# Patient Record
Sex: MALE | Race: WHITE | NOT HISPANIC OR LATINO | Employment: OTHER | URBAN - METROPOLITAN AREA
[De-identification: names, ages, dates, MRNs, and addresses within clinical notes are randomized per-mention and may not be internally consistent; named-entity substitution may affect disease eponyms.]

---

## 2017-01-12 ENCOUNTER — HOSPITAL ENCOUNTER (OUTPATIENT)
Dept: PAIN MEDICINE | Facility: HOSPITAL | Age: 67
Discharge: HOME | End: 2017-01-12
Attending: ANESTHESIOLOGY | Admitting: ANESTHESIOLOGY

## 2017-02-10 ENCOUNTER — HOSPITAL ENCOUNTER (OUTPATIENT)
Dept: OTHER | Facility: HOSPITAL | Age: 67
Setting detail: SPECIMEN
Discharge: HOME | End: 2017-02-10
Attending: INTERNAL MEDICINE | Admitting: INTERNAL MEDICINE

## 2017-02-10 ENCOUNTER — OFFICE (AMBULATORY)
Dept: URBAN - METROPOLITAN AREA CLINIC 64 | Facility: CLINIC | Age: 67
End: 2017-02-10
Payer: COMMERCIAL

## 2017-02-10 ENCOUNTER — ON CAMPUS - OUTPATIENT (AMBULATORY)
Dept: URBAN - METROPOLITAN AREA HOSPITAL 2 | Facility: HOSPITAL | Age: 67
End: 2017-02-10
Payer: COMMERCIAL

## 2017-02-10 VITALS
RESPIRATION RATE: 20 BRPM | HEART RATE: 73 BPM | HEART RATE: 94 BPM | TEMPERATURE: 97.3 F | SYSTOLIC BLOOD PRESSURE: 133 MMHG | RESPIRATION RATE: 18 BRPM | HEART RATE: 48 BPM | OXYGEN SATURATION: 100 % | HEART RATE: 68 BPM | WEIGHT: 173 LBS | DIASTOLIC BLOOD PRESSURE: 93 MMHG | RESPIRATION RATE: 16 BRPM | DIASTOLIC BLOOD PRESSURE: 86 MMHG | SYSTOLIC BLOOD PRESSURE: 169 MMHG | DIASTOLIC BLOOD PRESSURE: 73 MMHG | DIASTOLIC BLOOD PRESSURE: 67 MMHG | HEART RATE: 66 BPM | SYSTOLIC BLOOD PRESSURE: 157 MMHG | SYSTOLIC BLOOD PRESSURE: 161 MMHG | OXYGEN SATURATION: 99 % | SYSTOLIC BLOOD PRESSURE: 166 MMHG | SYSTOLIC BLOOD PRESSURE: 142 MMHG | HEIGHT: 68 IN | DIASTOLIC BLOOD PRESSURE: 97 MMHG | OXYGEN SATURATION: 91 %

## 2017-02-10 DIAGNOSIS — D12.2 BENIGN NEOPLASM OF ASCENDING COLON: ICD-10-CM

## 2017-02-10 DIAGNOSIS — K64.8 OTHER HEMORRHOIDS: ICD-10-CM

## 2017-02-10 DIAGNOSIS — K57.30 DIVERTICULOSIS OF LARGE INTESTINE WITHOUT PERFORATION OR ABS: ICD-10-CM

## 2017-02-10 DIAGNOSIS — Z86.010 PERSONAL HISTORY OF COLONIC POLYPS: ICD-10-CM

## 2017-02-10 DIAGNOSIS — K62.89 OTHER SPECIFIED DISEASES OF ANUS AND RECTUM: ICD-10-CM

## 2017-02-10 PROCEDURE — 45385 COLONOSCOPY W/LESION REMOVAL: CPT | Mod: PT | Performed by: INTERNAL MEDICINE

## 2017-02-10 PROCEDURE — 88305 TISSUE EXAM BY PATHOLOGIST: CPT | Mod: 26 | Performed by: INTERNAL MEDICINE

## 2017-03-09 ENCOUNTER — HOSPITAL ENCOUNTER (OUTPATIENT)
Dept: PAIN MEDICINE | Facility: HOSPITAL | Age: 67
Discharge: HOME | End: 2017-03-09
Attending: ANESTHESIOLOGY | Admitting: ANESTHESIOLOGY

## 2017-03-27 ENCOUNTER — HOSPITAL ENCOUNTER (OUTPATIENT)
Dept: OTHER | Facility: HOSPITAL | Age: 67
Discharge: HOME | End: 2017-03-27
Attending: FAMILY MEDICINE | Admitting: FAMILY MEDICINE

## 2017-04-10 ENCOUNTER — HOSPITAL ENCOUNTER (OUTPATIENT)
Dept: CARDIOLOGY | Facility: HOSPITAL | Age: 67
Discharge: HOME | End: 2017-04-10
Attending: INTERNAL MEDICINE | Admitting: INTERNAL MEDICINE

## 2017-05-11 ENCOUNTER — HOSPITAL ENCOUNTER (OUTPATIENT)
Dept: PAIN MEDICINE | Facility: HOSPITAL | Age: 67
Discharge: HOME | End: 2017-05-11
Attending: ANESTHESIOLOGY | Admitting: ANESTHESIOLOGY

## 2017-06-29 ENCOUNTER — HOSPITAL ENCOUNTER (OUTPATIENT)
Dept: PAIN MEDICINE | Facility: HOSPITAL | Age: 67
Discharge: HOME | End: 2017-06-29
Attending: ANESTHESIOLOGY | Admitting: ANESTHESIOLOGY

## 2017-08-02 ENCOUNTER — HOSPITAL ENCOUNTER (OUTPATIENT)
Dept: CARDIOLOGY | Facility: HOSPITAL | Age: 67
Discharge: HOME | End: 2017-08-02
Attending: SURGERY | Admitting: SURGERY

## 2017-08-21 ENCOUNTER — HOSPITAL ENCOUNTER (OUTPATIENT)
Dept: GENERAL RADIOLOGY | Facility: HOSPITAL | Age: 67
Discharge: HOME | End: 2017-08-21
Attending: PHYSICIAN ASSISTANT | Admitting: PHYSICIAN ASSISTANT

## 2017-08-24 ENCOUNTER — HOSPITAL ENCOUNTER (OUTPATIENT)
Dept: PAIN MEDICINE | Facility: HOSPITAL | Age: 67
Discharge: HOME | End: 2017-08-24
Attending: ANESTHESIOLOGY | Admitting: ANESTHESIOLOGY

## 2017-09-22 ENCOUNTER — HOSPITAL ENCOUNTER (OUTPATIENT)
Dept: SPEECH THERAPY | Facility: HOSPITAL | Age: 67
Discharge: HOME | End: 2017-09-22
Attending: PHYSICIAN ASSISTANT | Admitting: PHYSICIAN ASSISTANT

## 2017-10-13 ENCOUNTER — HOSPITAL ENCOUNTER (OUTPATIENT)
Dept: OTHER | Facility: HOSPITAL | Age: 67
Discharge: HOME | End: 2017-10-13
Attending: PHYSICIAN ASSISTANT | Admitting: PHYSICIAN ASSISTANT

## 2017-10-19 ENCOUNTER — HOSPITAL ENCOUNTER (OUTPATIENT)
Dept: PAIN MEDICINE | Facility: HOSPITAL | Age: 67
Discharge: HOME | End: 2017-10-19
Attending: ANESTHESIOLOGY | Admitting: ANESTHESIOLOGY

## 2017-11-30 ENCOUNTER — HOSPITAL ENCOUNTER (OUTPATIENT)
Dept: PAIN MEDICINE | Facility: HOSPITAL | Age: 67
Discharge: HOME | End: 2017-11-30
Attending: ANESTHESIOLOGY | Admitting: ANESTHESIOLOGY

## 2018-02-01 ENCOUNTER — HOSPITAL ENCOUNTER (OUTPATIENT)
Dept: PAIN MEDICINE | Facility: HOSPITAL | Age: 68
Discharge: HOME | End: 2018-02-01
Attending: ANESTHESIOLOGY | Admitting: ANESTHESIOLOGY

## 2018-03-15 ENCOUNTER — HOSPITAL ENCOUNTER (OUTPATIENT)
Dept: PAIN MEDICINE | Facility: HOSPITAL | Age: 68
Discharge: HOME | End: 2018-03-15
Attending: ANESTHESIOLOGY | Admitting: ANESTHESIOLOGY

## 2018-04-10 ENCOUNTER — HOSPITAL ENCOUNTER (OUTPATIENT)
Dept: OTHER | Facility: HOSPITAL | Age: 68
Setting detail: SPECIMEN
Discharge: HOME | End: 2018-04-10
Attending: UROLOGY | Admitting: UROLOGY

## 2018-04-10 LAB — SPECIMEN SOURCE: NORMAL

## 2018-04-26 ENCOUNTER — HOSPITAL ENCOUNTER (OUTPATIENT)
Dept: PAIN MEDICINE | Facility: HOSPITAL | Age: 68
Discharge: HOME | End: 2018-04-26
Attending: ANESTHESIOLOGY | Admitting: ANESTHESIOLOGY

## 2018-06-14 ENCOUNTER — HOSPITAL ENCOUNTER (OUTPATIENT)
Dept: PAIN MEDICINE | Facility: HOSPITAL | Age: 68
Discharge: HOME | End: 2018-06-14
Attending: ANESTHESIOLOGY | Admitting: ANESTHESIOLOGY

## 2018-07-19 ENCOUNTER — HOSPITAL ENCOUNTER (OUTPATIENT)
Dept: PAIN MEDICINE | Facility: HOSPITAL | Age: 68
Discharge: HOME | End: 2018-07-19
Attending: ANESTHESIOLOGY | Admitting: ANESTHESIOLOGY

## 2018-08-28 ENCOUNTER — HOSPITAL ENCOUNTER (OUTPATIENT)
Dept: CARDIOLOGY | Facility: HOSPITAL | Age: 68
Discharge: HOME | End: 2018-08-28
Attending: SURGERY | Admitting: SURGERY

## 2018-09-06 ENCOUNTER — HOSPITAL ENCOUNTER (OUTPATIENT)
Dept: PAIN MEDICINE | Facility: HOSPITAL | Age: 68
Discharge: HOME | End: 2018-09-06
Attending: ANESTHESIOLOGY | Admitting: ANESTHESIOLOGY

## 2018-10-25 ENCOUNTER — HOSPITAL ENCOUNTER (OUTPATIENT)
Dept: PAIN MEDICINE | Facility: HOSPITAL | Age: 68
Discharge: HOME | End: 2018-10-25
Attending: ANESTHESIOLOGY | Admitting: ANESTHESIOLOGY

## 2018-12-13 ENCOUNTER — HOSPITAL ENCOUNTER (OUTPATIENT)
Dept: PAIN MEDICINE | Facility: HOSPITAL | Age: 68
Discharge: HOME | End: 2018-12-13
Attending: ANESTHESIOLOGY | Admitting: ANESTHESIOLOGY

## 2019-01-31 ENCOUNTER — HOSPITAL ENCOUNTER (OUTPATIENT)
Dept: PAIN MEDICINE | Facility: HOSPITAL | Age: 69
Discharge: HOME | End: 2019-01-31
Attending: ANESTHESIOLOGY | Admitting: ANESTHESIOLOGY

## 2019-03-21 ENCOUNTER — HOSPITAL ENCOUNTER (OUTPATIENT)
Dept: PAIN MEDICINE | Facility: HOSPITAL | Age: 69
Discharge: HOME | End: 2019-03-21
Attending: ANESTHESIOLOGY | Admitting: ANESTHESIOLOGY

## 2019-04-23 ENCOUNTER — CONVERSION ENCOUNTER (OUTPATIENT)
Dept: PAIN MEDICINE | Facility: CLINIC | Age: 69
End: 2019-04-23

## 2019-04-23 ENCOUNTER — HOSPITAL ENCOUNTER (OUTPATIENT)
Dept: PAIN MEDICINE | Facility: HOSPITAL | Age: 69
Discharge: HOME | End: 2019-04-23
Attending: ANESTHESIOLOGY | Admitting: ANESTHESIOLOGY

## 2019-05-24 ENCOUNTER — HOSPITAL ENCOUNTER (OUTPATIENT)
Dept: PAIN MEDICINE | Facility: HOSPITAL | Age: 69
Discharge: HOME | End: 2019-05-24
Attending: ANESTHESIOLOGY | Admitting: ANESTHESIOLOGY

## 2019-05-24 ENCOUNTER — CONVERSION ENCOUNTER (OUTPATIENT)
Dept: PAIN MEDICINE | Facility: CLINIC | Age: 69
End: 2019-05-24

## 2019-05-24 LAB
AMPHETAMINES UR QL SCN: NEGATIVE
BARBITURATES UR QL SCN: NEGATIVE
BENZODIAZ UR QL SCN: NEGATIVE
BZE UR QL SCN: NEGATIVE
CREAT 24H UR-MCNC: 80.6 MG/DL
METHADONE UR QL SCN: NEGATIVE
OPIATE CONFIRMATION URINE: ABNORMAL
OPIATES TESTED UR SCN: ABNORMAL
PCP UR QL: NEGATIVE
THC SERPLBLD CFM-MCNC: NEGATIVE NG/ML

## 2019-06-03 VITALS
DIASTOLIC BLOOD PRESSURE: 77 MMHG | SYSTOLIC BLOOD PRESSURE: 132 MMHG | HEIGHT: 68 IN | RESPIRATION RATE: 16 BRPM | OXYGEN SATURATION: 98 % | BODY MASS INDEX: 26.52 KG/M2 | HEART RATE: 66 BPM | WEIGHT: 175 LBS

## 2019-06-04 VITALS
WEIGHT: 178 LBS | RESPIRATION RATE: 16 BRPM | OXYGEN SATURATION: 98 % | BODY MASS INDEX: 26.98 KG/M2 | SYSTOLIC BLOOD PRESSURE: 171 MMHG | DIASTOLIC BLOOD PRESSURE: 99 MMHG | HEART RATE: 74 BPM | HEIGHT: 68 IN
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
## 2019-06-12 ENCOUNTER — HOSPITAL ENCOUNTER (OUTPATIENT)
Dept: LAB | Facility: HOSPITAL | Age: 69
Discharge: HOME | End: 2019-06-12
Attending: ORTHOPAEDIC SURGERY | Admitting: ORTHOPAEDIC SURGERY

## 2019-06-12 LAB
CRP SERPL-MCNC: 0.09 MG/DL (ref 0–0.7)
ERYTHROCYTE [SEDIMENTATION RATE] IN BLOOD BY WESTERGREN METHOD: 14 MM/HR (ref 0–20)

## 2019-06-21 RX ORDER — BACLOFEN 10 MG/1
TABLET ORAL
Qty: 90 TABLET | Refills: 1 | Status: SHIPPED | OUTPATIENT
Start: 2019-06-21 | End: 2019-07-17 | Stop reason: SDUPTHER

## 2019-06-24 RX ORDER — LUBIPROSTONE 24 UG/1
CAPSULE, GELATIN COATED ORAL
Qty: 60 CAPSULE | Refills: 1 | OUTPATIENT
Start: 2019-06-24

## 2019-06-24 RX ORDER — GABAPENTIN ENACARBIL 600 MG/1
TABLET, EXTENDED RELEASE ORAL
Qty: 90 TABLET | Refills: 1 | OUTPATIENT
Start: 2019-06-24

## 2019-06-25 RX ORDER — LUBIPROSTONE 24 UG/1
24 CAPSULE, GELATIN COATED ORAL 2 TIMES DAILY
Refills: 1 | COMMUNITY
Start: 2019-05-21 | End: 2019-06-25 | Stop reason: SDUPTHER

## 2019-06-25 RX ORDER — LUBIPROSTONE 24 UG/1
24 CAPSULE, GELATIN COATED ORAL 2 TIMES DAILY
Qty: 60 CAPSULE | Refills: 1 | Status: SHIPPED | OUTPATIENT
Start: 2019-06-25 | End: 2019-08-26 | Stop reason: SDUPTHER

## 2019-06-26 ENCOUNTER — TELEPHONE (OUTPATIENT)
Dept: PAIN MEDICINE | Facility: HOSPITAL | Age: 69
End: 2019-06-26

## 2019-06-29 RX ORDER — GLIPIZIDE 5 MG/1
TABLET ORAL
Qty: 30 TABLET | Refills: 1 | Status: SHIPPED | OUTPATIENT
Start: 2019-06-29 | End: 2019-08-26 | Stop reason: SDUPTHER

## 2019-07-17 ENCOUNTER — APPOINTMENT (OUTPATIENT)
Dept: PAIN MEDICINE | Facility: CLINIC | Age: 69
End: 2019-07-17

## 2019-07-17 ENCOUNTER — OFFICE VISIT (OUTPATIENT)
Dept: PAIN MEDICINE | Facility: CLINIC | Age: 69
End: 2019-07-17

## 2019-07-17 VITALS
RESPIRATION RATE: 16 BRPM | DIASTOLIC BLOOD PRESSURE: 79 MMHG | SYSTOLIC BLOOD PRESSURE: 160 MMHG | HEIGHT: 68 IN | HEART RATE: 55 BPM | TEMPERATURE: 98 F | BODY MASS INDEX: 27.28 KG/M2 | WEIGHT: 180 LBS

## 2019-07-17 DIAGNOSIS — M25.50 POLYARTHRALGIA: ICD-10-CM

## 2019-07-17 DIAGNOSIS — G89.4 CHRONIC PAIN SYNDROME: Primary | ICD-10-CM

## 2019-07-17 DIAGNOSIS — M79.18 MYOFASCIAL PAIN SYNDROME: ICD-10-CM

## 2019-07-17 DIAGNOSIS — M47.812 CERVICAL SPONDYLOSIS WITHOUT MYELOPATHY: ICD-10-CM

## 2019-07-17 DIAGNOSIS — Z79.899 OTHER LONG TERM (CURRENT) DRUG THERAPY: ICD-10-CM

## 2019-07-17 DIAGNOSIS — M79.2 NEUROPATHIC PAIN: ICD-10-CM

## 2019-07-17 PROCEDURE — G0463 HOSPITAL OUTPT CLINIC VISIT: HCPCS | Performed by: ANESTHESIOLOGY

## 2019-07-17 PROCEDURE — 99214 OFFICE O/P EST MOD 30 MIN: CPT | Performed by: ANESTHESIOLOGY

## 2019-07-17 RX ORDER — AMLODIPINE BESYLATE 5 MG/1
TABLET ORAL EVERY 12 HOURS
COMMUNITY
Start: 2018-01-19 | End: 2020-01-10

## 2019-07-17 RX ORDER — MORPHINE SULFATE AND NALTREXONE HYDROCHLORIDE 20; .8 MG/1; MG/1
1 CAPSULE, EXTENDED RELEASE ORAL 2 TIMES DAILY
Qty: 60 CAPSULE | Refills: 0 | Status: SHIPPED | OUTPATIENT
Start: 2019-07-17 | End: 2019-07-17 | Stop reason: SDUPTHER

## 2019-07-17 RX ORDER — MORPHINE SULFATE AND NALTREXONE HYDROCHLORIDE 20; .8 MG/1; MG/1
1 CAPSULE, EXTENDED RELEASE ORAL 2 TIMES DAILY
Qty: 60 CAPSULE | Refills: 0 | Status: SHIPPED | OUTPATIENT
Start: 2019-07-17 | End: 2019-09-20 | Stop reason: SDUPTHER

## 2019-07-17 RX ORDER — MORPHINE SULFATE AND NALTREXONE HYDROCHLORIDE 20; .8 MG/1; MG/1
1 CAPSULE, EXTENDED RELEASE ORAL 2 TIMES DAILY
Refills: 0 | COMMUNITY
Start: 2019-06-24 | End: 2019-07-17 | Stop reason: SDUPTHER

## 2019-07-17 RX ORDER — ATENOLOL 50 MG/1
TABLET ORAL EVERY 12 HOURS
COMMUNITY
Start: 2017-11-27 | End: 2019-08-01 | Stop reason: SDUPTHER

## 2019-07-17 RX ORDER — ATORVASTATIN CALCIUM 40 MG/1
TABLET, FILM COATED ORAL
COMMUNITY
Start: 2018-05-24 | End: 2019-08-01 | Stop reason: SDUPTHER

## 2019-07-17 RX ORDER — BACLOFEN 10 MG/1
10 TABLET ORAL 3 TIMES DAILY
Qty: 90 TABLET | Refills: 5 | Status: SHIPPED | OUTPATIENT
Start: 2019-07-17 | End: 2020-02-11

## 2019-07-17 RX ORDER — PENTOXIFYLLINE 400 MG/1
TABLET, EXTENDED RELEASE ORAL EVERY 8 HOURS
COMMUNITY
Start: 2019-02-27 | End: 2019-08-01 | Stop reason: SDUPTHER

## 2019-07-17 RX ORDER — CYANOCOBALAMIN 1000 UG/ML
INJECTION, SOLUTION INTRAMUSCULAR; SUBCUTANEOUS
COMMUNITY
Start: 2018-05-24 | End: 2019-08-01 | Stop reason: SDUPTHER
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
## 2019-08-01 RX ORDER — ATORVASTATIN CALCIUM 40 MG/1
TABLET, FILM COATED ORAL
Qty: 30 TABLET | Refills: 6 | Status: SHIPPED | OUTPATIENT
Start: 2019-08-01 | End: 2020-02-08

## 2019-08-01 RX ORDER — CYANOCOBALAMIN 1000 UG/ML
INJECTION, SOLUTION INTRAMUSCULAR; SUBCUTANEOUS
Qty: 1 ML | Refills: 1 | Status: SHIPPED | OUTPATIENT
Start: 2019-08-01 | End: 2019-09-26 | Stop reason: SDUPTHER

## 2019-08-01 RX ORDER — PENTOXIFYLLINE 400 MG/1
TABLET, EXTENDED RELEASE ORAL
Qty: 90 TABLET | Refills: 4 | Status: SHIPPED | OUTPATIENT
Start: 2019-08-01 | End: 2019-12-11 | Stop reason: SDUPTHER

## 2019-08-01 RX ORDER — ATENOLOL 50 MG/1
TABLET ORAL
Qty: 30 TABLET | Refills: 1 | Status: SHIPPED | OUTPATIENT
Start: 2019-08-01 | End: 2019-09-26 | Stop reason: SDUPTHER

## 2019-08-21 PROBLEM — M25.571 ARTHRALGIA OF RIGHT ANKLE: Status: ACTIVE | Noted: 2019-08-21

## 2019-08-21 PROBLEM — I10 HYPERTENSION: Status: ACTIVE | Noted: 2019-08-21

## 2019-08-21 PROBLEM — G89.29 CHRONIC PAIN: Status: ACTIVE | Noted: 2019-04-23

## 2019-08-21 PROBLEM — G45.3 AMAUROSIS FUGAX: Status: ACTIVE | Noted: 2017-03-27

## 2019-08-21 PROBLEM — M47.812 OSTEOARTHRITIS OF CERVICAL SPINE WITHOUT MYELOPATHY: Status: ACTIVE | Noted: 2019-04-23

## 2019-08-21 PROBLEM — E11.65 TYPE 2 DIABETES MELLITUS WITH HYPERGLYCEMIA: Status: ACTIVE | Noted: 2018-03-29

## 2019-08-21 PROBLEM — M54.12 CERVICAL RADICULITIS: Status: ACTIVE | Noted: 2019-04-23

## 2019-08-21 PROBLEM — N13.9 OBSTRUCTIVE UROPATHY: Status: ACTIVE | Noted: 2018-04-05

## 2019-08-21 PROBLEM — E78.5 HYPERLIPIDEMIA: Status: ACTIVE | Noted: 2019-08-21

## 2019-08-21 PROBLEM — M25.519 SHOULDER PAIN: Status: ACTIVE | Noted: 2019-08-21

## 2019-08-21 PROBLEM — Z83.3 FAMILY HISTORY OF DIABETES MELLITUS: Status: ACTIVE | Noted: 2019-08-21

## 2019-08-21 PROBLEM — I69.328 SPEECH OR LANGUAGE DEFICIT FOLLOWING CEREBROVASCULAR ACCIDENT: Status: ACTIVE | Noted: 2017-08-14

## 2019-08-21 PROBLEM — Z87.828 HISTORY OF HEAD INJURY: Status: ACTIVE | Noted: 2017-01-26

## 2019-08-21 PROBLEM — G47.9 SLEEP DISORDER: Status: ACTIVE | Noted: 2017-04-06

## 2019-08-21 PROBLEM — I25.10 CHRONIC CORONARY ARTERY DISEASE: Status: ACTIVE | Noted: 2017-03-27

## 2019-08-21 PROBLEM — I65.23 BILATERAL CAROTID ARTERY STENOSIS: Status: ACTIVE | Noted: 2017-03-27

## 2019-08-21 PROBLEM — H53.139 SUDDEN VISUAL LOSS: Status: ACTIVE | Noted: 2017-03-24

## 2019-08-21 PROBLEM — D12.6 ADENOMATOUS POLYP OF COLON: Status: ACTIVE | Noted: 2019-02-23

## 2019-08-21 PROBLEM — B36.9 FUNGAL DERMATITIS: Status: ACTIVE | Noted: 2018-02-06

## 2019-08-21 PROBLEM — M54.2 NECK PAIN: Status: ACTIVE | Noted: 2019-08-21

## 2019-08-21 PROBLEM — Z82.3 FAMILY HISTORY OF STROKE: Status: ACTIVE | Noted: 2019-08-21

## 2019-08-23 RX ORDER — LUBIPROSTONE 24 UG/1
CAPSULE, GELATIN COATED ORAL
Qty: 60 CAPSULE | Refills: 1 | OUTPATIENT
Start: 2019-08-23

## 2019-08-25 RX ORDER — GLIPIZIDE 5 MG/1
TABLET ORAL
Qty: 30 TABLET | Refills: 1 | OUTPATIENT
Start: 2019-08-25

## 2019-08-26 ENCOUNTER — OFFICE VISIT (OUTPATIENT)
Dept: FAMILY MEDICINE CLINIC | Facility: CLINIC | Age: 69
End: 2019-08-26

## 2019-08-26 VITALS
RESPIRATION RATE: 18 BRPM | OXYGEN SATURATION: 97 % | SYSTOLIC BLOOD PRESSURE: 138 MMHG | HEIGHT: 68 IN | HEART RATE: 65 BPM | BODY MASS INDEX: 29.13 KG/M2 | DIASTOLIC BLOOD PRESSURE: 75 MMHG | WEIGHT: 192.2 LBS | TEMPERATURE: 98.8 F

## 2019-08-26 DIAGNOSIS — E78.01 FAMILIAL HYPERCHOLESTEROLEMIA: ICD-10-CM

## 2019-08-26 DIAGNOSIS — E11.65 TYPE 2 DIABETES MELLITUS WITH HYPERGLYCEMIA, UNSPECIFIED WHETHER LONG TERM INSULIN USE (HCC): Primary | ICD-10-CM

## 2019-08-26 DIAGNOSIS — I10 ESSENTIAL HYPERTENSION: ICD-10-CM

## 2019-08-26 DIAGNOSIS — I25.10 CHRONIC CORONARY ARTERY DISEASE: ICD-10-CM

## 2019-08-26 LAB
BILIRUB BLD-MCNC: NEGATIVE MG/DL
CLARITY, POC: CLEAR
COLOR UR: YELLOW
GLUCOSE UR STRIP-MCNC: NEGATIVE MG/DL
HBA1C MFR BLD: 6.4 %
KETONES UR QL: NEGATIVE
LEUKOCYTE EST, POC: NEGATIVE
NITRITE UR-MCNC: NEGATIVE MG/ML
PH UR: 5.5 [PH] (ref 5–8)
PROT UR STRIP-MCNC: ABNORMAL MG/DL
RBC # UR STRIP: ABNORMAL /UL
SP GR UR: 1.02 (ref 1–1.03)
UROBILINOGEN UR QL: NORMAL

## 2019-08-26 PROCEDURE — 81003 URINALYSIS AUTO W/O SCOPE: CPT | Performed by: FAMILY MEDICINE

## 2019-08-26 PROCEDURE — 99214 OFFICE O/P EST MOD 30 MIN: CPT | Performed by: FAMILY MEDICINE

## 2019-08-26 PROCEDURE — 83036 HEMOGLOBIN GLYCOSYLATED A1C: CPT | Performed by: FAMILY MEDICINE

## 2019-08-26 RX ORDER — GLIPIZIDE 5 MG/1
TABLET ORAL
Qty: 30 TABLET | Refills: 1 | Status: SHIPPED | OUTPATIENT
Start: 2019-08-26 | End: 2019-10-08 | Stop reason: SDUPTHER

## 2019-08-26 RX ORDER — ASPIRIN 81 MG/1
81 TABLET ORAL DAILY
COMMUNITY

## 2019-08-26 RX ORDER — LUBIPROSTONE 24 UG/1
24 CAPSULE, GELATIN COATED ORAL 2 TIMES DAILY
Qty: 60 CAPSULE | Refills: 1 | Status: SHIPPED | OUTPATIENT
Start: 2019-08-26 | End: 2019-09-20 | Stop reason: SDUPTHER

## 2019-08-26 NOTE — PROGRESS NOTES
Patient presents for follow-up on stable chronic medical problems including diabetes, hypertension, hyperlipidemia and coronary artery disease. Patient denies adverse effects of medications, chest pain, shortness of breath and abdominal pain. Patient complains of dizziness and fatigue.         Past Medical History:   Diagnosis Date   • Adenomatous polyps    • Amaurosis fugax, right eye    • Arm pain    • BPH (benign prostatic hyperplasia)    • CAD (coronary artery disease)    • CKD (chronic kidney disease) stage 3, GFR 30-59 ml/min (CMS/[facility])    • CVA, old, speech/language deficit    • Diabetes (CMS/[facility])    • Diabetic acetonemia (CMS/[facility])    • Fractures    • Hearing loss    • Hyperlipidemia    • Hypertension    • Joint pain    • Kidney stones    • Low back pain    • Neuropathic pain    • Obstructive uropathy    • Shoulder pain    • Sleep disorder    • Spondylosis, cervical      Past Surgical History:   Procedure Laterality Date   • ANKLE SURGERY      replacement  rt   • CAROTID ARTERY ANGIOPLASTY     • CARPAL TUNNEL RELEASE     • COLONOSCOPY     • CYSTOSCOPY URETEROSCOPY LASER LITHOTRIPSY      kidney stones   • EYE SURGERY      mesh  and plate under rt eye due to orbital fx   • NOSE SURGERY      x2   • TEETH EXTRACTION      dentures     Family History   Problem Relation Age of Onset   • Dementia Mother    • Heart disease Mother    • COPD Father    • Stroke Father    • Heart disease Father    • Hypertension Sister    • Diabetes Sister    • Hypertension Brother      Social History     Tobacco Use   • Smoking status: Never Smoker   • Smokeless tobacco: Never Used   Substance Use Topics   • Alcohol use: No     Frequency: Never     PHQ-2 Depression Screening  Little interest or pleasure in doing things?     Feeling down, depressed, or hopeless?     PHQ-2 Total Score       PHQ-9 Depression Screening  Little interest or pleasure in doing things?     Feeling down, depressed, or hopeless?     Trouble falling or staying  "asleep, or sleeping too much?     Feeling tired or having little energy?     Poor appetite or overeating?     Feeling bad about yourself - or that you are a failure or have let yourself or your family down?     Trouble concentrating on things, such as reading the newspaper or watching television?     Moving or speaking so slowly that other people could have noticed? Or the opposite - being so fidgety or restless that you have been moving around a lot more than usual?     Thoughts that you would be better off dead, or of hurting yourself in some way?     PHQ-9 Total Score     If you checked off any problems, how difficult have these problems made it for you to do your work, take care of things at home, or get along with other people?         Review of Systems   Constitutional: Negative for chills and fatigue.   Respiratory: Negative for cough and shortness of breath.    Cardiovascular: Negative for chest pain and palpitations.   Musculoskeletal: Positive for arthralgias, back pain and myalgias.   Skin: Negative for rash.   Neurological: Negative for dizziness and headache.     Vitals:    08/26/19 1120 08/26/19 1140   BP: 165/84 138/75   BP Location: Left arm    Cuff Size: Adult    Pulse: 65    Resp: 18    Temp: 98.8 °F (37.1 °C)    TempSrc: Oral    SpO2: 97%    Weight: 87.2 kg (192 lb 3.2 oz)    Height: 172.7 cm (68\")      Body mass index is 29.22 kg/m².  Physical Exam   Constitutional: He is oriented to person, place, and time. He appears well-developed and well-nourished. No distress.   Cardiovascular: Normal rate, regular rhythm and normal heart sounds.   No murmur heard.  Pulmonary/Chest: Effort normal and breath sounds normal. He has no wheezes. He has no rales.   Abdominal: Soft. Bowel sounds are normal. He exhibits no distension.   Musculoskeletal: Normal range of motion.      During the foot exam he had a monofilament test performed.    Neurological Sensory Findings - Unaltered hot/cold right ankle/foot " discrimination and unaltered hot/cold left ankle/foot discrimination. Unaltered sharp/dull right ankle/foot discrimination and unaltered sharp/dull left ankle/foot discrimination. No right ankle/foot altered proprioception and no left ankle/foot altered proprioception  Vascular Status -  His right foot exhibits normal foot vasculature  and no edema. His left foot exhibits normal foot vasculature  and no edema.  Skin Integrity  -  His right foot skin is intact.His left foot skin is intact..   Foot Structure and Biomechanics -  His right foot has no hallux valgus, no pes cavus, no claw toes, no hammer toes and no cavovarus present. His left foot has no hallux valgus, no pes cavus, no claw toes, no hammer toes and no cavovarus.  Neurological: He is alert and oriented to person, place, and time.   Skin: Skin is warm and dry.   Psychiatric: He has a normal mood and affect. His behavior is normal.     Office Visit on 08/26/2019   Component Date Value Ref Range Status   • Hemoglobin A1C 08/26/2019 6.4  % Final   • Color 08/26/2019 Yellow  Yellow, Straw, Dark Yellow, [place] Final   • Clarity, UA 08/26/2019 Clear  Clear Final   • Specific Gravity  08/26/2019 1.025  1.005 - 1.030 Final   • pH, Urine 08/26/2019 5.5  5.0 - 8.0 Final   • Leukocytes 08/26/2019 Negative  Negative Final   • Nitrite, UA 08/26/2019 Negative  Negative Final   • Protein, POC 08/26/2019 30 mg/dL* Negative mg/dL Final   • Glucose, UA 08/26/2019 Negative  Negative, 1000 mg/dL (3+) mg/dL Final   • Ketones, UA 08/26/2019 Negative  Negative Final   • Urobilinogen, UA 08/26/2019 Normal  Normal Final   • Bilirubin 08/26/2019 Negative  Negative Final   • Blood, UA 08/26/2019 Trace* Negative Final         Diagnoses and all orders for this visit:    1. Type 2 diabetes mellitus with hyperglycemia, unspecified whether long term insulin use (CMS/[facility]) (Primary)  Assessment & Plan:  Doing well. Symptoms associated with diabetes were reviewed and patient has had none.  Review of complications of diabetes reveals peripheral neuropathy. Encouraged routine blood sugar monitoring. Discussed need for aspirin, statin, and ACE-inhibitor/ARB therapy. Recommended daily foot exams, quaterly Hemoglobin A1c evaluations, yearly flu vaccine, yearly dilated fundoscopic exams, and dietary modifications.     Orders:  -     POC Glycosylated Hemoglobin (Hb A1C)  -     Microalbumin / Creatinine Urine Ratio - Urine, Clean Catch; Future  -     POC Urinalysis Dipstick, Automated  -     Microalbumin / Creatinine Urine Ratio - Urine, Clean Catch    2. Chronic coronary artery disease  Assessment & Plan:  No angina or decreased exercise tolerance. Tolerating medications without adverse effects. Continue medications and lifestyle modificaitons.        3. Essential hypertension  Assessment & Plan:  Report any headache, dizziness, chest pain, syncope, or other concerns.       4. Familial hypercholesterolemia  Assessment & Plan:  Encouraged low-fat, low-cholesterol diet. Discussed timing of lipid monitoring, need for liver monitoring while on meds. Risks of lack of control discussed.

## 2019-08-27 LAB
ALBUMIN/CREAT UR: 72.4 MG/G CREAT (ref 0–30)
CREAT UR-MCNC: 95.5 MG/DL
MICROALBUMIN UR-MCNC: 69.1 UG/ML

## 2019-09-20 ENCOUNTER — OFFICE VISIT (OUTPATIENT)
Dept: PAIN MEDICINE | Facility: CLINIC | Age: 69
End: 2019-09-20

## 2019-09-20 VITALS
HEIGHT: 67 IN | SYSTOLIC BLOOD PRESSURE: 151 MMHG | BODY MASS INDEX: 27.47 KG/M2 | RESPIRATION RATE: 16 BRPM | TEMPERATURE: 98.1 F | OXYGEN SATURATION: 98 % | DIASTOLIC BLOOD PRESSURE: 84 MMHG | HEART RATE: 56 BPM | WEIGHT: 175 LBS

## 2019-09-20 DIAGNOSIS — Z79.899 HIGH RISK MEDICATION USE: ICD-10-CM

## 2019-09-20 DIAGNOSIS — G89.4 CHRONIC PAIN SYNDROME: Primary | ICD-10-CM

## 2019-09-20 DIAGNOSIS — M79.2 NEUROPATHIC PAIN: ICD-10-CM

## 2019-09-20 DIAGNOSIS — M47.812 CERVICAL SPONDYLOSIS WITHOUT MYELOPATHY: ICD-10-CM

## 2019-09-20 DIAGNOSIS — M79.18 MYOFASCIAL PAIN SYNDROME: ICD-10-CM

## 2019-09-20 DIAGNOSIS — F19.90 CURRENT DRUG USE: Primary | ICD-10-CM

## 2019-09-20 DIAGNOSIS — M25.50 POLYARTHRALGIA: ICD-10-CM

## 2019-09-20 PROCEDURE — G0463 HOSPITAL OUTPT CLINIC VISIT: HCPCS | Performed by: ANESTHESIOLOGY

## 2019-09-20 PROCEDURE — 99214 OFFICE O/P EST MOD 30 MIN: CPT | Performed by: ANESTHESIOLOGY

## 2019-09-20 RX ORDER — MORPHINE SULFATE AND NALTREXONE HYDROCHLORIDE 20; .8 MG/1; MG/1
1 CAPSULE, EXTENDED RELEASE ORAL 2 TIMES DAILY
Qty: 60 CAPSULE | Refills: 0 | Status: SHIPPED | OUTPATIENT
Start: 2019-09-20 | End: 2019-09-20 | Stop reason: SDUPTHER

## 2019-09-20 RX ORDER — MORPHINE SULFATE AND NALTREXONE HYDROCHLORIDE 20; .8 MG/1; MG/1
1 CAPSULE, EXTENDED RELEASE ORAL 2 TIMES DAILY
Qty: 60 CAPSULE | Refills: 0 | Status: SHIPPED | OUTPATIENT
Start: 2019-09-20 | End: 2019-11-15

## 2019-09-20 RX ORDER — LUBIPROSTONE 24 UG/1
24 CAPSULE, GELATIN COATED ORAL 2 TIMES DAILY
Qty: 60 CAPSULE | Refills: 11 | Status: SHIPPED | OUTPATIENT
Start: 2019-09-20 | End: 2020-09-22 | Stop reason: SDUPTHER
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
## 2019-09-26 RX ORDER — CYANOCOBALAMIN 1000 UG/ML
INJECTION, SOLUTION INTRAMUSCULAR; SUBCUTANEOUS
Qty: 1 ML | Refills: 1 | Status: SHIPPED | OUTPATIENT
Start: 2019-09-26 | End: 2019-11-02 | Stop reason: SDUPTHER

## 2019-09-26 RX ORDER — ATENOLOL 50 MG/1
TABLET ORAL
Qty: 30 TABLET | Refills: 1 | Status: SHIPPED | OUTPATIENT
Start: 2019-09-26 | End: 2019-11-02 | Stop reason: SDUPTHER

## 2019-09-27 ENCOUNTER — APPOINTMENT (OUTPATIENT)
Dept: CT IMAGING | Facility: HOSPITAL | Age: 69
End: 2019-09-27

## 2019-09-27 ENCOUNTER — HOSPITAL ENCOUNTER (EMERGENCY)
Facility: HOSPITAL | Age: 69
Discharge: HOME | End: 2019-09-27
Admitting: EMERGENCY MEDICINE

## 2019-09-27 VITALS
SYSTOLIC BLOOD PRESSURE: 128 MMHG | BODY MASS INDEX: 27.6 KG/M2 | HEART RATE: 63 BPM | DIASTOLIC BLOOD PRESSURE: 73 MMHG | OXYGEN SATURATION: 98 % | HEIGHT: 68 IN | TEMPERATURE: 98.4 F | WEIGHT: 182.1 LBS | RESPIRATION RATE: 17 BRPM

## 2019-09-27 DIAGNOSIS — R10.9 FLANK PAIN: Primary | ICD-10-CM

## 2019-09-27 DIAGNOSIS — R31.9 HEMATURIA, UNSPECIFIED TYPE: ICD-10-CM

## 2019-09-27 DIAGNOSIS — R11.0 NAUSEA: ICD-10-CM

## 2019-09-27 LAB
ALBUMIN SERPL-MCNC: 3.6 G/DL (ref 3.5–4.8)
ALBUMIN/GLOB SERPL: 0.9 G/DL (ref 1–1.7)
ALP SERPL-CCNC: 94 U/L (ref 32–91)
ALT SERPL W P-5'-P-CCNC: 20 U/L (ref 17–63)
ANION GAP SERPL CALCULATED.3IONS-SCNC: 15.5 MMOL/L (ref 5–15)
AST SERPL-CCNC: 22 U/L (ref 15–41)
BACTERIA UR QL AUTO: ABNORMAL /HPF
BASOPHILS # BLD AUTO: 0 10*3/MM3 (ref 0–0.2)
BASOPHILS NFR BLD AUTO: 0.2 % (ref 0–1.5)
BILIRUB SERPL-MCNC: 1 MG/DL (ref 0.3–1.2)
BILIRUB UR QL STRIP: NEGATIVE
BUN BLD-MCNC: 22 MG/DL (ref 8–20)
BUN/CREAT SERPL: 22 (ref 6.2–20.3)
CALCIUM SPEC-SCNC: 8.7 MG/DL (ref 8.9–10.3)
CHLORIDE SERPL-SCNC: 101 MMOL/L (ref 101–111)
CLARITY UR: CLEAR
CO2 SERPL-SCNC: 28 MMOL/L (ref 22–32)
COD CRY URNS QL: ABNORMAL /HPF
COLOR UR: YELLOW
CREAT BLD-MCNC: 1 MG/DL (ref 0.7–1.2)
DEPRECATED RDW RBC AUTO: 40.3 FL (ref 37–54)
EOSINOPHIL # BLD AUTO: 0.1 10*3/MM3 (ref 0–0.4)
EOSINOPHIL NFR BLD AUTO: 1.8 % (ref 0.3–6.2)
ERYTHROCYTE [DISTWIDTH] IN BLOOD BY AUTOMATED COUNT: 13.5 % (ref 12.3–15.4)
GFR SERPL CREATININE-BSD FRML MDRD: 74 ML/MIN/1.73
GLOBULIN UR ELPH-MCNC: 3.9 GM/DL (ref 2.5–3.8)
GLUCOSE BLD-MCNC: 164 MG/DL (ref 65–99)
GLUCOSE UR STRIP-MCNC: NEGATIVE MG/DL
HCT VFR BLD AUTO: 42.2 % (ref 37.5–51)
HGB BLD-MCNC: 14.4 G/DL (ref 13–17.7)
HGB UR QL STRIP.AUTO: ABNORMAL
HYALINE CASTS UR QL AUTO: ABNORMAL /LPF
KETONES UR QL STRIP: NEGATIVE
LEUKOCYTE ESTERASE UR QL STRIP.AUTO: NEGATIVE
LIPASE SERPL-CCNC: 24 U/L (ref 22–51)
LYMPHOCYTES # BLD AUTO: 1.3 10*3/MM3 (ref 0.7–3.1)
LYMPHOCYTES NFR BLD AUTO: 24.5 % (ref 19.6–45.3)
MCH RBC QN AUTO: 29.1 PG (ref 26.6–33)
MCHC RBC AUTO-ENTMCNC: 34.2 G/DL (ref 31.5–35.7)
MCV RBC AUTO: 85.1 FL (ref 79–97)
MONOCYTES # BLD AUTO: 0.7 10*3/MM3 (ref 0.1–0.9)
MONOCYTES NFR BLD AUTO: 13.7 % (ref 5–12)
MUCOUS THREADS URNS QL MICRO: ABNORMAL /HPF
NEUTROPHILS # BLD AUTO: 3.2 10*3/MM3 (ref 1.7–7)
NEUTROPHILS NFR BLD AUTO: 59.8 % (ref 42.7–76)
NITRITE UR QL STRIP: NEGATIVE
NRBC BLD AUTO-RTO: 0.2 /100 WBC (ref 0–0.2)
PH UR STRIP.AUTO: 6 [PH] (ref 5–8)
PLATELET # BLD AUTO: 187 10*3/MM3 (ref 140–450)
PMV BLD AUTO: 8.6 FL (ref 6–12)
POTASSIUM BLD-SCNC: 3.5 MMOL/L (ref 3.6–5.1)
PROT SERPL-MCNC: 7.5 G/DL (ref 6.1–7.9)
PROT UR QL STRIP: ABNORMAL
RBC # BLD AUTO: 4.96 10*6/MM3 (ref 4.14–5.8)
RBC # UR: ABNORMAL /HPF
REF LAB TEST METHOD: ABNORMAL
SODIUM BLD-SCNC: 141 MMOL/L (ref 136–144)
SP GR UR STRIP: 1.01 (ref 1–1.03)
SQUAMOUS #/AREA URNS HPF: ABNORMAL /HPF
UROBILINOGEN UR QL STRIP: ABNORMAL
WBC NRBC COR # BLD: 5.4 10*3/MM3 (ref 3.4–10.8)
WBC UR QL AUTO: ABNORMAL /HPF

## 2019-09-27 PROCEDURE — 99283 EMERGENCY DEPT VISIT LOW MDM: CPT

## 2019-09-27 PROCEDURE — 81001 URINALYSIS AUTO W/SCOPE: CPT | Performed by: PHYSICIAN ASSISTANT

## 2019-09-27 PROCEDURE — 83690 ASSAY OF LIPASE: CPT | Performed by: PHYSICIAN ASSISTANT

## 2019-09-27 PROCEDURE — 80053 COMPREHEN METABOLIC PANEL: CPT | Performed by: PHYSICIAN ASSISTANT

## 2019-09-27 PROCEDURE — 96375 TX/PRO/DX INJ NEW DRUG ADDON: CPT

## 2019-09-27 PROCEDURE — 25010000002 MORPHINE PER 10 MG: Performed by: EMERGENCY MEDICINE

## 2019-09-27 PROCEDURE — 74176 CT ABD & PELVIS W/O CONTRAST: CPT

## 2019-09-27 PROCEDURE — 85025 COMPLETE CBC W/AUTO DIFF WBC: CPT | Performed by: PHYSICIAN ASSISTANT

## 2019-09-27 PROCEDURE — 96374 THER/PROPH/DIAG INJ IV PUSH: CPT

## 2019-09-27 PROCEDURE — 25010000002 PROMETHAZINE PER 50 MG: Performed by: PHYSICIAN ASSISTANT

## 2019-09-27 RX ORDER — MORPHINE SULFATE 4 MG/ML
2 INJECTION, SOLUTION INTRAMUSCULAR; INTRAVENOUS ONCE
Status: COMPLETED | OUTPATIENT
Start: 2019-09-27 | End: 2019-09-27

## 2019-09-27 RX ORDER — SODIUM CHLORIDE 0.9 % (FLUSH) 0.9 %
10 SYRINGE (ML) INJECTION AS NEEDED
Status: DISCONTINUED | OUTPATIENT
Start: 2019-09-27 | End: 2019-09-28 | Stop reason: HOSPADM

## 2019-09-27 RX ORDER — ONDANSETRON 4 MG/1
4 TABLET, ORALLY DISINTEGRATING ORAL EVERY 8 HOURS PRN
Qty: 10 TABLET | Refills: 0 | Status: SHIPPED | OUTPATIENT
Start: 2019-09-27 | End: 2020-04-29

## 2019-09-27 RX ADMIN — MORPHINE SULFATE 2 MG: 4 INJECTION INTRAVENOUS at 22:28

## 2019-09-27 RX ADMIN — SODIUM CHLORIDE 1000 ML: 900 INJECTION, SOLUTION INTRAVENOUS at 20:31

## 2019-09-27 RX ADMIN — PROMETHAZINE HYDROCHLORIDE 6.25 MG: 25 INJECTION INTRAMUSCULAR; INTRAVENOUS at 20:41

## 2019-10-08 RX ORDER — GLIPIZIDE 5 MG/1
TABLET ORAL
Qty: 30 TABLET | Refills: 1 | Status: SHIPPED | OUTPATIENT
Start: 2019-10-08 | End: 2019-12-11 | Stop reason: SDUPTHER

## 2019-10-20 RX ORDER — SYRINGE WITH NEEDLE, 1 ML 25GX5/8"
SYRINGE, EMPTY DISPOSABLE MISCELLANEOUS
Qty: 100 EACH | Refills: 5 | Status: SHIPPED | OUTPATIENT
Start: 2019-10-20 | End: 2020-10-23

## 2019-10-30 ENCOUNTER — OFFICE VISIT (OUTPATIENT)
Dept: CARDIOLOGY | Facility: CLINIC | Age: 69
End: 2019-10-30

## 2019-10-30 VITALS
WEIGHT: 182 LBS | HEIGHT: 68 IN | BODY MASS INDEX: 27.58 KG/M2 | OXYGEN SATURATION: 97 % | DIASTOLIC BLOOD PRESSURE: 82 MMHG | HEART RATE: 67 BPM | SYSTOLIC BLOOD PRESSURE: 128 MMHG | RESPIRATION RATE: 18 BRPM

## 2019-10-30 DIAGNOSIS — I73.9 PERIPHERAL ARTERY DISEASE (HCC): ICD-10-CM

## 2019-10-30 DIAGNOSIS — I25.10 CORONARY ARTERY DISEASE INVOLVING NATIVE CORONARY ARTERY OF NATIVE HEART WITHOUT ANGINA PECTORIS: Primary | ICD-10-CM

## 2019-10-30 DIAGNOSIS — I10 ESSENTIAL HYPERTENSION: ICD-10-CM

## 2019-10-30 DIAGNOSIS — E78.2 MIXED HYPERLIPIDEMIA: ICD-10-CM

## 2019-10-30 PROCEDURE — 99214 OFFICE O/P EST MOD 30 MIN: CPT | Performed by: INTERNAL MEDICINE

## 2019-10-30 PROCEDURE — 93000 ELECTROCARDIOGRAM COMPLETE: CPT | Performed by: INTERNAL MEDICINE

## 2019-10-30 NOTE — PROGRESS NOTES
"Cardiology Office Visit      Encounter Date:  10/30/2019    Patient ID:   Merlin Trujillo is a 69 y.o. male.    Reason For Followup:  Peripheral artery disease  Carotid stenosis  Hypertension  Hyperlipidemia    Brief Clinical History:  Dear Dr. Lyell, Reggie Duane, MD    I had the pleasure of seeing Merlin Trujillo today. As you are well aware, this is a 69 y.o. male with past medical history that is significant for history of hypertension hyperlipidemia  and peripheral arterial disease here for follow-up    Patient had symptoms of TIA with amaurosis fugax in the right eye.  Noted to have significant carotid stenosis in the right carotid artery  Status post a successful right carotid endarterectomy  Patient had recent hospitalization with urosepsis  Echocardiogram showed normal LV systolic function        Interval History:  He denies any cardiac symptoms at this time    Assessment & Plan    Impressions:     Hypertension   hyperlipidemia   peripheral arterial disease   presumed coronary artery disease but no evidence of any inducible ischemia on the stress test    Recommendations:  Patient denies any cardiac symptoms of chest pain shortness of breath dizziness or syncope   continued aggressive risk factor modification   regular exercise   labs with primary care physician office   follow up in office in 12 months    Objective:    Vitals:  Vitals:    10/30/19 1259   BP: 128/82   BP Location: Left arm   Pulse: 67   Resp: 18   SpO2: 97%   Weight: 82.6 kg (182 lb)   Height: 172.7 cm (68\")       Physical Exam:    General: Alert, cooperative, no distress, appears stated age  Head:  Normocephalic, atraumatic, mucous membranes moist  Eyes:  Conjunctiva/corneas clear, EOM's intact     Neck:  Supple,  no adenopathy;      Lungs: Clear to auscultation bilaterally, no wheezes rhonchi rales are noted  Chest wall: No tenderness  Heart::  Regular rate and rhythm, S1 and S2 normal, no murmur, rub or gallop  Abdomen: Soft, " "non-tender, nondistended bowel sounds active  Extremities: No cyanosis, clubbing, or edema  Pulses: 2+ and symmetric all extremities  Skin:  No rashes or lesions  Neuro/psych: A&O x3. CN II through XII are grossly intact with appropriate affect      Allergies:  No Known Allergies    Medication Review:     Current Outpatient Medications:   •  AMITIZA 24 MCG capsule, Take 1 capsule by mouth 2 (Two) Times a Day., Disp: 60 capsule, Rfl: 11  •  amLODIPine (NORVASC) 5 MG tablet, Every 12 (Twelve) Hours., Disp: , Rfl:   •  aspirin 81 MG EC tablet, Take 81 mg by mouth Daily., Disp: , Rfl:   •  atenolol (TENORMIN) 50 MG tablet, TAKE 1/2 TABLET BY MOUTH TWICE DAILY, Disp: 30 tablet, Rfl: 1  •  atorvastatin (LIPITOR) 40 MG tablet, TAKE ONE TABLET BY MOUTH EVERY NIGHT AT BEDTIME, Disp: 30 tablet, Rfl: 6  •  B-D 3CC LUER-RONY SYR 25GX1\" 25G X 1\" 3 ML misc, USE AS DIRECTED WITH B-12 INJECTION, Disp: 100 each, Rfl: 5  •  baclofen (LIORESAL) 10 MG tablet, Take 1 tablet by mouth 3 (Three) Times a Day., Disp: 90 tablet, Rfl: 5  •  cyanocobalamin 1000 MCG/ML injection, INJECT 1ML MONTHLY, Disp: 1 mL, Rfl: 1  •  EMBEDA 20-0.8 MG capsule controlled-release, Take 1 capsule by mouth 2 (Two) Times a Day., Disp: 60 capsule, Rfl: 0  •  Gabapentin Enacarbil ER (HORIZANT) 600 MG tablet controlled-release, Take 600 mg by mouth 2 (Two) Times a Day., Disp: 60 tablet, Rfl: 11  •  glipizide (GLUCOTROL) 5 MG tablet, TAKE ONE TABLET BY MOUTH EVERY DAY, Disp: 30 tablet, Rfl: 1  •  ondansetron ODT (ZOFRAN-ODT) 4 MG disintegrating tablet, Take 1 tablet by mouth Every 8 (Eight) Hours As Needed for Nausea., Disp: 10 tablet, Rfl: 0  •  pentoxifylline (TRENtal) 400 MG CR tablet, TAKE ONE TABLET BY MOUTH THREE TIMES DAILY, Disp: 90 tablet, Rfl: 4    Family History:  Family History   Problem Relation Age of Onset   • Dementia Mother    • Heart disease Mother    • COPD Father    • Stroke Father    • Heart disease Father    • Hypertension Sister    • Diabetes " Sister    • Hypertension Brother        Past Medical History:  Past Medical History:   Diagnosis Date   • Adenomatous polyps    • Amaurosis fugax, right eye    • Arm pain     lt  workmans comp   • BPH (benign prostatic hyperplasia)    • CAD (coronary artery disease)    • CKD (chronic kidney disease) stage 3, GFR 30-59 ml/min (CMS/[facility])    • CVA, old, speech/language deficit    • Diabetes (CMS/[facility])    • Diabetic acetonemia (CMS/[facility])    • Fractures    • Hearing loss    • Hyperlipidemia    • Hypertension    • Joint pain    • Kidney stones    • Low back pain    • Neuropathic pain    • Obstructive uropathy    • Shoulder pain    • Sleep disorder    • Spondylosis, cervical        Past surgical History:  Past Surgical History:   Procedure Laterality Date   • ANKLE SURGERY      replacement  rt   • CAROTID ARTERY ANGIOPLASTY     • CARPAL TUNNEL RELEASE     • COLONOSCOPY     • CYSTOSCOPY URETEROSCOPY LASER LITHOTRIPSY      kidney stones   • EYE SURGERY      mesh  and plate under rt eye due to orbital fx   • NOSE SURGERY      x2   • TEETH EXTRACTION      dentures       Social History:  Social History     Socioeconomic History   • Marital status:      Spouse name: Not on file   • Number of children: Not on file   • Years of education: Not on file   • Highest education level: Not on file   Tobacco Use   • Smoking status: Never Smoker   • Smokeless tobacco: Never Used   Substance and Sexual Activity   • Alcohol use: No     Frequency: Never   • Drug use: Defer   • Sexual activity: Defer       Review of Systems:  The following systems were reviewed as they relate to the cardiovascular system: Constitutional, Eyes, ENT, Cardiovascular, Respiratory, Gastrointestinal, Integumentary, Neurological, Psychiatric, Hematologic, Endocrine, Musculoskeletal, and Genitourinary. The pertinent cardiovascular findings are reported above with all other cardiovascular points within those systems being negative.    Diagnostic Study Review:      Current Electrocardiogram:    ECG 12 Lead  Date/Time: 10/30/2019 1:53 PM  Performed by: Yury Iraheta MD  Authorized by: Yury Iraheta MD   Comparison: compared with previous ECG   Similar to previous ECG  Rhythm: sinus rhythm  Ectopy: unifocal PVCs  Rate: normal  BPM: 67  Conduction: conduction normal  Q waves: III and aVF    QRS axis: normal    Clinical impression: abnormal EKG              NOTE: The following portions of the patient's history were reviewed and updated this visit as appropriate: allergies, current medications, past family history, past medical history, past social history, past surgical history and problem list.

## 2019-11-03 RX ORDER — CYANOCOBALAMIN 1000 UG/ML
INJECTION, SOLUTION INTRAMUSCULAR; SUBCUTANEOUS
Qty: 1 ML | Refills: 1 | Status: SHIPPED | OUTPATIENT
Start: 2019-11-03 | End: 2020-01-10

## 2019-11-03 RX ORDER — ATENOLOL 50 MG/1
TABLET ORAL
Qty: 30 TABLET | Refills: 1 | Status: SHIPPED | OUTPATIENT
Start: 2019-11-03 | End: 2020-01-10

## 2019-11-07 ENCOUNTER — LAB (OUTPATIENT)
Dept: LAB | Facility: HOSPITAL | Age: 69
End: 2019-11-07

## 2019-11-07 ENCOUNTER — TRANSCRIBE ORDERS (OUTPATIENT)
Dept: LAB | Facility: HOSPITAL | Age: 69
End: 2019-11-07

## 2019-11-07 DIAGNOSIS — Z98.890 HISTORY OF ARTHROPLASTY OF RIGHT ANKLE: Primary | ICD-10-CM

## 2019-11-07 DIAGNOSIS — Z98.890 HISTORY OF ARTHROPLASTY OF RIGHT ANKLE: ICD-10-CM

## 2019-11-07 DIAGNOSIS — R79.89 OTHER SPECIFIED ABNORMAL FINDINGS OF BLOOD CHEMISTRY: ICD-10-CM

## 2019-11-07 LAB
BASOPHILS # BLD AUTO: 0.02 10*3/MM3 (ref 0–0.2)
BASOPHILS NFR BLD AUTO: 0.3 % (ref 0–1.5)
CRP SERPL-MCNC: 0.75 MG/DL (ref 0–0.5)
DEPRECATED RDW RBC AUTO: 39.5 FL (ref 37–54)
EOSINOPHIL # BLD AUTO: 0.12 10*3/MM3 (ref 0–0.4)
EOSINOPHIL NFR BLD AUTO: 1.6 % (ref 0.3–6.2)
ERYTHROCYTE [DISTWIDTH] IN BLOOD BY AUTOMATED COUNT: 12.5 % (ref 12.3–15.4)
ERYTHROCYTE [SEDIMENTATION RATE] IN BLOOD: 28 MM/HR (ref 0–20)
HCT VFR BLD AUTO: 38.4 % (ref 37.5–51)
HGB BLD-MCNC: 12.7 G/DL (ref 13–17.7)
IMM GRANULOCYTES # BLD AUTO: 0.04 10*3/MM3 (ref 0–0.05)
IMM GRANULOCYTES NFR BLD AUTO: 0.5 % (ref 0–0.5)
LYMPHOCYTES # BLD AUTO: 1.47 10*3/MM3 (ref 0.7–3.1)
LYMPHOCYTES NFR BLD AUTO: 20 % (ref 19.6–45.3)
MCH RBC QN AUTO: 28.5 PG (ref 26.6–33)
MCHC RBC AUTO-ENTMCNC: 33.1 G/DL (ref 31.5–35.7)
MCV RBC AUTO: 86.3 FL (ref 79–97)
MONOCYTES # BLD AUTO: 0.52 10*3/MM3 (ref 0.1–0.9)
MONOCYTES NFR BLD AUTO: 7.1 % (ref 5–12)
NEUTROPHILS # BLD AUTO: 5.17 10*3/MM3 (ref 1.7–7)
NEUTROPHILS NFR BLD AUTO: 70.5 % (ref 42.7–76)
NRBC BLD AUTO-RTO: 0 /100 WBC (ref 0–0.2)
PLATELET # BLD AUTO: 293 10*3/MM3 (ref 140–450)
PMV BLD AUTO: 10.2 FL (ref 6–12)
RBC # BLD AUTO: 4.45 10*6/MM3 (ref 4.14–5.8)
WBC NRBC COR # BLD: 7.34 10*3/MM3 (ref 3.4–10.8)

## 2019-11-07 PROCEDURE — 86140 C-REACTIVE PROTEIN: CPT

## 2019-11-07 PROCEDURE — 85652 RBC SED RATE AUTOMATED: CPT

## 2019-11-07 PROCEDURE — 36415 COLL VENOUS BLD VENIPUNCTURE: CPT

## 2019-11-07 PROCEDURE — 85025 COMPLETE CBC W/AUTO DIFF WBC: CPT

## 2019-11-15 ENCOUNTER — OFFICE VISIT (OUTPATIENT)
Dept: PAIN MEDICINE | Facility: CLINIC | Age: 69
End: 2019-11-15

## 2019-11-15 VITALS
SYSTOLIC BLOOD PRESSURE: 143 MMHG | HEIGHT: 68 IN | DIASTOLIC BLOOD PRESSURE: 75 MMHG | WEIGHT: 175 LBS | HEART RATE: 73 BPM | OXYGEN SATURATION: 95 % | RESPIRATION RATE: 16 BRPM | TEMPERATURE: 98.5 F | BODY MASS INDEX: 26.52 KG/M2

## 2019-11-15 DIAGNOSIS — M79.2 NEUROPATHIC PAIN: ICD-10-CM

## 2019-11-15 DIAGNOSIS — G89.4 CHRONIC PAIN SYNDROME: Primary | ICD-10-CM

## 2019-11-15 DIAGNOSIS — M47.812 CERVICAL SPONDYLOSIS WITHOUT MYELOPATHY: ICD-10-CM

## 2019-11-15 DIAGNOSIS — M79.18 MYOFASCIAL PAIN SYNDROME: ICD-10-CM

## 2019-11-15 DIAGNOSIS — Z79.899 HIGH RISK MEDICATION USE: ICD-10-CM

## 2019-11-15 DIAGNOSIS — M25.50 POLYARTHRALGIA: ICD-10-CM

## 2019-11-15 PROCEDURE — 99214 OFFICE O/P EST MOD 30 MIN: CPT | Performed by: ANESTHESIOLOGY

## 2019-11-15 PROCEDURE — G0463 HOSPITAL OUTPT CLINIC VISIT: HCPCS | Performed by: ANESTHESIOLOGY

## 2019-11-15 RX ORDER — MORPHINE SULFATE 15 MG/1
15 TABLET, FILM COATED, EXTENDED RELEASE ORAL 3 TIMES DAILY PRN
Qty: 90 TABLET | Refills: 0 | Status: SHIPPED | OUTPATIENT
Start: 2019-11-15 | End: 2019-11-15 | Stop reason: SDUPTHER

## 2019-11-15 RX ORDER — MORPHINE SULFATE 15 MG/1
15 TABLET, FILM COATED, EXTENDED RELEASE ORAL 3 TIMES DAILY PRN
Qty: 90 TABLET | Refills: 0 | Status: SHIPPED | OUTPATIENT
Start: 2019-11-15 | End: 2020-01-17 | Stop reason: SDUPTHER
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
## 2019-12-11 RX ORDER — PENTOXIFYLLINE 400 MG/1
TABLET, EXTENDED RELEASE ORAL
Qty: 90 TABLET | Refills: 4 | Status: SHIPPED | OUTPATIENT
Start: 2019-12-11 | End: 2020-04-29

## 2019-12-11 RX ORDER — GLIPIZIDE 5 MG/1
TABLET ORAL
Qty: 30 TABLET | Refills: 1 | Status: SHIPPED | OUTPATIENT
Start: 2019-12-11 | End: 2020-02-08

## 2019-12-16 RX ORDER — BLOOD SUGAR DIAGNOSTIC
STRIP MISCELLANEOUS
Qty: 100 EACH | Refills: 5 | Status: SHIPPED | OUTPATIENT
Start: 2019-12-16 | End: 2021-03-09

## 2019-12-16 RX ORDER — LANCETS
EACH MISCELLANEOUS
Qty: 100 EACH | Refills: 5 | Status: SHIPPED | OUTPATIENT
Start: 2019-12-16 | End: 2021-11-01

## 2020-01-03 ENCOUNTER — ON CAMPUS - OUTPATIENT (AMBULATORY)
Dept: URBAN - METROPOLITAN AREA HOSPITAL 2 | Facility: HOSPITAL | Age: 70
End: 2020-01-03
Payer: MEDICARE

## 2020-01-03 VITALS
OXYGEN SATURATION: 98 % | SYSTOLIC BLOOD PRESSURE: 166 MMHG | OXYGEN SATURATION: 99 % | SYSTOLIC BLOOD PRESSURE: 135 MMHG | DIASTOLIC BLOOD PRESSURE: 83 MMHG | HEART RATE: 85 BPM | HEART RATE: 74 BPM | HEIGHT: 68 IN | OXYGEN SATURATION: 96 % | HEART RATE: 72 BPM | DIASTOLIC BLOOD PRESSURE: 90 MMHG | DIASTOLIC BLOOD PRESSURE: 80 MMHG | HEART RATE: 77 BPM | HEART RATE: 80 BPM | OXYGEN SATURATION: 100 % | SYSTOLIC BLOOD PRESSURE: 185 MMHG | RESPIRATION RATE: 18 BRPM | SYSTOLIC BLOOD PRESSURE: 164 MMHG | DIASTOLIC BLOOD PRESSURE: 86 MMHG | TEMPERATURE: 98.4 F | HEART RATE: 94 BPM | WEIGHT: 184 LBS | DIASTOLIC BLOOD PRESSURE: 99 MMHG | SYSTOLIC BLOOD PRESSURE: 162 MMHG | SYSTOLIC BLOOD PRESSURE: 161 MMHG

## 2020-01-03 DIAGNOSIS — K57.30 DIVERTICULOSIS OF LARGE INTESTINE WITHOUT PERFORATION OR ABS: ICD-10-CM

## 2020-01-03 DIAGNOSIS — Z86.010 PERSONAL HISTORY OF COLONIC POLYPS: ICD-10-CM

## 2020-01-03 DIAGNOSIS — K64.8 OTHER HEMORRHOIDS: ICD-10-CM

## 2020-01-03 PROCEDURE — G0105 COLORECTAL SCRN; HI RISK IND: HCPCS | Performed by: INTERNAL MEDICINE

## 2020-01-10 RX ORDER — AMLODIPINE BESYLATE 5 MG/1
TABLET ORAL
Qty: 180 TABLET | Refills: 3 | Status: SHIPPED | OUTPATIENT
Start: 2020-01-10 | End: 2021-01-27

## 2020-01-10 RX ORDER — ATENOLOL 50 MG/1
TABLET ORAL
Qty: 30 TABLET | Refills: 1 | Status: SHIPPED | OUTPATIENT
Start: 2020-01-10 | End: 2020-03-05

## 2020-01-10 RX ORDER — CYANOCOBALAMIN 1000 UG/ML
INJECTION, SOLUTION INTRAMUSCULAR; SUBCUTANEOUS
Qty: 1 ML | Refills: 1 | Status: SHIPPED | OUTPATIENT
Start: 2020-01-10 | End: 2020-03-05

## 2020-01-17 ENCOUNTER — OFFICE VISIT (OUTPATIENT)
Dept: PAIN MEDICINE | Facility: CLINIC | Age: 70
End: 2020-01-17

## 2020-01-17 ENCOUNTER — RESULTS ENCOUNTER (OUTPATIENT)
Dept: PAIN MEDICINE | Facility: HOSPITAL | Age: 70
End: 2020-01-17

## 2020-01-17 VITALS
RESPIRATION RATE: 16 BRPM | HEIGHT: 68 IN | DIASTOLIC BLOOD PRESSURE: 81 MMHG | HEART RATE: 66 BPM | SYSTOLIC BLOOD PRESSURE: 145 MMHG | TEMPERATURE: 98.2 F | BODY MASS INDEX: 27.28 KG/M2 | OXYGEN SATURATION: 99 % | WEIGHT: 180 LBS

## 2020-01-17 DIAGNOSIS — M47.812 CERVICAL SPONDYLOSIS WITHOUT MYELOPATHY: ICD-10-CM

## 2020-01-17 DIAGNOSIS — G89.4 CHRONIC PAIN SYNDROME: Primary | ICD-10-CM

## 2020-01-17 DIAGNOSIS — F19.90 CURRENT DRUG USE: Primary | ICD-10-CM

## 2020-01-17 DIAGNOSIS — Z79.899 HIGH RISK MEDICATION USE: ICD-10-CM

## 2020-01-17 DIAGNOSIS — M25.50 POLYARTHRALGIA: ICD-10-CM

## 2020-01-17 DIAGNOSIS — F19.90 CURRENT DRUG USE: ICD-10-CM

## 2020-01-17 DIAGNOSIS — M79.2 NEUROPATHIC PAIN: ICD-10-CM

## 2020-01-17 PROCEDURE — G0463 HOSPITAL OUTPT CLINIC VISIT: HCPCS | Performed by: ANESTHESIOLOGY

## 2020-01-17 PROCEDURE — 99214 OFFICE O/P EST MOD 30 MIN: CPT | Performed by: ANESTHESIOLOGY

## 2020-01-17 RX ORDER — MORPHINE SULFATE 15 MG/1
15 TABLET, FILM COATED, EXTENDED RELEASE ORAL 3 TIMES DAILY PRN
Qty: 90 TABLET | Refills: 0 | Status: SHIPPED | OUTPATIENT
Start: 2020-01-17 | End: 2020-03-10 | Stop reason: SDUPTHER

## 2020-01-17 RX ORDER — MORPHINE SULFATE 15 MG/1
15 TABLET, FILM COATED, EXTENDED RELEASE ORAL 3 TIMES DAILY PRN
Qty: 90 TABLET | Refills: 0 | Status: SHIPPED | OUTPATIENT
Start: 2020-01-17 | End: 2020-01-17 | Stop reason: SDUPTHER
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
## 2020-02-07 RX ORDER — BACLOFEN 10 MG/1
TABLET ORAL
Qty: 90 TABLET | Refills: 4 | OUTPATIENT
Start: 2020-02-07

## 2020-02-08 RX ORDER — GLIPIZIDE 5 MG/1
TABLET ORAL
Qty: 30 TABLET | Refills: 0 | Status: SHIPPED | OUTPATIENT
Start: 2020-02-08 | End: 2020-04-02

## 2020-02-08 RX ORDER — ATORVASTATIN CALCIUM 40 MG/1
TABLET, FILM COATED ORAL
Qty: 30 TABLET | Refills: 5 | Status: SHIPPED | OUTPATIENT
Start: 2020-02-08 | End: 2020-10-02 | Stop reason: SDUPTHER

## 2020-02-11 RX ORDER — BACLOFEN 10 MG/1
TABLET ORAL
Qty: 90 TABLET | Refills: 4 | Status: SHIPPED | OUTPATIENT
Start: 2020-02-11 | End: 2020-05-30

## 2020-02-27 ENCOUNTER — OFFICE VISIT (OUTPATIENT)
Dept: FAMILY MEDICINE CLINIC | Facility: CLINIC | Age: 70
End: 2020-02-27

## 2020-02-27 VITALS
WEIGHT: 189 LBS | HEART RATE: 74 BPM | OXYGEN SATURATION: 99 % | HEIGHT: 68 IN | BODY MASS INDEX: 28.64 KG/M2 | TEMPERATURE: 97.8 F | DIASTOLIC BLOOD PRESSURE: 84 MMHG | SYSTOLIC BLOOD PRESSURE: 135 MMHG | RESPIRATION RATE: 16 BRPM

## 2020-02-27 DIAGNOSIS — N40.0 ENLARGED PROSTATE WITHOUT LOWER URINARY TRACT SYMPTOMS (LUTS): ICD-10-CM

## 2020-02-27 DIAGNOSIS — G89.4 CHRONIC PAIN SYNDROME: ICD-10-CM

## 2020-02-27 DIAGNOSIS — E11.65 TYPE 2 DIABETES MELLITUS WITH HYPERGLYCEMIA, WITHOUT LONG-TERM CURRENT USE OF INSULIN (HCC): ICD-10-CM

## 2020-02-27 DIAGNOSIS — M79.2 NEUROPATHIC PAIN: ICD-10-CM

## 2020-02-27 DIAGNOSIS — N18.30 CHRONIC KIDNEY DISEASE, STAGE III (MODERATE) (HCC): ICD-10-CM

## 2020-02-27 DIAGNOSIS — G47.9 SLEEP DISORDER: ICD-10-CM

## 2020-02-27 DIAGNOSIS — M47.812 OSTEOARTHRITIS OF CERVICAL SPINE WITHOUT MYELOPATHY: ICD-10-CM

## 2020-02-27 DIAGNOSIS — I25.10 CHRONIC CORONARY ARTERY DISEASE: ICD-10-CM

## 2020-02-27 DIAGNOSIS — Z12.5 SCREENING PSA (PROSTATE SPECIFIC ANTIGEN): ICD-10-CM

## 2020-02-27 DIAGNOSIS — R31.21 ASYMPTOMATIC MICROSCOPIC HEMATURIA: ICD-10-CM

## 2020-02-27 DIAGNOSIS — D12.6 ADENOMATOUS POLYP OF COLON, UNSPECIFIED PART OF COLON: ICD-10-CM

## 2020-02-27 DIAGNOSIS — M19.012 PRIMARY OSTEOARTHRITIS OF LEFT SHOULDER: ICD-10-CM

## 2020-02-27 DIAGNOSIS — R26.81 UNSTEADY GAIT: ICD-10-CM

## 2020-02-27 DIAGNOSIS — R40.4 TRANSIENT ALTERATION OF AWARENESS: ICD-10-CM

## 2020-02-27 DIAGNOSIS — I10 ESSENTIAL HYPERTENSION: ICD-10-CM

## 2020-02-27 DIAGNOSIS — Z00.00 MEDICARE ANNUAL WELLNESS VISIT, SUBSEQUENT: Primary | ICD-10-CM

## 2020-02-27 DIAGNOSIS — E78.2 MIXED HYPERLIPIDEMIA: ICD-10-CM

## 2020-02-27 DIAGNOSIS — I65.23 BILATERAL CAROTID ARTERY STENOSIS: ICD-10-CM

## 2020-02-27 PROBLEM — Z87.828 HISTORY OF HEAD INJURY: Status: RESOLVED | Noted: 2017-01-26 | Resolved: 2020-02-27

## 2020-02-27 PROBLEM — G45.3 AMAUROSIS FUGAX: Status: RESOLVED | Noted: 2017-03-27 | Resolved: 2020-02-27

## 2020-02-27 LAB
BILIRUB BLD-MCNC: NEGATIVE MG/DL
CLARITY, POC: CLEAR
COLOR UR: YELLOW
GLUCOSE UR STRIP-MCNC: ABNORMAL MG/DL
KETONES UR QL: NEGATIVE
LEUKOCYTE EST, POC: NEGATIVE
NITRITE UR-MCNC: NEGATIVE MG/ML
PH UR: 5 [PH] (ref 5–8)
PROT UR STRIP-MCNC: NEGATIVE MG/DL
RBC # UR STRIP: ABNORMAL /UL
SP GR UR: 1.01 (ref 1–1.03)
UROBILINOGEN UR QL: NORMAL

## 2020-02-27 PROCEDURE — G0439 PPPS, SUBSEQ VISIT: HCPCS | Performed by: FAMILY MEDICINE

## 2020-02-27 PROCEDURE — 99214 OFFICE O/P EST MOD 30 MIN: CPT | Performed by: FAMILY MEDICINE

## 2020-02-27 PROCEDURE — 81003 URINALYSIS AUTO W/O SCOPE: CPT | Performed by: FAMILY MEDICINE

## 2020-02-27 NOTE — PROGRESS NOTES
Answers for HPI/ROS submitted by the patient on 2/20/2020   What is the primary reason for your visit?: Physical  Patient presents for follow-up of uncontrolled diabetes as well as hypertension, hyperlipidemia, heart disease, and chronic kidney disease.  Patient's spouse has called stating that he is having some significant cognitive changes and forgetting simple things such as closing a car door when he leaves.  He has not had a headache.  He denies any hypoglycemic episodes.  He is on long-term pain medications including morphine and gabapentin which have effect on memory.  He is also had a history of a CVA with speech deficit that has improved.  Also, patient had requested that we do his annual wellness visit while here for follow-up.      Past Medical History:   Diagnosis Date   • Adenomatous polyps    • Amaurosis fugax 3/27/2017   • BPH (benign prostatic hyperplasia)    • CAD (coronary artery disease)    • CKD (chronic kidney disease) stage 3, GFR 30-59 ml/min (CMS/HCC)    • CVA, old, speech/language deficit    • Diabetes (CMS/HCC)    • Diabetic acetonemia (CMS/HCC)    • Hearing loss    • Hematuria    • Hyperlipidemia    • Hypertension    • Kidney stones    • Neuropathic pain    • Obstructive uropathy    • Sleep disorder    • Spondylosis, cervical      Past Surgical History:   Procedure Laterality Date   • ANKLE SURGERY      replacement  rt   • CAROTID ARTERY ANGIOPLASTY     • CARPAL TUNNEL RELEASE     • COLONOSCOPY     • CYSTOSCOPY URETEROSCOPY LASER LITHOTRIPSY      kidney stones   • EYE SURGERY      mesh  and plate under rt eye due to orbital fx   • NOSE SURGERY      x2   • TEETH EXTRACTION      dentures     Family History   Problem Relation Age of Onset   • Dementia Mother    • Heart disease Mother    • COPD Father    • Stroke Father    • Heart disease Father    • Hypertension Sister    • Diabetes Sister    • Hypertension Brother      Social History     Tobacco Use   • Smoking status: Never Smoker   •  "Smokeless tobacco: Never Used   Substance Use Topics   • Alcohol use: No     Frequency: Never     Current Outpatient Medications on File Prior to Visit   Medication Sig Dispense Refill   • ACCU-CHEK FASTCLIX LANCETS misc TEST EVERY DAY AS DIRECTED 100 each 5   • ACCU-CHEK GUIDE test strip TEST ONCE DAILY AS DIRECTED 100 each 5   • AMITIZA 24 MCG capsule Take 1 capsule by mouth 2 (Two) Times a Day. 60 capsule 11   • amLODIPine (NORVASC) 5 MG tablet TAKE ONE TABLET BY MOUTH TWICE DAILY 180 tablet 3   • aspirin 81 MG EC tablet Take 81 mg by mouth Daily.     • atenolol (TENORMIN) 50 MG tablet TAKE 1/2 TABLET BY MOUTH TWICE DAILY 30 tablet 1   • atorvastatin (LIPITOR) 40 MG tablet T1T PO QHS 30 tablet 5   • B-D 3CC LUER-RONY SYR 25GX1\" 25G X 1\" 3 ML misc USE AS DIRECTED WITH B-12 INJECTION 100 each 5   • baclofen (LIORESAL) 10 MG tablet TAKE ONE TABLET BY MOUTH THREE TIMES DAILY 90 tablet 4   • cyanocobalamin 1000 MCG/ML injection INJECT 1ML MONTHLY 1 mL 1   • Gabapentin Enacarbil ER (HORIZANT) 600 MG tablet controlled-release Take 600 mg by mouth 3 (Three) Times a Day. 60 tablet 11   • glipizide (GLUCOTROL) 5 MG tablet TAKE ONE TABLET BY MOUTH EVERY DAY 30 tablet 0   • Morphine (MS CONTIN) 15 MG 12 hr tablet Take 1 tablet by mouth 3 (Three) Times a Day As Needed for Severe Pain . 90 tablet 0   • pentoxifylline (TRENtal) 400 MG CR tablet TAKE ONE TABLET BY MOUTH THREE TIMES DAILY 90 tablet 4   • ondansetron ODT (ZOFRAN-ODT) 4 MG disintegrating tablet Take 1 tablet by mouth Every 8 (Eight) Hours As Needed for Nausea. 10 tablet 0     No current facility-administered medications on file prior to visit.       Vitals:    02/27/20 1037 02/27/20 1100   BP: 161/91 135/84   BP Location: Left arm    Patient Position: Sitting    Cuff Size: Adult    Pulse: 74    Resp: 16    Temp: 97.8 °F (36.6 °C)    TempSrc: Oral    SpO2: 99%    Weight: 85.7 kg (189 lb)    Height: 172.7 cm (68\")      Body mass index is 28.74 kg/m².  Diagnoses and " all orders for this visit:    1. Medicare annual wellness visit, subsequent (Primary)  Assessment & Plan:  Discussed injury prevention, diet and exercise, safe sexual practices, and screening for common diseases. Encouraged use of sunscreen and seatbelts. Discussed timing of colon cancer cancer screening, prostate cancer screening, and review of skin for lesions. Avoidance of tobacco encouraged. Limitation or avoidance of alcohol encouraged. Recommend yearly dental and eye exams. Also discussed monitoring of blood pressure and lipids.        2. Bilateral carotid artery stenosis  Assessment & Plan:  Ultrasound reviewed from 2018 showing a right interna carotid at 16 to 49% stenosis with left at 50 to 75% stenosis.  Continue yearly vascular surgery follow-up.        3. Chronic coronary artery disease  Assessment & Plan:  Currently stable with no chest pain.  No change in minimal activity.  Report any chest pain.      4. Essential hypertension  Assessment & Plan:  Stable.  Continue current medications.    Orders:  -     Comprehensive Metabolic Panel  -     CBC & Differential    5. Mixed hyperlipidemia  Assessment & Plan:  Aggressive modification encouraged.  Recheck blood work and call with results.    Orders:  -     Lipid Panel    6. Type 2 diabetes mellitus with hyperglycemia, without long-term current use of insulin (CMS/[facility])  Assessment & Plan:  Uncontrolled.  Continue medications and try to watch diet a little bit better.  We will recheck in 3 months and if no better may need to consider insulin.    Orders:  -     Hemoglobin A1c    7. Asymptomatic microscopic hematuria  -     POC Urinalysis Dipstick, Automated    8. Neuropathic pain    9. Chronic pain syndrome  Assessment & Plan:  Contributing factor in almost everything we do.  Patient is unwilling to wean off of pain medications despite cognitive effects.  Still complains of severe pain in multiple sites.  He will continue pain management follow-up.      10.  Primary osteoarthritis of left shoulder    11. Chronic kidney disease, stage III (moderate) (CMS/HCC)  Assessment & Plan:  Possibly related to diabetes.  Continue avoiding NSAIDs.  Repeat CMP to watch renal function.      12. Enlarged prostate without lower urinary tract symptoms (luts)  Assessment & Plan:  Patient is having some nocturia that is increased.  Avoid fluids in the evening.  I am hesitant to start alpha blockers due to cognitive issues.      13. Transient alteration of awareness  Assessment & Plan:  Patient does not see this is a problem but his spouse does.  She relayed this to us by phone.  We will look at blood work but I think if he is going to continue with morphine and gabapentin that determining etiology will be extremely difficult.  They will report any worsening.      14. Sleep disorder    15. Unsteady gait    16. Screening PSA (prostate specific antigen)  -     PSA Screen    17. Adenomatous polyp of colon, unspecified part of colon    18. Osteoarthritis of cervical 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    Other orders  -     PSA Screen      Chief Complaint   Patient presents with   • Medicare Wellness-subsequent       Subjective   History of Present Illness:  Merlin Morales is a 70 y.o. male who presents for a Subsequent Medicare Wellness Visit.    HEALTH RISK ASSESSMENT    Recent Hospitalizations:  No hospitalization(s) within the last year.    Current Medical Providers:  Patient Care Team:  Lyell, Reggie Duane, MD as PCP - General (Family Medicine)  Lyell, Reggie Duane, MD as PCP - Claims Attributed  Alice Bagley MD as Consulting Physician (Pain Medicine)  Yury Iraheta MD as Consulting Physician (Cardiology)  Edgar Abdi MD as Consulting Physician (Neurology)    Smoking Status:  Social History     Tobacco Use   Smoking Status Never Smoker   Smokeless Tobacco Never Used       Alcohol Consumption:  Social History     Substance and Sexual Activity   Alcohol Use No   • Frequency:  Never       Depression Screen:   PHQ-2/PHQ-9 Depression Screening 2/27/2020   Little interest or pleasure in doing things 0   Feeling down, depressed, or hopeless 0   Trouble falling or staying asleep, or sleeping too much 0   Feeling tired or having little energy 0   Poor appetite or overeating 0   Feeling bad about yourself - or that you are a failure or have let yourself or your family down 0   Trouble concentrating on things, such as reading the newspaper or watching television 0   Moving or speaking so slowly that other people could have noticed. Or the opposite - being so fidgety or restless that you have been moving around a lot more than usual 0   Thoughts that you would be better off dead, or of hurting yourself in some way 0   Total Score 0   If you checked off any problems, how difficult have these problems made it for you to do your work, take care of things at home, or get along with other people? Not difficult at all       Fall Risk Screen:  CHERYL Fall Risk Assessment was completed, and patient is at LOW risk for falls.Assessment completed on:2/27/2020    Health Habits and Functional and Cognitive Screening:  Functional & Cognitive Status 2/27/2020   Do you have difficulty preparing food and eating? No   Do you have difficulty bathing yourself, getting dressed or grooming yourself? No   Do you have difficulty using the toilet? No   Do you have difficulty moving around from place to place? No   Do you have trouble with steps or getting out of a bed or a chair? No   Current Diet Well Balanced Diet   Dental Exam Up to date   Eye Exam Up to date   Exercise (times per week) 4 times per week   Current Exercise Activities Include Swimming   Do you need help using the phone?  No   Are you deaf or do you have serious difficulty hearing?  No   Do you need help with transportation? No   Do you need help shopping? No   Do you need help preparing meals?  No   Do you need help with housework?  No   Do you need help  with laundry? No   Do you need help taking your medications? No   Do you need help managing money? No   Do you ever drive or ride in a car without wearing a seat belt? No   Have you felt unusual stress, anger or loneliness in the last month? No   Who do you live with? Other   If you need help, do you have trouble finding someone available to you? No   Have you been bothered in the last four weeks by sexual problems? No   Do you have difficulty concentrating, remembering or making decisions? No         Does the patient have evidence of cognitive impairment? Yes    Asprin use counseling:Taking ASA appropriately as indicated    Age-appropriate Screening Schedule:  Refer to the list below for future screening recommendations based on patient's age, sex and/or medical conditions. Orders for these recommended tests are listed in the plan section. The patient has been provided with a written plan.    Health Maintenance   Topic Date Due   • TDAP/TD VACCINES (1 - Tdap) 02/01/1961   • ZOSTER VACCINE (1 of 2) 02/01/2000   • PNEUMOCOCCAL VACCINE (65+ HIGH RISK) (2 of 2 - PPSV23) 09/28/2017   • DIABETIC FOOT EXAM  06/25/2019   • DIABETIC EYE EXAM  06/25/2019   • URINE MICROALBUMIN  08/26/2020   • HEMOGLOBIN A1C  08/27/2020   • LIPID PANEL  02/27/2021   • COLONOSCOPY  01/03/2030   • INFLUENZA VACCINE  Completed          The following portions of the patient's history were reviewed and updated as appropriate: allergies, current medications, past family history, past medical history, past social history, past surgical history and problem list.    Outpatient Medications Prior to Visit   Medication Sig Dispense Refill   • ACCU-CHEK FASTCLIX LANCETS misc TEST EVERY DAY AS DIRECTED 100 each 5   • ACCU-CHEK GUIDE test strip TEST ONCE DAILY AS DIRECTED 100 each 5   • AMITIZA 24 MCG capsule Take 1 capsule by mouth 2 (Two) Times a Day. 60 capsule 11   • amLODIPine (NORVASC) 5 MG tablet TAKE ONE TABLET BY MOUTH TWICE DAILY 180 tablet 3   •  "aspirin 81 MG EC tablet Take 81 mg by mouth Daily.     • atenolol (TENORMIN) 50 MG tablet TAKE 1/2 TABLET BY MOUTH TWICE DAILY 30 tablet 1   • atorvastatin (LIPITOR) 40 MG tablet T1T PO QHS 30 tablet 5   • B-D 3CC LUER-RONY SYR 25GX1\" 25G X 1\" 3 ML misc USE AS DIRECTED WITH B-12 INJECTION 100 each 5   • baclofen (LIORESAL) 10 MG tablet TAKE ONE TABLET BY MOUTH THREE TIMES DAILY 90 tablet 4   • cyanocobalamin 1000 MCG/ML injection INJECT 1ML MONTHLY 1 mL 1   • Gabapentin Enacarbil ER (HORIZANT) 600 MG tablet controlled-release Take 600 mg by mouth 3 (Three) Times a Day. 60 tablet 11   • glipizide (GLUCOTROL) 5 MG tablet TAKE ONE TABLET BY MOUTH EVERY DAY 30 tablet 0   • Morphine (MS CONTIN) 15 MG 12 hr tablet Take 1 tablet by mouth 3 (Three) Times a Day As Needed for Severe Pain . 90 tablet 0   • pentoxifylline (TRENtal) 400 MG CR tablet TAKE ONE TABLET BY MOUTH THREE TIMES DAILY 90 tablet 4   • ondansetron ODT (ZOFRAN-ODT) 4 MG disintegrating tablet Take 1 tablet by mouth Every 8 (Eight) Hours As Needed for Nausea. 10 tablet 0     No facility-administered medications prior to visit.        Patient Active Problem List   Diagnosis   • Neuropathic pain   • Other long term (current) drug therapy   • Abdominal distension   • Abnormal results of kidney function studies   • Adenomatous polyp of colon   • Altered mental status   • Arthralgia of right ankle   • Bilateral carotid artery stenosis   • Cervical radiculitis   • Chronic coronary artery disease   • Chronic kidney disease, stage III (moderate) (CMS/HCC)   • Chronic pain   • Edema leg   • Enlarged prostate without lower urinary tract symptoms (luts)   • Epidermoid cyst of neck   • Family history of diabetes mellitus   • Family history of stroke   • Fungal dermatitis   • Hearing loss   • Type 2 diabetes mellitus with hyperglycemia (CMS/HCC)   • Hyperlipidemia   • Hypertension   • Neck pain   • Low back pain   • Obstructive uropathy   • Osteoarthritis of cervical spine " without myelopathy   • Shoulder pain   • Sleep disorder   • Speech or language deficit following cerebrovascular accident   • Sudden visual loss   • Unsteady gait   • Left median nerve neuropathy   • Radial neuropathy   • Osteoarthritis of left shoulder   • Tear of left rotator cuff   • Ulnar neuropathy of left upper extremity   • Medicare annual wellness visit, subsequent       Advanced Care Planning:  ACP discussion was held with the patient during this visit. Patient does not have an advance directive, information provided.    Review of Systems   Constitutional: Positive for fatigue. Negative for activity change, appetite change, chills, diaphoresis, fever and unexpected weight change.   HENT: Positive for hearing loss. Negative for congestion, ear pain, facial swelling, nosebleeds, postnasal drip, rhinorrhea, sinus pressure, sinus pain, sneezing, sore throat, tinnitus and trouble swallowing.    Eyes: Positive for visual disturbance. Negative for photophobia, pain, discharge, redness and itching.   Respiratory: Positive for shortness of breath. Negative for apnea, cough and wheezing.    Cardiovascular: Negative for chest pain, palpitations and leg swelling.   Gastrointestinal: Negative for abdominal distention, abdominal pain, blood in stool, constipation, diarrhea, nausea, rectal pain and vomiting.   Endocrine: Negative for cold intolerance, heat intolerance, polydipsia, polyphagia and polyuria.   Genitourinary: Positive for frequency. Negative for decreased urine volume, difficulty urinating, dysuria, enuresis, flank pain, genital sores, hematuria, penile pain, penile swelling, scrotal swelling, testicular pain and urgency.   Musculoskeletal: Positive for arthralgias, back pain, gait problem, myalgias, neck pain and neck stiffness.   Skin: Negative for rash.   Allergic/Immunologic: Negative for environmental allergies and food allergies.   Neurological: Positive for weakness. Negative for dizziness, tremors,  "seizures, syncope, facial asymmetry, speech difficulty, light-headedness, numbness and headaches.   Hematological: Negative for adenopathy. Does not bruise/bleed easily.   Psychiatric/Behavioral: Positive for confusion. Negative for agitation, dysphoric mood, sleep disturbance and suicidal ideas. The patient is nervous/anxious. The patient is not hyperactive.        Compared to one year ago, the patient feels his physical health is the same.  Compared to one year ago, the patient feels his mental health is the same.    Reviewed chart for potential of high risk medication in the elderly: yes  Reviewed chart for potential of harmful drug interactions in the elderly:yes    Objective         Vitals:    02/27/20 1037 02/27/20 1100   BP: 161/91 135/84   BP Location: Left arm    Patient Position: Sitting    Cuff Size: Adult    Pulse: 74    Resp: 16    Temp: 97.8 °F (36.6 °C)    TempSrc: Oral    SpO2: 99%    Weight: 85.7 kg (189 lb)    Height: 172.7 cm (68\")    PainSc:   8    PainLoc: Groin        Body mass index is 28.74 kg/m².  Discussed the patient's BMI with him. The BMI is below average; BMI management plan is completed.    Physical Exam   Constitutional: He is oriented to person, place, and time.   Elderly male no distress using assistive device for ambulation due to pain and weakness   HENT:   Head: Normocephalic and atraumatic.   Eyes: Pupils are equal, round, and reactive to light. Conjunctivae and EOM are normal. Right eye exhibits no discharge. Left eye exhibits no discharge. No scleral icterus.   Neck: No thyromegaly present.   Cardiovascular: Normal rate, regular rhythm and normal heart sounds. Exam reveals no gallop and no friction rub.   No murmur heard.  Pulmonary/Chest: Effort normal and breath sounds normal. No respiratory distress. He has no wheezes. He has no rales.   Abdominal: Soft. Bowel sounds are normal. He exhibits no distension and no mass. There is no tenderness. There is no rebound and no " guarding.   Musculoskeletal: He exhibits no edema, tenderness or deformity.   Lymphadenopathy:     He has no cervical adenopathy.   Neurological: He is alert and oriented to person, place, and time. He displays normal reflexes. No cranial nerve deficit or sensory deficit. He exhibits normal muscle tone. Coordination normal.   Skin: Skin is warm and dry. No rash noted. No erythema.   Psychiatric: He has a normal mood and affect. His behavior is normal. Judgment and thought content normal.       Lab Results   Component Value Date     (H) 02/27/2020    CHLPL 139 02/27/2020    TRIG 75 02/27/2020    HDL 60 02/27/2020    LDL 64 02/27/2020    VLDL 15 02/27/2020    HGBA1C 7.1 (H) 02/27/2020        Assessment/Plan   Medicare Risks and Personalized Health Plan  CMS Preventative Services Quick Reference  Advance Directive Discussion  Dementia/Memory   Fall Risk    The above risks/problems have been discussed with the patient.  Pertinent information has been shared with the patient in the After Visit Summary.  Follow up plans and orders are seen below in the Assessment/Plan Section.    Diagnoses and all orders for this visit:    1. Medicare annual wellness visit, subsequent (Primary)  Assessment & Plan:  Discussed injury prevention, diet and exercise, safe sexual practices, and screening for common diseases. Encouraged use of sunscreen and seatbelts. Discussed timing of colon cancer cancer screening, prostate cancer screening, and review of skin for lesions. Avoidance of tobacco encouraged. Limitation or avoidance of alcohol encouraged. Recommend yearly dental and eye exams. Also discussed monitoring of blood pressure and lipids.        2. Bilateral carotid artery stenosis  Assessment & Plan:  Ultrasound reviewed from 2018 showing a right interna carotid at 16 to 49% stenosis with left at 50 to 75% stenosis.  Continue yearly vascular surgery follow-up.        3. Chronic coronary artery disease  Assessment &  Plan:  Currently stable with no chest pain.  No change in minimal activity.  Report any chest pain.      4. Essential hypertension  Assessment & Plan:  Stable.  Continue current medications.    Orders:  -     Comprehensive Metabolic Panel  -     CBC & Differential    5. Mixed hyperlipidemia  Assessment & Plan:  Aggressive modification encouraged.  Recheck blood work and call with results.    Orders:  -     Lipid Panel    6. Type 2 diabetes mellitus with hyperglycemia, without long-term current use of insulin (CMS/[facility])  Assessment & Plan:  Uncontrolled.  Continue medications and try to watch diet a little bit better.  We will recheck in 3 months and if no better may need to consider insulin.    Orders:  -     Hemoglobin A1c    7. Asymptomatic microscopic hematuria  -     POC Urinalysis Dipstick, Automated    8. Neuropathic pain    9. Chronic pain syndrome  Assessment & Plan:  Contributing factor in almost everything we do.  Patient is unwilling to wean off of pain medications despite cognitive effects.  Still complains of severe pain in multiple sites.  He will continue pain management follow-up.      10. Primary osteoarthritis of left shoulder    11. Chronic kidney disease, stage III (moderate) (CMS/[facility])  Assessment & Plan:  Possibly related to diabetes.  Continue avoiding NSAIDs.  Repeat CMP to watch renal function.      12. Enlarged prostate without lower urinary tract symptoms (luts)  Assessment & Plan:  Patient is having some nocturia that is increased.  Avoid fluids in the evening.  I am hesitant to start alpha blockers due to cognitive issues.      13. Transient alteration of awareness  Assessment & Plan:  Patient does not see this is a problem but his spouse does.  She relayed this to us by phone.  We will look at blood work but I think if he is going to continue with morphine and gabapentin that determining etiology will be extremely difficult.  They will report any worsening.      14. Sleep disorder    15.  Unsteady gait    16. Screening PSA (prostate specific antigen)  -     PSA Screen    17. Adenomatous polyp of colon, unspecified part of colon    18. Osteoarthritis of cervical 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    Other orders  -     PSA Screen    Follow Up:  No follow-ups on file.     An After Visit Summary and PPPS were given to the patient.

## 2020-02-28 LAB
ALBUMIN SERPL-MCNC: 4.6 G/DL (ref 3.8–4.8)
ALBUMIN/GLOB SERPL: 1.6 {RATIO} (ref 1.2–2.2)
ALP SERPL-CCNC: 112 IU/L (ref 39–117)
ALT SERPL-CCNC: 19 IU/L (ref 0–44)
AST SERPL-CCNC: 18 IU/L (ref 0–40)
BASOPHILS # BLD AUTO: 0 X10E3/UL (ref 0–0.2)
BASOPHILS NFR BLD AUTO: 0 %
BILIRUB SERPL-MCNC: 0.5 MG/DL (ref 0–1.2)
BUN SERPL-MCNC: 13 MG/DL (ref 8–27)
BUN/CREAT SERPL: 11 (ref 10–24)
CALCIUM SERPL-MCNC: 9.6 MG/DL (ref 8.6–10.2)
CHLORIDE SERPL-SCNC: 101 MMOL/L (ref 96–106)
CHOLEST SERPL-MCNC: 139 MG/DL (ref 100–199)
CO2 SERPL-SCNC: 23 MMOL/L (ref 20–29)
CREAT SERPL-MCNC: 1.14 MG/DL (ref 0.76–1.27)
EOSINOPHIL # BLD AUTO: 0.1 X10E3/UL (ref 0–0.4)
EOSINOPHIL NFR BLD AUTO: 1 %
ERYTHROCYTE [DISTWIDTH] IN BLOOD BY AUTOMATED COUNT: 13.7 % (ref 11.6–15.4)
GLOBULIN SER CALC-MCNC: 2.8 G/DL (ref 1.5–4.5)
GLUCOSE SERPL-MCNC: 221 MG/DL (ref 65–99)
HBA1C MFR BLD: 7.1 % (ref 4.8–5.6)
HCT VFR BLD AUTO: 38.9 % (ref 37.5–51)
HDLC SERPL-MCNC: 60 MG/DL
HGB BLD-MCNC: 13.3 G/DL (ref 13–17.7)
IMM GRANULOCYTES # BLD AUTO: 0 X10E3/UL (ref 0–0.1)
IMM GRANULOCYTES NFR BLD AUTO: 0 %
LDLC SERPL CALC-MCNC: 64 MG/DL (ref 0–99)
LYMPHOCYTES # BLD AUTO: 1.2 X10E3/UL (ref 0.7–3.1)
LYMPHOCYTES NFR BLD AUTO: 19 %
MCH RBC QN AUTO: 28.7 PG (ref 26.6–33)
MCHC RBC AUTO-ENTMCNC: 34.2 G/DL (ref 31.5–35.7)
MCV RBC AUTO: 84 FL (ref 79–97)
MONOCYTES # BLD AUTO: 0.5 X10E3/UL (ref 0.1–0.9)
MONOCYTES NFR BLD AUTO: 8 %
NEUTROPHILS # BLD AUTO: 4.4 X10E3/UL (ref 1.4–7)
NEUTROPHILS NFR BLD AUTO: 72 %
PLATELET # BLD AUTO: 273 X10E3/UL (ref 150–450)
POTASSIUM SERPL-SCNC: 4.5 MMOL/L (ref 3.5–5.2)
PROT SERPL-MCNC: 7.4 G/DL (ref 6–8.5)
PSA SERPL-MCNC: 0.6 NG/ML (ref 0–4)
RBC # BLD AUTO: 4.64 X10E6/UL (ref 4.14–5.8)
SODIUM SERPL-SCNC: 142 MMOL/L (ref 134–144)
TRIGL SERPL-MCNC: 75 MG/DL (ref 0–149)
VLDLC SERPL CALC-MCNC: 15 MG/DL (ref 5–40)
WBC # BLD AUTO: 6.2 X10E3/UL (ref 3.4–10.8)

## 2020-02-29 NOTE — ASSESSMENT & PLAN NOTE
Contributing factor in almost everything we do.  Patient is unwilling to wean off of pain medications despite cognitive effects.  Still complains of severe pain in multiple sites.  He will continue pain management follow-up.

## 2020-02-29 NOTE — ASSESSMENT & PLAN NOTE
Patient does not see this is a problem but his spouse does.  She relayed this to us by phone.  We will look at blood work but I think if he is going to continue with morphine and gabapentin that determining etiology will be extremely difficult.  They will report any worsening.

## 2020-02-29 NOTE — ASSESSMENT & PLAN NOTE
Ultrasound reviewed from 2018 showing a right interna carotid at 16 to 49% stenosis with left at 50 to 75% stenosis.  Continue yearly vascular surgery follow-up.

## 2020-02-29 NOTE — ASSESSMENT & PLAN NOTE
Uncontrolled.  Continue medications and try to watch diet a little bit better.  We will recheck in 3 months and if no better may need to consider insulin.

## 2020-02-29 NOTE — ASSESSMENT & PLAN NOTE
Patient is having some nocturia that is increased.  Avoid fluids in the evening.  I am hesitant to start alpha blockers due to cognitive issues.

## 2020-03-05 RX ORDER — ATENOLOL 50 MG/1
TABLET ORAL
Qty: 30 TABLET | Refills: 0 | Status: SHIPPED | OUTPATIENT
Start: 2020-03-05 | End: 2020-04-02

## 2020-03-05 RX ORDER — CYANOCOBALAMIN 1000 UG/ML
INJECTION, SOLUTION INTRAMUSCULAR; SUBCUTANEOUS
Qty: 1 ML | Refills: 0 | Status: SHIPPED | OUTPATIENT
Start: 2020-03-05 | End: 2020-04-02

## 2020-03-10 ENCOUNTER — OFFICE VISIT (OUTPATIENT)
Dept: PAIN MEDICINE | Facility: CLINIC | Age: 70
End: 2020-03-10

## 2020-03-10 VITALS
HEIGHT: 68 IN | WEIGHT: 178 LBS | BODY MASS INDEX: 26.98 KG/M2 | DIASTOLIC BLOOD PRESSURE: 80 MMHG | OXYGEN SATURATION: 98 % | SYSTOLIC BLOOD PRESSURE: 149 MMHG | HEART RATE: 64 BPM | RESPIRATION RATE: 16 BRPM | TEMPERATURE: 98.2 F

## 2020-03-10 DIAGNOSIS — Z79.899 HIGH RISK MEDICATION USE: ICD-10-CM

## 2020-03-10 DIAGNOSIS — G89.4 CHRONIC PAIN SYNDROME: Primary | ICD-10-CM

## 2020-03-10 DIAGNOSIS — M47.812 CERVICAL SPONDYLOSIS WITHOUT MYELOPATHY: ICD-10-CM

## 2020-03-10 DIAGNOSIS — M79.2 NEUROPATHIC PAIN: ICD-10-CM

## 2020-03-10 DIAGNOSIS — M25.50 POLYARTHRALGIA: ICD-10-CM

## 2020-03-10 PROCEDURE — 99214 OFFICE O/P EST MOD 30 MIN: CPT | Performed by: ANESTHESIOLOGY

## 2020-03-10 PROCEDURE — G0463 HOSPITAL OUTPT CLINIC VISIT: HCPCS | Performed by: ANESTHESIOLOGY

## 2020-03-10 RX ORDER — MORPHINE SULFATE 15 MG/1
15 TABLET, FILM COATED, EXTENDED RELEASE ORAL 3 TIMES DAILY PRN
Qty: 90 TABLET | Refills: 0 | Status: SHIPPED | OUTPATIENT
Start: 2020-03-10 | End: 2020-03-10 | Stop reason: SDUPTHER

## 2020-03-10 RX ORDER — MORPHINE SULFATE 15 MG/1
15 TABLET, FILM COATED, EXTENDED RELEASE ORAL 3 TIMES DAILY PRN
Qty: 90 TABLET | Refills: 0 | Status: SHIPPED | OUTPATIENT
Start: 2020-03-10 | End: 2020-05-14 | Stop reason: SDUPTHER
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
## 2020-03-25 ENCOUNTER — TELEPHONE (OUTPATIENT)
Dept: PAIN MEDICINE | Facility: HOSPITAL | Age: 70
End: 2020-03-25

## 2020-03-25 NOTE — TELEPHONE ENCOUNTER
Patient had surgery for kidney stones yesterday and the surgeon gave him a prescription for Percocet. He is wanting permission to get this prescription filled.

## 2020-04-02 RX ORDER — GLIPIZIDE 5 MG/1
TABLET ORAL
Qty: 30 TABLET | Refills: 0 | Status: SHIPPED | OUTPATIENT
Start: 2020-04-02 | End: 2020-05-06

## 2020-04-02 RX ORDER — ATENOLOL 50 MG/1
TABLET ORAL
Qty: 30 TABLET | Refills: 0 | Status: SHIPPED | OUTPATIENT
Start: 2020-04-02 | End: 2020-05-06

## 2020-04-02 RX ORDER — CYANOCOBALAMIN 1000 UG/ML
INJECTION, SOLUTION INTRAMUSCULAR; SUBCUTANEOUS
Qty: 1 ML | Refills: 0 | Status: SHIPPED | OUTPATIENT
Start: 2020-04-02 | End: 2020-05-06

## 2020-04-29 ENCOUNTER — OFFICE VISIT (OUTPATIENT)
Dept: CARDIOLOGY | Facility: CLINIC | Age: 70
End: 2020-04-29

## 2020-04-29 VITALS — BODY MASS INDEX: 26.98 KG/M2 | HEIGHT: 68 IN | WEIGHT: 178 LBS

## 2020-04-29 DIAGNOSIS — I65.23 BILATERAL CAROTID ARTERY STENOSIS: ICD-10-CM

## 2020-04-29 DIAGNOSIS — E78.2 MIXED HYPERLIPIDEMIA: ICD-10-CM

## 2020-04-29 DIAGNOSIS — I25.10 CHRONIC CORONARY ARTERY DISEASE: ICD-10-CM

## 2020-04-29 DIAGNOSIS — I10 ESSENTIAL HYPERTENSION: Primary | ICD-10-CM

## 2020-04-29 PROCEDURE — 99443 PR PHYS/QHP TELEPHONE EVALUATION 21-30 MIN: CPT | Performed by: INTERNAL MEDICINE

## 2020-04-29 RX ORDER — ALFUZOSIN HYDROCHLORIDE 10 MG/1
TABLET, EXTENDED RELEASE ORAL
COMMUNITY
Start: 2020-04-10 | End: 2021-03-10 | Stop reason: ALTCHOICE

## 2020-04-29 NOTE — PROGRESS NOTES
"Cardiology Telephone Visit    Encounter Date:  04/29/2020    Patient ID:   Merlin Morales is a 70 y.o. male.    Reason For Followup:  Peripheral artery disease  Carotid stenosis  Hypertension  Hyperlipidemia    Brief Clinical History:  Dear Dr. Peng, Reggie Duane, MD    I had the pleasure of seeing Merlin Trujillo today. As you are well aware, this is a 70 y.o. male with past medical history that is significant for history of hypertension hyperlipidemia  and peripheral arterial disease here for follow-up    Patient had symptoms of TIA with amaurosis fugax in the right eye.  Noted to have significant carotid stenosis in the right carotid artery  Status post a successful right carotid endarterectomy  Patient had recent hospitalization with urosepsis  Echocardiogram showed normal LV systolic function        Interval History:  He denies any cardiac symptoms at this time  he denies any active symptoms of chest pain shortness of breath dizziness or syncope  Good functional status    Assessment & Plan    Impressions:     Hypertension   hyperlipidemia   peripheral arterial disease   presumed coronary artery disease but no evidence of any inducible ischemia on the stress test    Recommendations:  Patient denies any cardiac symptoms of chest pain shortness of breath dizziness or syncope   continued aggressive risk factor modification   regular exercise   labs with primary care physician office  Labs discussed with the patient   follow up in office in 6 months    Telephone visit  You have chosen to receive care through a telephone visit. Do you consent to use a telephone visit for your medical care today? Yes    Vitals:  Vitals:    04/29/20 1300   Weight: 80.7 kg (178 lb)   Height: 172.7 cm (68\")       Allergies:  No Known Allergies    Medication Review:     Current Outpatient Medications:   •  ACCU-CHEK FASTCLIX LANCETS misc, TEST EVERY DAY AS DIRECTED, Disp: 100 each, Rfl: 5  •  ACCU-CHEK GUIDE test strip, TEST ONCE " "DAILY AS DIRECTED, Disp: 100 each, Rfl: 5  •  alfuzosin (UROXATRAL) 10 MG 24 hr tablet, , Disp: , Rfl:   •  AMITIZA 24 MCG capsule, Take 1 capsule by mouth 2 (Two) Times a Day., Disp: 60 capsule, Rfl: 11  •  amLODIPine (NORVASC) 5 MG tablet, TAKE ONE TABLET BY MOUTH TWICE DAILY, Disp: 180 tablet, Rfl: 3  •  aspirin 81 MG EC tablet, Take 81 mg by mouth Daily., Disp: , Rfl:   •  atenolol (TENORMIN) 50 MG tablet, TAKE 1/2 TABLET BY MOUTH TWICE DAILY, Disp: 30 tablet, Rfl: 0  •  atorvastatin (LIPITOR) 40 MG tablet, T1T PO QHS, Disp: 30 tablet, Rfl: 5  •  B-D 3CC LUER-RONY SYR 25GX1\" 25G X 1\" 3 ML misc, USE AS DIRECTED WITH B-12 INJECTION, Disp: 100 each, Rfl: 5  •  baclofen (LIORESAL) 10 MG tablet, TAKE ONE TABLET BY MOUTH THREE TIMES DAILY, Disp: 90 tablet, Rfl: 4  •  cyanocobalamin 1000 MCG/ML injection, INJECT 1ML MONTHLY, Disp: 1 mL, Rfl: 0  •  Gabapentin Enacarbil ER (HORIZANT) 600 MG tablet controlled-release, Take 600 mg by mouth 3 (Three) Times a Day., Disp: 60 tablet, Rfl: 11  •  glipizide (GLUCOTROL) 5 MG tablet, TAKE ONE TABLET BY MOUTH EVERY DAY, Disp: 30 tablet, Rfl: 0  •  Morphine (MS CONTIN) 15 MG 12 hr tablet, Take 1 tablet by mouth 3 (Three) Times a Day As Needed for Severe Pain ., Disp: 90 tablet, Rfl: 0    Family History:  Family History   Problem Relation Age of Onset   • Dementia Mother    • Heart disease Mother    • COPD Father    • Stroke Father    • Heart disease Father    • Hypertension Sister    • Diabetes Sister    • Hypertension Brother        Past Medical History:  Past Medical History:   Diagnosis Date   • Adenomatous polyps    • Amaurosis fugax 3/27/2017   • Arm pain     left into hand   • Blood in urine     3/2020   • BPH (benign prostatic hyperplasia)    • CAD (coronary artery disease)    • CKD (chronic kidney disease) stage 3, GFR 30-59 ml/min (CMS/HCC)    • CVA, old, speech/language deficit    • Diabetes (CMS/HCC)    • Diabetic acetonemia (CMS/HCC)    • Hearing loss    • Hematuria    • " Hernia, inguinal    • Hyperlipidemia    • Hypertension    • Kidney stones    • Neuropathic pain    • Obstructive uropathy    • Shoulder pain    • Sleep disorder    • Spondylosis, cervical        Past surgical History:  Past Surgical History:   Procedure Laterality Date   • ANKLE SURGERY      replacement  rt   • CAROTID ARTERY ANGIOPLASTY     • CARPAL TUNNEL RELEASE     • COLONOSCOPY     • CYSTOSCOPY URETEROSCOPY LASER LITHOTRIPSY      kidney stones   • EYE SURGERY      mesh  and plate under rt eye due to orbital fx   • NOSE SURGERY      x2   • TEETH EXTRACTION      dentures       Social History:  Social History     Socioeconomic History   • Marital status:      Spouse name: Not on file   • Number of children: Not on file   • Years of education: Not on file   • Highest education level: Not on file   Tobacco Use   • Smoking status: Never Smoker   • Smokeless tobacco: Never Used   Substance and Sexual Activity   • Alcohol use: No     Frequency: Never   • Drug use: Never   • Sexual activity: Defer       Review of Systems:  The following systems were reviewed as they relate to the cardiovascular system: Constitutional, Eyes, ENT, Cardiovascular, Respiratory, Gastrointestinal, Integumentary, Neurological, Psychiatric, Hematologic, Endocrine, Musculoskeletal, and Genitourinary. The pertinent cardiovascular findings are reported above with all other cardiovascular points within those systems being negative.    Diagnostic Study Review:     Current Electrocardiogram:  Procedures      NOTE: The following portions of the patient's history were reviewed and updated this visit as appropriate: allergies, current medications, past family history, past medical history, past social history, past surgical history and problem list.    A total of 25 minutes of medical discussion occurred during this encounter.      Novel Coronavirus (COVID-19) Disclaimer:     A proclamation declaring a national emergency concerning the Novel  Coronavirus Disease (COVID-19) Outbreak was issued on March 13, 2020 at the direction of the .    This virtual visit was performed with the patient's consent in lieu of the patient's regularly scheduled appointment in order to provide the patient with the opportunity to maintain contact with their healthcare provider while also adhering to social distancing guidelines set forth by the CDC to reduce exposure to the Novel Coronavirus (COVID-19).  Any vital signs obtained during this visit were provided by the patient.

## 2020-05-06 RX ORDER — PENTOXIFYLLINE 400 MG/1
TABLET, EXTENDED RELEASE ORAL
Qty: 90 TABLET | Refills: 3 | Status: SHIPPED | OUTPATIENT
Start: 2020-05-06 | End: 2020-10-02 | Stop reason: SDUPTHER

## 2020-05-06 RX ORDER — GLIPIZIDE 5 MG/1
TABLET ORAL
Qty: 30 TABLET | Refills: 0 | Status: SHIPPED | OUTPATIENT
Start: 2020-05-06 | End: 2020-05-30

## 2020-05-06 RX ORDER — ATENOLOL 50 MG/1
TABLET ORAL
Qty: 30 TABLET | Refills: 0 | Status: SHIPPED | OUTPATIENT
Start: 2020-05-06 | End: 2020-05-30

## 2020-05-06 RX ORDER — CYANOCOBALAMIN 1000 UG/ML
INJECTION, SOLUTION INTRAMUSCULAR; SUBCUTANEOUS
Qty: 1 ML | Refills: 0 | Status: SHIPPED | OUTPATIENT
Start: 2020-05-06 | End: 2020-05-30

## 2020-05-14 ENCOUNTER — OFFICE VISIT (OUTPATIENT)
Dept: PAIN MEDICINE | Facility: CLINIC | Age: 70
End: 2020-05-14

## 2020-05-14 VITALS — HEIGHT: 68 IN | BODY MASS INDEX: 27.28 KG/M2 | WEIGHT: 180 LBS

## 2020-05-14 DIAGNOSIS — M25.50 POLYARTHRALGIA: ICD-10-CM

## 2020-05-14 DIAGNOSIS — Z79.899 HIGH RISK MEDICATION USE: ICD-10-CM

## 2020-05-14 DIAGNOSIS — M47.812 CERVICAL SPONDYLOSIS WITHOUT MYELOPATHY: ICD-10-CM

## 2020-05-14 DIAGNOSIS — M79.2 NEUROPATHIC PAIN: ICD-10-CM

## 2020-05-14 DIAGNOSIS — G89.4 CHRONIC PAIN SYNDROME: Primary | ICD-10-CM

## 2020-05-14 PROCEDURE — 99214 OFFICE O/P EST MOD 30 MIN: CPT | Performed by: ANESTHESIOLOGY

## 2020-05-14 RX ORDER — MORPHINE SULFATE 15 MG/1
15 TABLET, FILM COATED, EXTENDED RELEASE ORAL 3 TIMES DAILY PRN
Qty: 90 TABLET | Refills: 0 | Status: SHIPPED | OUTPATIENT
Start: 2020-05-14 | End: 2020-07-13 | Stop reason: SDUPTHER

## 2020-05-14 RX ORDER — MORPHINE SULFATE 15 MG/1
15 TABLET, FILM COATED, EXTENDED RELEASE ORAL 3 TIMES DAILY PRN
Qty: 90 TABLET | Refills: 0 | Status: SHIPPED | OUTPATIENT
Start: 2020-06-13 | End: 2020-07-13 | Stop reason: SDUPTHER
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
## 2020-05-30 RX ORDER — ATENOLOL 50 MG/1
TABLET ORAL
Qty: 30 TABLET | Refills: 0 | Status: SHIPPED | OUTPATIENT
Start: 2020-05-30 | End: 2020-06-28

## 2020-05-30 RX ORDER — BACLOFEN 10 MG/1
TABLET ORAL
Qty: 90 TABLET | Refills: 2 | Status: SHIPPED | OUTPATIENT
Start: 2020-05-30 | End: 2020-10-02 | Stop reason: SDUPTHER

## 2020-05-30 RX ORDER — CYANOCOBALAMIN 1000 UG/ML
INJECTION, SOLUTION INTRAMUSCULAR; SUBCUTANEOUS
Qty: 1 ML | Refills: 0 | Status: SHIPPED | OUTPATIENT
Start: 2020-05-30 | End: 2020-06-28

## 2020-05-30 RX ORDER — GLIPIZIDE 5 MG/1
TABLET ORAL
Qty: 30 TABLET | Refills: 0 | Status: SHIPPED | OUTPATIENT
Start: 2020-05-30 | End: 2020-06-28

## 2020-06-07 NOTE — ASSESSMENT & PLAN NOTE
Doing well. Symptoms associated with diabetes were reviewed and patient has had none. Review of complications of diabetes reveals peripheral neuropathy. Encouraged routine blood sugar monitoring. Discussed need for aspirin, statin, and ACE-inhibitor/ARB therapy. Recommended daily foot exams, quaterly Hemoglobin A1c evaluations, yearly flu vaccine, yearly dilated fundoscopic exams, and dietary modifications.   
Encouraged low-fat, low-cholesterol diet. Discussed timing of lipid monitoring, need for liver monitoring while on meds. Risks of lack of control discussed.   
No angina or decreased exercise tolerance. Tolerating medications without adverse effects. Continue medications and lifestyle modificaitons.    
Report any headache, dizziness, chest pain, syncope, or other concerns.   
13

## 2020-06-28 RX ORDER — CYANOCOBALAMIN 1000 UG/ML
INJECTION, SOLUTION INTRAMUSCULAR; SUBCUTANEOUS
Qty: 1 ML | Refills: 0 | Status: SHIPPED | OUTPATIENT
Start: 2020-06-28 | End: 2020-08-06

## 2020-06-28 RX ORDER — GLIPIZIDE 5 MG/1
TABLET ORAL
Qty: 30 TABLET | Refills: 0 | Status: SHIPPED | OUTPATIENT
Start: 2020-06-28 | End: 2020-08-06

## 2020-06-28 RX ORDER — ATENOLOL 50 MG/1
TABLET ORAL
Qty: 30 TABLET | Refills: 0 | Status: SHIPPED | OUTPATIENT
Start: 2020-06-28 | End: 2020-08-06

## 2020-07-13 ENCOUNTER — TELEPHONE (OUTPATIENT)
Dept: PAIN MEDICINE | Facility: HOSPITAL | Age: 70
End: 2020-07-13

## 2020-07-13 ENCOUNTER — OFFICE VISIT (OUTPATIENT)
Dept: PAIN MEDICINE | Facility: CLINIC | Age: 70
End: 2020-07-13

## 2020-07-13 VITALS — BODY MASS INDEX: 27.28 KG/M2 | WEIGHT: 180 LBS | HEIGHT: 68 IN

## 2020-07-13 DIAGNOSIS — Z79.899 HIGH RISK MEDICATION USE: ICD-10-CM

## 2020-07-13 DIAGNOSIS — G89.4 CHRONIC PAIN SYNDROME: Primary | ICD-10-CM

## 2020-07-13 DIAGNOSIS — M79.2 NEUROPATHIC PAIN: ICD-10-CM

## 2020-07-13 DIAGNOSIS — M47.812 CERVICAL SPONDYLOSIS WITHOUT MYELOPATHY: ICD-10-CM

## 2020-07-13 DIAGNOSIS — M25.50 POLYARTHRALGIA: ICD-10-CM

## 2020-07-13 PROCEDURE — 99443 PR PHYS/QHP TELEPHONE EVALUATION 21-30 MIN: CPT | Performed by: ANESTHESIOLOGY

## 2020-07-13 RX ORDER — MORPHINE SULFATE 15 MG/1
15 TABLET, FILM COATED, EXTENDED RELEASE ORAL 3 TIMES DAILY PRN
Qty: 90 TABLET | Refills: 0 | Status: SHIPPED | OUTPATIENT
Start: 2020-07-13 | End: 2020-09-10 | Stop reason: SDUPTHER

## 2020-07-13 RX ORDER — MORPHINE SULFATE 15 MG/1
15 TABLET, FILM COATED, EXTENDED RELEASE ORAL 3 TIMES DAILY PRN
Qty: 90 TABLET | Refills: 0 | Status: SHIPPED | OUTPATIENT
Start: 2020-08-12 | End: 2020-09-10 | Stop reason: SDUPTHER
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
## 2020-07-14 ENCOUNTER — TELEPHONE (OUTPATIENT)
Dept: PAIN MEDICINE | Facility: HOSPITAL | Age: 70
End: 2020-07-14

## 2020-07-14 RX ORDER — GABAPENTIN ENACARBIL 600 MG/1
TABLET, EXTENDED RELEASE ORAL
Qty: 90 TABLET | Refills: 4 | OUTPATIENT
Start: 2020-07-14

## 2020-07-20 ENCOUNTER — LAB (OUTPATIENT)
Dept: LAB | Facility: HOSPITAL | Age: 70
End: 2020-07-20

## 2020-07-29 ENCOUNTER — TELEPHONE (OUTPATIENT)
Dept: FAMILY MEDICINE CLINIC | Facility: CLINIC | Age: 70
End: 2020-07-29

## 2020-07-29 DIAGNOSIS — E11.65 TYPE 2 DIABETES MELLITUS WITH HYPERGLYCEMIA, WITHOUT LONG-TERM CURRENT USE OF INSULIN (HCC): Primary | ICD-10-CM

## 2020-08-05 LAB — HBA1C MFR BLD: 6.7 % (ref 4.8–5.6)

## 2020-08-06 RX ORDER — GLIPIZIDE 5 MG/1
TABLET ORAL
Qty: 30 TABLET | Refills: 0 | Status: SHIPPED | OUTPATIENT
Start: 2020-08-06 | End: 2020-10-02 | Stop reason: SDUPTHER

## 2020-08-06 RX ORDER — CYANOCOBALAMIN 1000 UG/ML
INJECTION, SOLUTION INTRAMUSCULAR; SUBCUTANEOUS
Qty: 1 ML | Refills: 0 | Status: SHIPPED | OUTPATIENT
Start: 2020-08-06 | End: 2020-10-02 | Stop reason: SDUPTHER

## 2020-08-06 RX ORDER — ATENOLOL 50 MG/1
TABLET ORAL
Qty: 30 TABLET | Refills: 0 | Status: SHIPPED | OUTPATIENT
Start: 2020-08-06 | End: 2020-10-02 | Stop reason: SDUPTHER

## 2020-08-31 ENCOUNTER — OFFICE VISIT (OUTPATIENT)
Dept: FAMILY MEDICINE CLINIC | Facility: CLINIC | Age: 70
End: 2020-08-31

## 2020-08-31 VITALS
SYSTOLIC BLOOD PRESSURE: 132 MMHG | RESPIRATION RATE: 16 BRPM | OXYGEN SATURATION: 98 % | BODY MASS INDEX: 28.04 KG/M2 | WEIGHT: 185 LBS | TEMPERATURE: 98 F | HEIGHT: 68 IN | DIASTOLIC BLOOD PRESSURE: 80 MMHG | HEART RATE: 71 BPM

## 2020-08-31 DIAGNOSIS — E11.65 TYPE 2 DIABETES MELLITUS WITH HYPERGLYCEMIA, WITHOUT LONG-TERM CURRENT USE OF INSULIN (HCC): Primary | ICD-10-CM

## 2020-08-31 DIAGNOSIS — I10 ESSENTIAL HYPERTENSION: ICD-10-CM

## 2020-08-31 DIAGNOSIS — I25.10 CHRONIC CORONARY ARTERY DISEASE: ICD-10-CM

## 2020-08-31 PROBLEM — H34.219 CHOLESTEROL RETINAL EMBOLUS: Status: ACTIVE | Noted: 2017-03-24

## 2020-08-31 PROBLEM — H34.219 PARTIAL RETINAL ARTERY OCCLUSION: Status: ACTIVE | Noted: 2017-03-24

## 2020-08-31 PROCEDURE — 99214 OFFICE O/P EST MOD 30 MIN: CPT | Performed by: FAMILY MEDICINE

## 2020-08-31 NOTE — ASSESSMENT & PLAN NOTE
Coronary artery disease is unchanged.  Continue current treatment regimen.  Weight loss.  Regular aerobic exercise.  Continue current medications.  Cardiac status will be reassessed in 6 months.

## 2020-08-31 NOTE — PROGRESS NOTES
Patient presents for follow-up on stable chronic medical problems including diabetes, hypertension, hyperlipidemia, coronary artery disease and arthritis. Patient denies chest pain, shortness of breath and abdominal pain. Patient complains of chronic pain. Patient is here for monitoring of chronic issues due to need for monitoring of renal function, liver function, side effects and interactions    Past Medical History:   Diagnosis Date   • Adenomatous polyps    • Amaurosis fugax 3/27/2017   • Arm pain    • Blood in urine    • BPH (benign prostatic hyperplasia)    • CAD (coronary artery disease)    • CKD (chronic kidney disease) stage 3, GFR 30-59 ml/min (CMS/[facility])    • CVA, old, speech/language deficit    • Diabetes (CMS/[facility])    • Diabetic acetonemia (CMS/[facility])    • Hearing loss    • Hematuria    • Hernia, inguinal    • Hyperlipidemia    • Hypertension    • Kidney stones    • Neuropathic pain    • Obstructive uropathy    • Shoulder pain    • Sleep disorder    • Spondylosis, cervical      Past Surgical History:   Procedure Laterality Date   • ANKLE SURGERY      replacement  rt   • CAROTID ARTERY ANGIOPLASTY     • CARPAL TUNNEL RELEASE     • COLONOSCOPY     • CYSTOSCOPY URETEROSCOPY LASER LITHOTRIPSY      kidney stones   • EYE SURGERY      mesh  and plate under rt eye due to orbital fx   • NOSE SURGERY      x2   • TEETH EXTRACTION      dentures     Family History   Problem Relation Age of Onset   • Dementia Mother    • Heart disease Mother    • COPD Father    • Stroke Father    • Heart disease Father    • Hypertension Sister    • Diabetes Sister    • Hypertension Brother      Social History     Tobacco Use   • Smoking status: Never Smoker   • Smokeless tobacco: Never Used   Substance Use Topics   • Alcohol use: No     Frequency: Never       PHQ-2 Depression Screening     PHQ-9 Depression Screening       Review of Systems   Constitutional: Negative for chills and fatigue.   Respiratory: Negative for cough and shortness  "of breath.    Cardiovascular: Negative for chest pain and palpitations.   Musculoskeletal: Positive for arthralgias, back pain and myalgias.   Skin: Negative for rash.   Neurological: Negative for dizziness and headache.     Vitals:    08/31/20 1016   BP: 132/80   BP Location: Right arm   Patient Position: Sitting   Cuff Size: Adult   Pulse: 71   Resp: 16   Temp: 98 °F (36.7 °C)   TempSrc: Infrared   SpO2: 98%   Weight: 83.9 kg (185 lb)   Height: 172.7 cm (68\")     Body mass index is 28.13 kg/m².  Physical Exam   Constitutional: He is oriented to person, place, and time. He appears well-developed and well-nourished. No distress.   Cardiovascular: Normal rate, regular rhythm and normal heart sounds.   No murmur heard.  Pulmonary/Chest: Effort normal and breath sounds normal. He has no wheezes. He has no rales.   Abdominal: Soft. Bowel sounds are normal. He exhibits no distension.   Musculoskeletal: Normal range of motion.      During the foot exam he had a monofilament test performed.    Neurological Sensory Findings - Unaltered hot/cold right ankle/foot discrimination and unaltered hot/cold left ankle/foot discrimination. Unaltered sharp/dull right ankle/foot discrimination and unaltered sharp/dull left ankle/foot discrimination. No right ankle/foot altered proprioception and no left ankle/foot altered proprioception  Vascular Status -  His right foot exhibits normal foot vasculature  and no edema. His left foot exhibits normal foot vasculature  and no edema.  Skin Integrity  -  His right foot skin is intact.His left foot skin is intact..   Foot Structure and Biomechanics -  His right foot has no hallux valgus, no pes cavus, no claw toes, no hammer toes and no cavovarus present. His left foot has no hallux valgus, no pes cavus, no claw toes, no hammer toes and no cavovarus.  Neurological: He is alert and oriented to person, place, and time.   Skin: Skin is warm and dry.   Psychiatric: He has a normal mood and " affect. His behavior is normal.     Lab Results   Component Value Date    HGBA1C 6.7 (H) 08/04/2020     Lab Results   Component Value Date    CHOL 149 02/25/2019    CHLPL 139 02/27/2020    TRIG 75 02/27/2020    HDL 60 02/27/2020    LDL 64 02/27/2020         Diagnoses and all orders for this visit:    1. Type 2 diabetes mellitus with hyperglycemia, without long-term current use of insulin (CMS/[facility]) (Primary)  Assessment & Plan:  Diabetes is improving with treatment.   Regular aerobic exercise.  Discussed ways to avoid symptomatic hypoglycemia.  Discussed sick day management.  Discussed foot care.  Reminded to get yearly retinal exam.  Diabetes will be reassessed in 6 months.      2. Essential hypertension  Assessment & Plan:  Hypertension is improving with treatment.  Weight loss.  Regular aerobic exercise.  Continue current medications.  Blood pressure will be reassessed at the next regular appointment.      3. Chronic coronary artery disease  Assessment & Plan:  Coronary artery disease is unchanged.  Continue current treatment regimen.  Weight loss.  Regular aerobic exercise.  Continue current medications.  Cardiac status will be reassessed in 6 months.

## 2020-08-31 NOTE — ASSESSMENT & PLAN NOTE
Hypertension is improving with treatment.  Weight loss.  Regular aerobic exercise.  Continue current medications.  Blood pressure will be reassessed at the next regular appointment.

## 2020-09-10 ENCOUNTER — OFFICE VISIT (OUTPATIENT)
Dept: PAIN MEDICINE | Facility: CLINIC | Age: 70
End: 2020-09-10

## 2020-09-10 VITALS — RESPIRATION RATE: 16 BRPM | WEIGHT: 180 LBS | HEIGHT: 68 IN | BODY MASS INDEX: 27.28 KG/M2

## 2020-09-10 DIAGNOSIS — Z79.899 HIGH RISK MEDICATION USE: ICD-10-CM

## 2020-09-10 DIAGNOSIS — M79.2 NEUROPATHIC PAIN: ICD-10-CM

## 2020-09-10 DIAGNOSIS — G89.4 CHRONIC PAIN SYNDROME: Primary | ICD-10-CM

## 2020-09-10 DIAGNOSIS — M25.50 POLYARTHRALGIA: ICD-10-CM

## 2020-09-10 DIAGNOSIS — M47.812 CERVICAL SPONDYLOSIS WITHOUT MYELOPATHY: ICD-10-CM

## 2020-09-10 PROCEDURE — 99214 OFFICE O/P EST MOD 30 MIN: CPT | Performed by: ANESTHESIOLOGY

## 2020-09-10 RX ORDER — MORPHINE SULFATE 15 MG/1
15 TABLET, FILM COATED, EXTENDED RELEASE ORAL 3 TIMES DAILY PRN
Qty: 90 TABLET | Refills: 0 | Status: SHIPPED | OUTPATIENT
Start: 2020-10-10 | End: 2020-11-10 | Stop reason: SDUPTHER

## 2020-09-10 RX ORDER — MORPHINE SULFATE 15 MG/1
15 TABLET, FILM COATED, EXTENDED RELEASE ORAL 3 TIMES DAILY PRN
Qty: 90 TABLET | Refills: 0 | Status: SHIPPED | OUTPATIENT
Start: 2020-09-10 | End: 2020-11-10 | Stop reason: ALTCHOICE

## 2020-09-10 RX ORDER — LUBIPROSTONE 24 UG/1
CAPSULE, GELATIN COATED ORAL
Qty: 60 CAPSULE | Refills: 10 | OUTPATIENT
Start: 2020-09-10
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
## 2020-09-22 RX ORDER — LUBIPROSTONE 24 UG/1
24 CAPSULE ORAL 2 TIMES DAILY
Qty: 60 CAPSULE | Refills: 11 | Status: SHIPPED | OUTPATIENT
Start: 2020-09-22 | End: 2021-09-16 | Stop reason: SDUPTHER

## 2020-10-02 RX ORDER — BACLOFEN 10 MG/1
TABLET ORAL
Qty: 90 TABLET | Refills: 1 | Status: SHIPPED | OUTPATIENT
Start: 2020-10-02 | End: 2020-11-25

## 2020-10-02 RX ORDER — GLIPIZIDE 5 MG/1
TABLET ORAL
Qty: 30 TABLET | Refills: 0 | Status: SHIPPED | OUTPATIENT
Start: 2020-10-02 | End: 2020-10-23

## 2020-10-02 RX ORDER — ATENOLOL 50 MG/1
TABLET ORAL
Qty: 30 TABLET | Refills: 0 | Status: SHIPPED | OUTPATIENT
Start: 2020-10-02 | End: 2020-10-23

## 2020-10-02 RX ORDER — CYANOCOBALAMIN 1000 UG/ML
INJECTION, SOLUTION INTRAMUSCULAR; SUBCUTANEOUS
Qty: 1 ML | Refills: 0 | Status: SHIPPED | OUTPATIENT
Start: 2020-10-02 | End: 2020-10-23

## 2020-10-02 RX ORDER — ATORVASTATIN CALCIUM 40 MG/1
TABLET, FILM COATED ORAL
Qty: 30 TABLET | Refills: 5 | Status: SHIPPED | OUTPATIENT
Start: 2020-10-02 | End: 2021-03-18

## 2020-10-02 RX ORDER — PENTOXIFYLLINE 400 MG/1
TABLET, EXTENDED RELEASE ORAL
Qty: 90 TABLET | Refills: 2 | Status: SHIPPED | OUTPATIENT
Start: 2020-10-02 | End: 2020-12-18 | Stop reason: SDUPTHER

## 2020-10-23 RX ORDER — SYRINGE WITH NEEDLE, 1 ML 25GX5/8"
SYRINGE, EMPTY DISPOSABLE MISCELLANEOUS
Qty: 100 EACH | Refills: 4 | Status: SHIPPED | OUTPATIENT
Start: 2020-10-23 | End: 2021-11-05

## 2020-10-23 RX ORDER — ATENOLOL 50 MG/1
TABLET ORAL
Qty: 30 TABLET | Refills: 0 | Status: SHIPPED | OUTPATIENT
Start: 2020-10-23 | End: 2020-11-25

## 2020-10-23 RX ORDER — CYANOCOBALAMIN 1000 UG/ML
INJECTION, SOLUTION INTRAMUSCULAR; SUBCUTANEOUS
Qty: 1 ML | Refills: 0 | Status: SHIPPED | OUTPATIENT
Start: 2020-10-23 | End: 2020-11-25

## 2020-10-23 RX ORDER — GLIPIZIDE 5 MG/1
TABLET ORAL
Qty: 30 TABLET | Refills: 0 | Status: SHIPPED | OUTPATIENT
Start: 2020-10-23 | End: 2020-11-25

## 2020-11-10 ENCOUNTER — OFFICE VISIT (OUTPATIENT)
Dept: PAIN MEDICINE | Facility: CLINIC | Age: 70
End: 2020-11-10

## 2020-11-10 ENCOUNTER — TELEPHONE (OUTPATIENT)
Dept: PAIN MEDICINE | Facility: HOSPITAL | Age: 70
End: 2020-11-10

## 2020-11-10 VITALS — HEIGHT: 68 IN | BODY MASS INDEX: 27.28 KG/M2 | WEIGHT: 180 LBS | RESPIRATION RATE: 16 BRPM

## 2020-11-10 DIAGNOSIS — G89.4 CHRONIC PAIN SYNDROME: Primary | ICD-10-CM

## 2020-11-10 DIAGNOSIS — M25.50 POLYARTHRALGIA: ICD-10-CM

## 2020-11-10 DIAGNOSIS — Z79.899 HIGH RISK MEDICATION USE: ICD-10-CM

## 2020-11-10 DIAGNOSIS — M79.2 NEUROPATHIC PAIN: ICD-10-CM

## 2020-11-10 DIAGNOSIS — M47.812 CERVICAL SPONDYLOSIS WITHOUT MYELOPATHY: ICD-10-CM

## 2020-11-10 PROCEDURE — 99443 PR PHYS/QHP TELEPHONE EVALUATION 21-30 MIN: CPT | Performed by: ANESTHESIOLOGY

## 2020-11-10 RX ORDER — MORPHINE SULFATE 15 MG/1
15 TABLET, FILM COATED, EXTENDED RELEASE ORAL 3 TIMES DAILY PRN
Qty: 90 TABLET | Refills: 0 | Status: SHIPPED | OUTPATIENT
Start: 2020-11-14 | End: 2021-01-07 | Stop reason: SDUPTHER

## 2020-11-10 RX ORDER — MORPHINE SULFATE 15 MG/1
15 TABLET, FILM COATED, EXTENDED RELEASE ORAL 3 TIMES DAILY PRN
Qty: 90 TABLET | Refills: 0 | Status: SHIPPED | OUTPATIENT
Start: 2020-12-13 | End: 2021-01-07 | Stop reason: SDUPTHER
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
## 2020-11-12 ENCOUNTER — LAB (OUTPATIENT)
Dept: LAB | Facility: HOSPITAL | Age: 70
End: 2020-11-12

## 2020-11-25 RX ORDER — CYANOCOBALAMIN 1000 UG/ML
INJECTION, SOLUTION INTRAMUSCULAR; SUBCUTANEOUS
Qty: 1 ML | Refills: 0 | Status: SHIPPED | OUTPATIENT
Start: 2020-11-25 | End: 2020-12-18 | Stop reason: SDUPTHER

## 2020-11-25 RX ORDER — BACLOFEN 10 MG/1
TABLET ORAL
Qty: 90 TABLET | Refills: 0 | Status: SHIPPED | OUTPATIENT
Start: 2020-11-25 | End: 2020-12-18 | Stop reason: SDUPTHER

## 2020-11-25 RX ORDER — GLIPIZIDE 5 MG/1
TABLET ORAL
Qty: 30 TABLET | Refills: 0 | Status: SHIPPED | OUTPATIENT
Start: 2020-11-25 | End: 2020-12-18 | Stop reason: SDUPTHER

## 2020-11-25 RX ORDER — ATENOLOL 50 MG/1
TABLET ORAL
Qty: 30 TABLET | Refills: 0 | Status: SHIPPED | OUTPATIENT
Start: 2020-11-25 | End: 2020-12-18 | Stop reason: SDUPTHER

## 2020-12-18 RX ORDER — CYANOCOBALAMIN 1000 UG/ML
INJECTION, SOLUTION INTRAMUSCULAR; SUBCUTANEOUS
Qty: 1 ML | Refills: 0 | Status: SHIPPED | OUTPATIENT
Start: 2020-12-18 | End: 2021-01-27

## 2020-12-18 RX ORDER — ATENOLOL 50 MG/1
TABLET ORAL
Qty: 30 TABLET | Refills: 0 | Status: SHIPPED | OUTPATIENT
Start: 2020-12-18 | End: 2021-01-27

## 2020-12-18 RX ORDER — BACLOFEN 10 MG/1
TABLET ORAL
Qty: 90 TABLET | Refills: 0 | Status: SHIPPED | OUTPATIENT
Start: 2020-12-18 | End: 2021-01-27

## 2020-12-18 RX ORDER — GLIPIZIDE 5 MG/1
TABLET ORAL
Qty: 30 TABLET | Refills: 0 | Status: SHIPPED | OUTPATIENT
Start: 2020-12-18 | End: 2021-01-27

## 2020-12-18 RX ORDER — PENTOXIFYLLINE 400 MG/1
TABLET, EXTENDED RELEASE ORAL
Qty: 90 TABLET | Refills: 1 | Status: SHIPPED | OUTPATIENT
Start: 2020-12-18 | End: 2021-02-20

## 2021-01-07 ENCOUNTER — OFFICE VISIT (OUTPATIENT)
Dept: PAIN MEDICINE | Facility: CLINIC | Age: 71
End: 2021-01-07

## 2021-01-07 VITALS — BODY MASS INDEX: 27.28 KG/M2 | WEIGHT: 180 LBS | HEIGHT: 68 IN | RESPIRATION RATE: 16 BRPM

## 2021-01-07 DIAGNOSIS — M47.812 CERVICAL SPONDYLOSIS WITHOUT MYELOPATHY: ICD-10-CM

## 2021-01-07 DIAGNOSIS — G89.4 CHRONIC PAIN SYNDROME: Primary | ICD-10-CM

## 2021-01-07 DIAGNOSIS — M25.50 POLYARTHRALGIA: ICD-10-CM

## 2021-01-07 DIAGNOSIS — M79.2 NEUROPATHIC PAIN: ICD-10-CM

## 2021-01-07 DIAGNOSIS — Z79.899 HIGH RISK MEDICATION USE: ICD-10-CM

## 2021-01-07 PROCEDURE — 99443 PR PHYS/QHP TELEPHONE EVALUATION 21-30 MIN: CPT | Performed by: ANESTHESIOLOGY

## 2021-01-07 RX ORDER — MORPHINE SULFATE 15 MG/1
15 TABLET, FILM COATED, EXTENDED RELEASE ORAL 3 TIMES DAILY PRN
Qty: 90 TABLET | Refills: 0 | Status: SHIPPED | OUTPATIENT
Start: 2021-01-12 | End: 2021-03-01

## 2021-01-07 RX ORDER — MORPHINE SULFATE 15 MG/1
15 TABLET, FILM COATED, EXTENDED RELEASE ORAL 3 TIMES DAILY PRN
Qty: 90 TABLET | Refills: 0 | Status: SHIPPED | OUTPATIENT
Start: 2021-02-11 | End: 2021-03-10 | Stop reason: SDUPTHER
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
## 2021-01-07 NOTE — PROGRESS NOTES
You have chosen to receive care through a telephone visit. Do you consent to use a telephone visit for your medical care today? Yes

## 2021-01-15 ENCOUNTER — TELEPHONE (OUTPATIENT)
Dept: PAIN MEDICINE | Facility: CLINIC | Age: 71
End: 2021-01-15

## 2021-01-15 RX ORDER — GABAPENTIN ENACARBIL 600 MG/1
600 TABLET, EXTENDED RELEASE ORAL 2 TIMES DAILY
Qty: 60 TABLET | Refills: 1 | Status: SHIPPED | OUTPATIENT
Start: 2021-01-15 | End: 2021-01-19 | Stop reason: SDUPTHER

## 2021-01-18 ENCOUNTER — TELEPHONE (OUTPATIENT)
Dept: PAIN MEDICINE | Facility: HOSPITAL | Age: 71
End: 2021-01-18

## 2021-01-18 NOTE — TELEPHONE ENCOUNTER
States his Horizant is 90 per month looks like Dr. Campos sent in 60, can you send the rest in if he does need 90?

## 2021-01-19 DIAGNOSIS — M79.2 NEUROPATHIC PAIN: Primary | ICD-10-CM

## 2021-01-19 RX ORDER — GABAPENTIN ENACARBIL 600 MG/1
600 TABLET, EXTENDED RELEASE ORAL 3 TIMES DAILY
Qty: 90 TABLET | Refills: 11 | Status: SHIPPED | OUTPATIENT
Start: 2021-01-19 | End: 2022-01-05 | Stop reason: SDUPTHER

## 2021-01-22 ENCOUNTER — TELEPHONE (OUTPATIENT)
Dept: PAIN MEDICINE | Facility: HOSPITAL | Age: 71
End: 2021-01-22

## 2021-01-22 NOTE — TELEPHONE ENCOUNTER
He had a procedure yesterday at his Urologist was prescribed Lortab 5 mg he wants to know if he can fill them?

## 2021-01-27 RX ORDER — ATENOLOL 50 MG/1
TABLET ORAL
Qty: 30 TABLET | Refills: 0 | Status: SHIPPED | OUTPATIENT
Start: 2021-01-27 | End: 2021-02-20

## 2021-01-27 RX ORDER — CYANOCOBALAMIN 1000 UG/ML
INJECTION, SOLUTION INTRAMUSCULAR; SUBCUTANEOUS
Qty: 1 ML | Refills: 0 | Status: SHIPPED | OUTPATIENT
Start: 2021-01-27 | End: 2021-02-20

## 2021-01-27 RX ORDER — BACLOFEN 10 MG/1
TABLET ORAL
Qty: 90 TABLET | Refills: 0 | Status: SHIPPED | OUTPATIENT
Start: 2021-01-27 | End: 2021-02-20

## 2021-01-27 RX ORDER — AMLODIPINE BESYLATE 5 MG/1
TABLET ORAL
Qty: 180 TABLET | Refills: 2 | Status: SHIPPED | OUTPATIENT
Start: 2021-01-27 | End: 2021-10-10

## 2021-01-27 RX ORDER — GLIPIZIDE 5 MG/1
TABLET ORAL
Qty: 30 TABLET | Refills: 0 | Status: SHIPPED | OUTPATIENT
Start: 2021-01-27 | End: 2021-02-20

## 2021-02-20 RX ORDER — GLIPIZIDE 5 MG/1
TABLET ORAL
Qty: 30 TABLET | Refills: 0 | Status: SHIPPED | OUTPATIENT
Start: 2021-02-20 | End: 2021-03-18

## 2021-02-20 RX ORDER — BACLOFEN 10 MG/1
TABLET ORAL
Qty: 90 TABLET | Refills: 0 | Status: SHIPPED | OUTPATIENT
Start: 2021-02-20 | End: 2021-03-18

## 2021-02-20 RX ORDER — ATENOLOL 50 MG/1
TABLET ORAL
Qty: 30 TABLET | Refills: 0 | Status: SHIPPED | OUTPATIENT
Start: 2021-02-20 | End: 2021-03-18

## 2021-02-20 RX ORDER — CYANOCOBALAMIN 1000 UG/ML
INJECTION, SOLUTION INTRAMUSCULAR; SUBCUTANEOUS
Qty: 1 ML | Refills: 0 | Status: SHIPPED | OUTPATIENT
Start: 2021-02-20 | End: 2021-03-18

## 2021-02-20 RX ORDER — PENTOXIFYLLINE 400 MG/1
TABLET, EXTENDED RELEASE ORAL
Qty: 90 TABLET | Refills: 0 | Status: SHIPPED | OUTPATIENT
Start: 2021-02-20 | End: 2021-03-18

## 2021-03-01 ENCOUNTER — OFFICE VISIT (OUTPATIENT)
Dept: FAMILY MEDICINE CLINIC | Facility: CLINIC | Age: 71
End: 2021-03-01

## 2021-03-01 VITALS
HEART RATE: 70 BPM | WEIGHT: 180 LBS | RESPIRATION RATE: 18 BRPM | SYSTOLIC BLOOD PRESSURE: 120 MMHG | HEIGHT: 68 IN | TEMPERATURE: 97.8 F | BODY MASS INDEX: 27.28 KG/M2 | DIASTOLIC BLOOD PRESSURE: 72 MMHG | OXYGEN SATURATION: 96 %

## 2021-03-01 DIAGNOSIS — M79.2 NEUROPATHIC PAIN: ICD-10-CM

## 2021-03-01 DIAGNOSIS — Z00.00 MEDICARE ANNUAL WELLNESS VISIT, SUBSEQUENT: ICD-10-CM

## 2021-03-01 DIAGNOSIS — Z12.5 SCREENING PSA (PROSTATE SPECIFIC ANTIGEN): ICD-10-CM

## 2021-03-01 DIAGNOSIS — I25.10 CHRONIC CORONARY ARTERY DISEASE: ICD-10-CM

## 2021-03-01 DIAGNOSIS — Z00.01 ENCOUNTER FOR GENERAL ADULT MEDICAL EXAMINATION WITH ABNORMAL FINDINGS: ICD-10-CM

## 2021-03-01 DIAGNOSIS — I65.23 BILATERAL CAROTID ARTERY STENOSIS: ICD-10-CM

## 2021-03-01 DIAGNOSIS — E11.65 TYPE 2 DIABETES MELLITUS WITH HYPERGLYCEMIA, WITHOUT LONG-TERM CURRENT USE OF INSULIN (HCC): Primary | ICD-10-CM

## 2021-03-01 DIAGNOSIS — G47.9 SLEEP DISORDER: ICD-10-CM

## 2021-03-01 DIAGNOSIS — I10 ESSENTIAL HYPERTENSION: ICD-10-CM

## 2021-03-01 DIAGNOSIS — E78.2 MIXED HYPERLIPIDEMIA: ICD-10-CM

## 2021-03-01 DIAGNOSIS — H34.219 CHOLESTEROL RETINAL EMBOLUS: ICD-10-CM

## 2021-03-01 DIAGNOSIS — G89.4 CHRONIC PAIN SYNDROME: ICD-10-CM

## 2021-03-01 DIAGNOSIS — N40.0 ENLARGED PROSTATE WITHOUT LOWER URINARY TRACT SYMPTOMS (LUTS): ICD-10-CM

## 2021-03-01 DIAGNOSIS — R26.81 UNSTEADY GAIT: ICD-10-CM

## 2021-03-01 DIAGNOSIS — N18.31 STAGE 3A CHRONIC KIDNEY DISEASE (HCC): ICD-10-CM

## 2021-03-01 PROBLEM — N13.9 OBSTRUCTIVE UROPATHY: Status: RESOLVED | Noted: 2018-04-05 | Resolved: 2021-03-01

## 2021-03-01 PROBLEM — H53.139 SUDDEN VISUAL LOSS: Status: RESOLVED | Noted: 2017-03-24 | Resolved: 2021-03-01

## 2021-03-01 LAB
BILIRUB BLD-MCNC: ABNORMAL MG/DL
CLARITY, POC: CLEAR
COLOR UR: YELLOW
GLUCOSE UR STRIP-MCNC: ABNORMAL MG/DL
HBA1C MFR BLD: 7.6 %
KETONES UR QL: NEGATIVE
LEUKOCYTE EST, POC: NEGATIVE
NITRITE UR-MCNC: NEGATIVE MG/ML
PH UR: 5.5 [PH] (ref 5–8)
PROT UR STRIP-MCNC: ABNORMAL MG/DL
RBC # UR STRIP: NEGATIVE /UL
SP GR UR: 1.03 (ref 1–1.03)
UROBILINOGEN UR QL: NORMAL

## 2021-03-01 PROCEDURE — 99214 OFFICE O/P EST MOD 30 MIN: CPT | Performed by: FAMILY MEDICINE

## 2021-03-01 PROCEDURE — G0439 PPPS, SUBSEQ VISIT: HCPCS | Performed by: FAMILY MEDICINE

## 2021-03-01 PROCEDURE — 81002 URINALYSIS NONAUTO W/O SCOPE: CPT | Performed by: FAMILY MEDICINE

## 2021-03-01 PROCEDURE — 83036 HEMOGLOBIN GLYCOSYLATED A1C: CPT | Performed by: FAMILY MEDICINE

## 2021-03-01 NOTE — ASSESSMENT & PLAN NOTE
Diabetes is unchanged.   Continue current treatment regimen.  Dietary recommendations for ADA diet.  Regular aerobic exercise.  Discussed ways to avoid symptomatic hypoglycemia.  Discussed sick day management.  Discussed foot care.  Reminded to get yearly retinal exam.  Diabetes will be reassessed in 3 months.  Stable but not at goal.  Recommend better dietary compliance and increased physical activity if possible.

## 2021-03-01 NOTE — ASSESSMENT & PLAN NOTE
Risk modification including blood pressure monitoring, blood sugar monitoring, lipid lowering medications, and aspirin.

## 2021-03-02 LAB
ALBUMIN SERPL-MCNC: 4.4 G/DL (ref 3.7–4.7)
ALBUMIN/CREAT UR: 18 MG/G CREAT (ref 0–29)
ALBUMIN/GLOB SERPL: 1.5 {RATIO} (ref 1.2–2.2)
ALP SERPL-CCNC: 113 IU/L (ref 39–117)
ALT SERPL-CCNC: 17 IU/L (ref 0–44)
AST SERPL-CCNC: 13 IU/L (ref 0–40)
BASOPHILS # BLD AUTO: 0 X10E3/UL (ref 0–0.2)
BASOPHILS NFR BLD AUTO: 1 %
BILIRUB SERPL-MCNC: 0.6 MG/DL (ref 0–1.2)
BUN SERPL-MCNC: 24 MG/DL (ref 8–27)
BUN/CREAT SERPL: 21 (ref 10–24)
CALCIUM SERPL-MCNC: 9.3 MG/DL (ref 8.6–10.2)
CHLORIDE SERPL-SCNC: 107 MMOL/L (ref 96–106)
CHOLEST SERPL-MCNC: 133 MG/DL (ref 100–199)
CO2 SERPL-SCNC: 23 MMOL/L (ref 20–29)
CREAT SERPL-MCNC: 1.14 MG/DL (ref 0.76–1.27)
CREAT UR-MCNC: 264.7 MG/DL
EOSINOPHIL # BLD AUTO: 0.3 X10E3/UL (ref 0–0.4)
EOSINOPHIL NFR BLD AUTO: 4 %
ERYTHROCYTE [DISTWIDTH] IN BLOOD BY AUTOMATED COUNT: 12.8 % (ref 11.6–15.4)
GLOBULIN SER CALC-MCNC: 3 G/DL (ref 1.5–4.5)
GLUCOSE SERPL-MCNC: 143 MG/DL (ref 65–99)
HCT VFR BLD AUTO: 39 % (ref 37.5–51)
HDLC SERPL-MCNC: 44 MG/DL
HGB BLD-MCNC: 13.5 G/DL (ref 13–17.7)
IMM GRANULOCYTES # BLD AUTO: 0 X10E3/UL (ref 0–0.1)
IMM GRANULOCYTES NFR BLD AUTO: 0 %
LDLC SERPL CALC-MCNC: 60 MG/DL (ref 0–99)
LYMPHOCYTES # BLD AUTO: 2 X10E3/UL (ref 0.7–3.1)
LYMPHOCYTES NFR BLD AUTO: 26 %
MCH RBC QN AUTO: 29.6 PG (ref 26.6–33)
MCHC RBC AUTO-ENTMCNC: 34.6 G/DL (ref 31.5–35.7)
MCV RBC AUTO: 86 FL (ref 79–97)
MICROALBUMIN UR-MCNC: 48.9 UG/ML
MONOCYTES # BLD AUTO: 0.7 X10E3/UL (ref 0.1–0.9)
MONOCYTES NFR BLD AUTO: 9 %
NEUTROPHILS # BLD AUTO: 4.6 X10E3/UL (ref 1.4–7)
NEUTROPHILS NFR BLD AUTO: 60 %
PLATELET # BLD AUTO: 266 X10E3/UL (ref 150–450)
POTASSIUM SERPL-SCNC: 4.5 MMOL/L (ref 3.5–5.2)
PROT SERPL-MCNC: 7.4 G/DL (ref 6–8.5)
PSA SERPL-MCNC: 0.9 NG/ML (ref 0–4)
RBC # BLD AUTO: 4.56 X10E6/UL (ref 4.14–5.8)
SODIUM SERPL-SCNC: 144 MMOL/L (ref 134–144)
TRIGL SERPL-MCNC: 172 MG/DL (ref 0–149)
VLDLC SERPL CALC-MCNC: 29 MG/DL (ref 5–40)
WBC # BLD AUTO: 7.6 X10E3/UL (ref 3.4–10.8)

## 2021-03-09 RX ORDER — BLOOD SUGAR DIAGNOSTIC
STRIP MISCELLANEOUS
Qty: 100 EACH | Refills: 3 | Status: SHIPPED | OUTPATIENT
Start: 2021-03-09 | End: 2022-09-20 | Stop reason: SDUPTHER

## 2021-03-10 ENCOUNTER — OFFICE VISIT (OUTPATIENT)
Dept: PAIN MEDICINE | Facility: CLINIC | Age: 71
End: 2021-03-10

## 2021-03-10 VITALS
DIASTOLIC BLOOD PRESSURE: 80 MMHG | OXYGEN SATURATION: 96 % | HEIGHT: 68 IN | TEMPERATURE: 96.6 F | BODY MASS INDEX: 27.28 KG/M2 | RESPIRATION RATE: 16 BRPM | SYSTOLIC BLOOD PRESSURE: 137 MMHG | WEIGHT: 180 LBS | HEART RATE: 65 BPM

## 2021-03-10 DIAGNOSIS — Z79.899 HIGH RISK MEDICATION USE: ICD-10-CM

## 2021-03-10 DIAGNOSIS — M79.2 NEUROPATHIC PAIN: ICD-10-CM

## 2021-03-10 DIAGNOSIS — G89.4 CHRONIC PAIN SYNDROME: ICD-10-CM

## 2021-03-10 DIAGNOSIS — M47.812 CERVICAL SPONDYLOSIS WITHOUT MYELOPATHY: ICD-10-CM

## 2021-03-10 DIAGNOSIS — F19.90 CURRENT DRUG USE: Primary | ICD-10-CM

## 2021-03-10 DIAGNOSIS — M25.50 POLYARTHRALGIA: ICD-10-CM

## 2021-03-10 PROCEDURE — 99214 OFFICE O/P EST MOD 30 MIN: CPT | Performed by: ANESTHESIOLOGY

## 2021-03-10 RX ORDER — MORPHINE SULFATE 15 MG/1
15 TABLET, FILM COATED, EXTENDED RELEASE ORAL 3 TIMES DAILY PRN
Qty: 90 TABLET | Refills: 0 | Status: SHIPPED | OUTPATIENT
Start: 2021-04-10 | End: 2021-05-06 | Stop reason: SDUPTHER

## 2021-03-10 RX ORDER — MORPHINE SULFATE 15 MG/1
15 TABLET, FILM COATED, EXTENDED RELEASE ORAL 3 TIMES DAILY
Qty: 90 TABLET | Refills: 0 | Status: SHIPPED | OUTPATIENT
Start: 2021-03-12 | End: 2021-05-06 | Stop reason: SDUPTHER

## 2021-03-11 ENCOUNTER — HOSPITAL ENCOUNTER (OUTPATIENT)
Dept: ULTRASOUND IMAGING | Facility: HOSPITAL | Age: 71
Discharge: HOME | End: 2021-03-11
Admitting: FAMILY MEDICINE

## 2021-03-11 DIAGNOSIS — I65.23 BILATERAL CAROTID ARTERY STENOSIS: ICD-10-CM

## 2021-03-11 PROCEDURE — 93880 EXTRACRANIAL BILAT STUDY: CPT
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
## 2021-03-18 RX ORDER — PENTOXIFYLLINE 400 MG/1
TABLET, EXTENDED RELEASE ORAL
Qty: 90 TABLET | Refills: 0 | Status: SHIPPED | OUTPATIENT
Start: 2021-03-18 | End: 2021-04-23 | Stop reason: SDUPTHER

## 2021-03-18 RX ORDER — CYANOCOBALAMIN 1000 UG/ML
INJECTION, SOLUTION INTRAMUSCULAR; SUBCUTANEOUS
Qty: 1 ML | Refills: 0 | Status: SHIPPED | OUTPATIENT
Start: 2021-03-18 | End: 2021-04-23 | Stop reason: SDUPTHER

## 2021-03-18 RX ORDER — BACLOFEN 10 MG/1
TABLET ORAL
Qty: 90 TABLET | Refills: 0 | Status: SHIPPED | OUTPATIENT
Start: 2021-03-18 | End: 2021-04-23 | Stop reason: SDUPTHER

## 2021-03-18 RX ORDER — GLIPIZIDE 5 MG/1
TABLET ORAL
Qty: 30 TABLET | Refills: 0 | Status: SHIPPED | OUTPATIENT
Start: 2021-03-18 | End: 2021-04-23 | Stop reason: SDUPTHER

## 2021-03-18 RX ORDER — ATENOLOL 50 MG/1
TABLET ORAL
Qty: 30 TABLET | Refills: 0 | Status: SHIPPED | OUTPATIENT
Start: 2021-03-18 | End: 2021-04-23 | Stop reason: SDUPTHER

## 2021-03-18 RX ORDER — ATORVASTATIN CALCIUM 40 MG/1
TABLET, FILM COATED ORAL
Qty: 30 TABLET | Refills: 4 | Status: SHIPPED | OUTPATIENT
Start: 2021-03-18 | End: 2021-08-13

## 2021-04-23 RX ORDER — BACLOFEN 10 MG/1
TABLET ORAL
Qty: 90 TABLET | Refills: 0 | Status: SHIPPED | OUTPATIENT
Start: 2021-04-23 | End: 2021-05-27

## 2021-04-23 RX ORDER — CYANOCOBALAMIN 1000 UG/ML
INJECTION, SOLUTION INTRAMUSCULAR; SUBCUTANEOUS
Qty: 1 ML | Refills: 0 | Status: SHIPPED | OUTPATIENT
Start: 2021-04-23 | End: 2021-05-27

## 2021-04-23 RX ORDER — ATENOLOL 50 MG/1
TABLET ORAL
Qty: 30 TABLET | Refills: 0 | Status: SHIPPED | OUTPATIENT
Start: 2021-04-23 | End: 2021-05-27

## 2021-04-23 RX ORDER — GLIPIZIDE 5 MG/1
TABLET ORAL
Qty: 30 TABLET | Refills: 0 | Status: SHIPPED | OUTPATIENT
Start: 2021-04-23 | End: 2021-05-27

## 2021-04-23 RX ORDER — PENTOXIFYLLINE 400 MG/1
TABLET, EXTENDED RELEASE ORAL
Qty: 90 TABLET | Refills: 0 | Status: SHIPPED | OUTPATIENT
Start: 2021-04-23 | End: 2021-05-27

## 2021-04-28 ENCOUNTER — OFFICE VISIT (OUTPATIENT)
Dept: CARDIOLOGY | Facility: CLINIC | Age: 71
End: 2021-04-28

## 2021-04-28 VITALS
WEIGHT: 180 LBS | HEIGHT: 68 IN | DIASTOLIC BLOOD PRESSURE: 81 MMHG | HEART RATE: 62 BPM | OXYGEN SATURATION: 97 % | BODY MASS INDEX: 27.28 KG/M2 | SYSTOLIC BLOOD PRESSURE: 157 MMHG | RESPIRATION RATE: 18 BRPM

## 2021-04-28 DIAGNOSIS — I25.10 CHRONIC CORONARY ARTERY DISEASE: ICD-10-CM

## 2021-04-28 DIAGNOSIS — Z79.899 OTHER LONG TERM (CURRENT) DRUG THERAPY: ICD-10-CM

## 2021-04-28 DIAGNOSIS — I10 ESSENTIAL HYPERTENSION: Primary | ICD-10-CM

## 2021-04-28 DIAGNOSIS — I25.10 CORONARY ARTERY DISEASE INVOLVING NATIVE CORONARY ARTERY OF NATIVE HEART WITHOUT ANGINA PECTORIS: ICD-10-CM

## 2021-04-28 DIAGNOSIS — E78.2 MIXED HYPERLIPIDEMIA: ICD-10-CM

## 2021-04-28 PROCEDURE — 99214 OFFICE O/P EST MOD 30 MIN: CPT | Performed by: INTERNAL MEDICINE

## 2021-04-28 PROCEDURE — 93000 ELECTROCARDIOGRAM COMPLETE: CPT | Performed by: INTERNAL MEDICINE

## 2021-04-28 NOTE — PROGRESS NOTES
"Cardiology Office Visit      Encounter Date:  04/28/2021    Patient ID:   Merlin Trujillo is a 71 y.o. male.    Reason For Followup:  Peripheral artery disease  Carotid stenosis  Hypertension  Hyperlipidemia    Brief Clinical History:  Dear Dr. Lyell, Reggie Duane, MD    I had the pleasure of seeing Merlin Trujillo today. As you are well aware, this is a 71 y.o. male with past medical history that is significant for history of hypertension hyperlipidemia  and peripheral arterial disease here for follow-up    Patient had symptoms of TIA with amaurosis fugax in the right eye.  Noted to have significant carotid stenosis in the right carotid artery  Status post a successful right carotid endarterectomy  Patient had recent hospitalization with urosepsis  Echocardiogram showed normal LV systolic function        Interval History:  Denies any chest pain  Patient is complaining of some nonspecific fatigue and weakness  No recent cardiac evaluation or work-up  Denies any dizziness or syncope      Assessment & Plan    Impressions:     Hypertension   hyperlipidemia   peripheral arterial disease   presumed coronary artery disease but no evidence of any inducible ischemia on the stress test  Symptoms of fatigue weakness    Recommendations:  Plan to schedule for Lexiscan Cardiolite stress test for further risk stratification  Risk benefits and alternatives reviewed and discussed with the patient   continued aggressive risk factor modification   regular exercise   labs with primary care physician office  Labs discussed with the patient  Recent labs reviewed and discussed with the patient  Medications reviewed and discussed with patient   follow up in office in 6 months    Objective:    Vitals:  Vitals:    04/28/21 1441   BP: 157/81   Pulse: 62   Resp: 18   SpO2: 97%   Weight: 81.6 kg (180 lb)   Height: 172.7 cm (68\")       Physical Exam:    General: Alert, cooperative, no distress, appears stated age  Head:  Normocephalic, " "atraumatic, mucous membranes moist  Eyes:  Conjunctiva/corneas clear, EOM's intact     Neck:  Supple,  no adenopathy;      Lungs: Clear to auscultation bilaterally, no wheezes rhonchi rales are noted  Chest wall: No tenderness  Heart::  Regular rate and rhythm, S1 and S2 normal, no murmur, rub or gallop  Abdomen: Soft, non-tender, nondistended bowel sounds active  Extremities: No cyanosis, clubbing, or edema  Pulses: 2+ and symmetric all extremities  Skin:  No rashes or lesions  Neuro/psych: A&O x3. CN II through XII are grossly intact with appropriate affect      Allergies:  No Known Allergies    Medication Review:     Current Outpatient Medications:   •  ACCU-CHEK FASTCLIX LANCETS misc, TEST EVERY DAY AS DIRECTED, Disp: 100 each, Rfl: 5  •  Accu-Chek Guide test strip, TEST ONCE DAILY AS DIRECTED, Disp: 100 each, Rfl: 3  •  amLODIPine (NORVASC) 5 MG tablet, TAKE ONE TABLET BY MOUTH TWICE DAILY, Disp: 180 tablet, Rfl: 2  •  aspirin 81 MG EC tablet, Take 81 mg by mouth Daily., Disp: , Rfl:   •  atenolol (TENORMIN) 50 MG tablet, TAKE 1/2 TABLET BY MOUTH TWICE DAILY, Disp: 30 tablet, Rfl: 0  •  atorvastatin (LIPITOR) 40 MG tablet, TAKE ONE TABLET BY MOUTH EVERY NIGHT AT BEDTIME, Disp: 30 tablet, Rfl: 4  •  B-D 3CC LUER-RONY SYR 25GX1\" 25G X 1\" 3 ML misc, USE AS DIRECTED WITH B-12 INJECTION, Disp: 100 each, Rfl: 4  •  baclofen (LIORESAL) 10 MG tablet, TAKE ONE TABLET BY MOUTH THREE TIMES DAILY, Disp: 90 tablet, Rfl: 0  •  cyanocobalamin 1000 MCG/ML injection, INJECT 1ML MONTHLY, Disp: 1 mL, Rfl: 0  •  Gabapentin Enacarbil ER (Horizant) 600 MG tablet controlled-release, Take 600 mg by mouth 3 (Three) Times a Day., Disp: 90 tablet, Rfl: 11  •  glipizide (GLUCOTROL) 5 MG tablet, TAKE ONE TABLET BY MOUTH EVERY DAY, Disp: 30 tablet, Rfl: 0  •  lubiprostone (Amitiza) 24 MCG capsule, Take 1 capsule by mouth 2 (Two) Times a Day., Disp: 60 capsule, Rfl: 11  •  Mirabegron (MYRBETRIQ PO), Take  by mouth., Disp: , Rfl:   •  Morphine " (MS CONTIN) 15 MG 12 hr tablet, Take 1 tablet by mouth 3 (Three) Times a Day., Disp: 90 tablet, Rfl: 0  •  Morphine (MS CONTIN) 15 MG 12 hr tablet, Take 1 tablet by mouth 3 (Three) Times a Day As Needed for Severe Pain . DNF before 4/10/2021, Disp: 90 tablet, Rfl: 0  •  pentoxifylline (TRENtal) 400 MG CR tablet, TAKE ONE TABLET BY MOUTH THREE TIMES DAILY, Disp: 90 tablet, Rfl: 0    Family History:  Family History   Problem Relation Age of Onset   • Dementia Mother    • Heart disease Mother    • COPD Father    • Stroke Father    • Heart disease Father    • Hypertension Sister    • Diabetes Sister    • Hypertension Brother        Past Medical History:  Past Medical History:   Diagnosis Date   • Adenomatous polyps    • Amaurosis fugax 3/27/2017   • Arm pain     left into hand   • Arm pain    • Blood in urine     3/2020   • BPH (benign prostatic hyperplasia)    • CAD (coronary artery disease)    • CKD (chronic kidney disease) stage 3, GFR 30-59 ml/min (CMS/HCC)    • CVA, old, speech/language deficit    • Diabetes (CMS/HCC)    • Diabetic acetonemia (CMS/HCC)    • Hearing loss    • Hematuria    • Hernia, inguinal    • Hyperlipidemia    • Hypertension    • Kidney stones    • Low back pain    • Neuropathic pain    • Obstructive uropathy    • Shoulder pain    • Sleep disorder    • Spondylosis, cervical    • Urgency of urination        Past surgical History:  Past Surgical History:   Procedure Laterality Date   • ANKLE SURGERY      replacement  rt   • CAROTID ARTERY ANGIOPLASTY     • CARPAL TUNNEL RELEASE     • COLONOSCOPY     • CYSTOSCOPY URETEROSCOPY LASER LITHOTRIPSY      kidney stones   • EYE SURGERY      mesh  and plate under rt eye due to orbital fx   • NOSE SURGERY      x2   • OTHER SURGICAL HISTORY      uro  lift  1 /2021   • TEETH EXTRACTION      dentures       Social History:  Social History     Socioeconomic History   • Marital status: Significant Other     Spouse name: Not on file   • Number of children: Not on  file   • Years of education: Not on file   • Highest education level: Not on file   Tobacco Use   • Smoking status: Never Smoker   • Smokeless tobacco: Never Used   Vaping Use   • Vaping Use: Never used   Substance and Sexual Activity   • Alcohol use: No   • Drug use: Never   • Sexual activity: Defer       Review of Systems:  The following systems were reviewed as they relate to the cardiovascular system: Constitutional, Eyes, ENT, Cardiovascular, Respiratory, Gastrointestinal, Integumentary, Neurological, Psychiatric, Hematologic, Endocrine, Musculoskeletal, and Genitourinary. The pertinent cardiovascular findings are reported above with all other cardiovascular points within those systems being negative.    Diagnostic Study Review:     Current Electrocardiogram:    ECG 12 Lead    Date/Time: 4/28/2021 3:14 PM  Performed by: Yury Iraheta MD  Authorized by: Yury Iraheta MD   Comparison: compared with previous ECG   Similar to previous ECG  Rhythm: sinus rhythm  Rate: normal  BPM: 62  Conduction: conduction normal  QRS axis: normal  Other findings: non-specific ST-T wave changes    Clinical impression: abnormal EKG  Comments: Normal QT interval              NOTE: The following portions of the patient's history were reviewed and updated this visit as appropriate: allergies, current medications, past family history, past medical history, past social history, past surgical history and problem list.

## 2021-05-06 ENCOUNTER — OFFICE VISIT (OUTPATIENT)
Dept: PAIN MEDICINE | Facility: CLINIC | Age: 71
End: 2021-05-06

## 2021-05-06 VITALS — HEIGHT: 68 IN | BODY MASS INDEX: 27.28 KG/M2 | RESPIRATION RATE: 16 BRPM | WEIGHT: 180 LBS

## 2021-05-06 DIAGNOSIS — Z79.899 HIGH RISK MEDICATION USE: ICD-10-CM

## 2021-05-06 DIAGNOSIS — M25.50 POLYARTHRALGIA: ICD-10-CM

## 2021-05-06 DIAGNOSIS — G89.4 CHRONIC PAIN SYNDROME: Primary | ICD-10-CM

## 2021-05-06 DIAGNOSIS — M47.812 CERVICAL SPONDYLOSIS WITHOUT MYELOPATHY: ICD-10-CM

## 2021-05-06 DIAGNOSIS — M79.2 NEUROPATHIC PAIN: ICD-10-CM

## 2021-05-06 PROCEDURE — 99443 PR PHYS/QHP TELEPHONE EVALUATION 21-30 MIN: CPT | Performed by: ANESTHESIOLOGY

## 2021-05-06 RX ORDER — MORPHINE SULFATE 15 MG/1
15 TABLET, FILM COATED, EXTENDED RELEASE ORAL 3 TIMES DAILY PRN
Qty: 90 TABLET | Refills: 0 | Status: SHIPPED | OUTPATIENT
Start: 2021-06-09 | End: 2021-07-06 | Stop reason: SDUPTHER

## 2021-05-06 RX ORDER — MORPHINE SULFATE 15 MG/1
15 TABLET, FILM COATED, EXTENDED RELEASE ORAL 3 TIMES DAILY
Qty: 90 TABLET | Refills: 0 | Status: SHIPPED | OUTPATIENT
Start: 2021-05-10 | End: 2021-07-06 | Stop reason: SDUPTHER
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
## 2021-05-19 ENCOUNTER — HOSPITAL ENCOUNTER (OUTPATIENT)
Dept: NUCLEAR MEDICINE | Facility: HOSPITAL | Age: 71
Discharge: HOME | End: 2021-05-19

## 2021-05-19 DIAGNOSIS — I25.10 CORONARY ARTERY DISEASE INVOLVING NATIVE CORONARY ARTERY OF NATIVE HEART WITHOUT ANGINA PECTORIS: ICD-10-CM

## 2021-05-19 DIAGNOSIS — I10 ESSENTIAL HYPERTENSION: ICD-10-CM

## 2021-05-19 DIAGNOSIS — I25.10 CHRONIC CORONARY ARTERY DISEASE: ICD-10-CM

## 2021-05-19 PROCEDURE — 93018 CV STRESS TEST I&R ONLY: CPT | Performed by: INTERNAL MEDICINE

## 2021-05-19 PROCEDURE — 0 TECHNETIUM SESTAMIBI: Performed by: INTERNAL MEDICINE

## 2021-05-19 PROCEDURE — 78452 HT MUSCLE IMAGE SPECT MULT: CPT

## 2021-05-19 PROCEDURE — 93016 CV STRESS TEST SUPVJ ONLY: CPT | Performed by: INTERNAL MEDICINE

## 2021-05-19 PROCEDURE — A9500 TC99M SESTAMIBI: HCPCS | Performed by: INTERNAL MEDICINE

## 2021-05-19 PROCEDURE — 25010000002 REGADENOSON 0.4 MG/5ML SOLUTION: Performed by: INTERNAL MEDICINE

## 2021-05-19 PROCEDURE — 78452 HT MUSCLE IMAGE SPECT MULT: CPT | Performed by: INTERNAL MEDICINE

## 2021-05-19 PROCEDURE — 93017 CV STRESS TEST TRACING ONLY: CPT

## 2021-05-19 RX ADMIN — TECHNETIUM TC 99M SESTAMIBI 1 DOSE: 1 INJECTION INTRAVENOUS at 10:04

## 2021-05-19 RX ADMIN — REGADENOSON 0.4 MG: 0.08 INJECTION, SOLUTION INTRAVENOUS at 11:40

## 2021-05-19 RX ADMIN — TECHNETIUM TC 99M SESTAMIBI 1 DOSE: 1 INJECTION INTRAVENOUS at 11:40

## 2021-05-20 LAB
BH CV NUCLEAR PRIOR STUDY: 3
BH CV REST NUCLEAR ISOTOPE DOSE: 7.9 MCI
BH CV STRESS BP STAGE 1: NORMAL
BH CV STRESS BP STAGE 2: NORMAL
BH CV STRESS BP STAGE 3: NORMAL
BH CV STRESS BP STAGE 4: NORMAL
BH CV STRESS COMMENTS STAGE 1: NORMAL
BH CV STRESS COMMENTS STAGE 2: NORMAL
BH CV STRESS DOSE REGADENOSON STAGE 1: 0.4
BH CV STRESS DURATION MIN STAGE 1: 0
BH CV STRESS DURATION MIN STAGE 2: 4
BH CV STRESS DURATION SEC STAGE 1: 10
BH CV STRESS DURATION SEC STAGE 2: 0
BH CV STRESS HR STAGE 1: 70
BH CV STRESS HR STAGE 2: 78
BH CV STRESS HR STAGE 3: 82
BH CV STRESS HR STAGE 4: 82
BH CV STRESS NUCLEAR ISOTOPE DOSE: 22 MCI
BH CV STRESS PROTOCOL 1: NORMAL
BH CV STRESS RECOVERY BP: NORMAL MMHG
BH CV STRESS RECOVERY HR: 77 BPM
BH CV STRESS STAGE 1: 1
BH CV STRESS STAGE 2: 2
BH CV STRESS STAGE 3: 3
BH CV STRESS STAGE 4: 4
BH CV STRESS STAGE 5: 5
LV EF NUC BP: 75 %
MAXIMAL PREDICTED HEART RATE: 149 BPM
PERCENT MAX PREDICTED HR: 55.03 %
STRESS BASELINE BP: NORMAL MMHG
STRESS BASELINE HR: 65 BPM
STRESS PERCENT HR: 65 %
STRESS POST PEAK BP: NORMAL MMHG
STRESS POST PEAK HR: 82 BPM
STRESS TARGET HR: 127 BPM

## 2021-05-25 ENCOUNTER — TELEPHONE (OUTPATIENT)
Dept: CARDIOLOGY | Facility: CLINIC | Age: 71
End: 2021-05-25

## 2021-05-25 NOTE — TELEPHONE ENCOUNTER
Called and informed the Pt per Dr. Grossman that his stress test looks okay. Pt stated understanding.

## 2021-05-27 RX ORDER — ATENOLOL 50 MG/1
TABLET ORAL
Qty: 30 TABLET | Refills: 0 | Status: SHIPPED | OUTPATIENT
Start: 2021-05-27 | End: 2021-06-24

## 2021-05-27 RX ORDER — PENTOXIFYLLINE 400 MG/1
TABLET, EXTENDED RELEASE ORAL
Qty: 90 TABLET | Refills: 0 | Status: SHIPPED | OUTPATIENT
Start: 2021-05-27 | End: 2021-06-24

## 2021-05-27 RX ORDER — CYANOCOBALAMIN 1000 UG/ML
INJECTION, SOLUTION INTRAMUSCULAR; SUBCUTANEOUS
Qty: 1 ML | Refills: 0 | Status: SHIPPED | OUTPATIENT
Start: 2021-05-27 | End: 2021-06-24

## 2021-05-27 RX ORDER — BACLOFEN 10 MG/1
TABLET ORAL
Qty: 90 TABLET | Refills: 0 | Status: SHIPPED | OUTPATIENT
Start: 2021-05-27 | End: 2021-06-24

## 2021-05-27 RX ORDER — GLIPIZIDE 5 MG/1
TABLET ORAL
Qty: 30 TABLET | Refills: 0 | Status: SHIPPED | OUTPATIENT
Start: 2021-05-27 | End: 2021-06-24

## 2021-06-24 RX ORDER — PENTOXIFYLLINE 400 MG/1
TABLET, EXTENDED RELEASE ORAL
Qty: 90 TABLET | Refills: 0 | Status: SHIPPED | OUTPATIENT
Start: 2021-06-24 | End: 2021-07-16

## 2021-06-24 RX ORDER — CYANOCOBALAMIN 1000 UG/ML
INJECTION, SOLUTION INTRAMUSCULAR; SUBCUTANEOUS
Qty: 1 ML | Refills: 0 | Status: SHIPPED | OUTPATIENT
Start: 2021-06-24 | End: 2021-07-16

## 2021-06-24 RX ORDER — BACLOFEN 10 MG/1
TABLET ORAL
Qty: 90 TABLET | Refills: 0 | Status: SHIPPED | OUTPATIENT
Start: 2021-06-24 | End: 2021-07-16

## 2021-06-24 RX ORDER — GLIPIZIDE 5 MG/1
TABLET ORAL
Qty: 30 TABLET | Refills: 0 | Status: SHIPPED | OUTPATIENT
Start: 2021-06-24 | End: 2021-07-16

## 2021-06-24 RX ORDER — ATENOLOL 50 MG/1
TABLET ORAL
Qty: 30 TABLET | Refills: 0 | Status: SHIPPED | OUTPATIENT
Start: 2021-06-24 | End: 2021-07-16

## 2021-07-01 ENCOUNTER — OFFICE VISIT (OUTPATIENT)
Dept: FAMILY MEDICINE CLINIC | Facility: CLINIC | Age: 71
End: 2021-07-01

## 2021-07-01 VITALS
DIASTOLIC BLOOD PRESSURE: 84 MMHG | SYSTOLIC BLOOD PRESSURE: 136 MMHG | BODY MASS INDEX: 29.25 KG/M2 | RESPIRATION RATE: 16 BRPM | WEIGHT: 193 LBS | HEIGHT: 68 IN | OXYGEN SATURATION: 99 % | HEART RATE: 60 BPM | TEMPERATURE: 97.9 F

## 2021-07-01 DIAGNOSIS — I25.10 CHRONIC CORONARY ARTERY DISEASE: ICD-10-CM

## 2021-07-01 DIAGNOSIS — N18.31 STAGE 3A CHRONIC KIDNEY DISEASE (HCC): ICD-10-CM

## 2021-07-01 DIAGNOSIS — E78.2 MIXED HYPERLIPIDEMIA: ICD-10-CM

## 2021-07-01 DIAGNOSIS — I10 ESSENTIAL HYPERTENSION: ICD-10-CM

## 2021-07-01 DIAGNOSIS — E11.65 TYPE 2 DIABETES MELLITUS WITH HYPERGLYCEMIA, WITHOUT LONG-TERM CURRENT USE OF INSULIN (HCC): Primary | ICD-10-CM

## 2021-07-01 PROBLEM — S22.008A CLOSED FRACTURE OF THORACIC VERTEBRA WITHOUT SPINAL CORD INJURY (HCC): Status: ACTIVE | Noted: 2021-07-01

## 2021-07-01 PROBLEM — M47.817 LUMBOSACRAL SPONDYLOSIS WITHOUT MYELOPATHY: Status: ACTIVE | Noted: 2021-07-01

## 2021-07-01 LAB
BILIRUB BLD-MCNC: NEGATIVE MG/DL
CLARITY, POC: CLEAR
COLOR UR: ABNORMAL
GLUCOSE UR STRIP-MCNC: ABNORMAL MG/DL
HBA1C MFR BLD: 7.5 %
KETONES UR QL: NEGATIVE
LEUKOCYTE EST, POC: NEGATIVE
NITRITE UR-MCNC: NEGATIVE MG/ML
PH UR: 5.5 [PH] (ref 5–8)
PROT UR STRIP-MCNC: ABNORMAL MG/DL
RBC # UR STRIP: ABNORMAL /UL
SP GR UR: 1.03 (ref 1–1.03)
UROBILINOGEN UR QL: NORMAL

## 2021-07-01 PROCEDURE — 83036 HEMOGLOBIN GLYCOSYLATED A1C: CPT | Performed by: FAMILY MEDICINE

## 2021-07-01 PROCEDURE — 81003 URINALYSIS AUTO W/O SCOPE: CPT | Performed by: FAMILY MEDICINE

## 2021-07-01 PROCEDURE — 99214 OFFICE O/P EST MOD 30 MIN: CPT | Performed by: FAMILY MEDICINE

## 2021-07-01 NOTE — PROGRESS NOTES
"Diabetes    Subjective    History of Present Illness      Merlin Trujillo presents to [facility] for   Follow-up of diabetes, heart disease, and hypertension.  He is doing well with no hypoglycemic episodes.    See review of systems below. Pertinent past medical history includes hypertension, heart disease, chronic kidney disease, diabetes, and chronic back pain followed by pain management.       PHQ-2 Total Score:    PHQ-9 Total Score:    Review of Systems   Constitutional: Negative for chills, fatigue and fever.   Respiratory: Negative for cough and shortness of breath.    Cardiovascular: Negative for chest pain and palpitations.   Gastrointestinal: Negative for constipation, diarrhea, nausea and vomiting.   Endocrine: Negative for polydipsia, polyphagia and polyuria.   Musculoskeletal: Positive for arthralgias, back pain, myalgias, neck pain and neck stiffness.   Skin: Negative for rash.   Neurological: Negative for dizziness and headaches.       Objective   Vital Signs:   /84   Pulse 60   Temp 97.9 °F (36.6 °C) (Infrared)   Resp 16   Ht 172.7 cm (68\")   Wt 87.5 kg (193 lb)   SpO2 99%   BMI 29.35 kg/m²     Physical Exam  Constitutional:       General: He is not in acute distress.     Appearance: He is well-developed.   Cardiovascular:      Rate and Rhythm: Normal rate and regular rhythm.      Pulses:           Dorsalis pedis pulses are 2+ on the right side and 2+ on the left side.        Posterior tibial pulses are 2+ on the right side and 2+ on the left side.      Heart sounds: Normal heart sounds. No murmur heard.     Pulmonary:      Effort: Pulmonary effort is normal.      Breath sounds: Normal breath sounds. No wheezing or rales.   Abdominal:      General: Bowel sounds are normal. There is no distension.      Palpations: Abdomen is soft.   Musculoskeletal:         General: Normal range of motion.      Right foot: Normal range of motion. No deformity, bunion, Charcot foot or foot " drop.      Left foot: Normal range of motion. No deformity, bunion, Charcot foot or foot drop.   Feet:      Right foot:      Protective Sensation: 8 sites tested. 8 sites sensed.      Skin integrity: Skin integrity normal. No ulcer, blister, skin breakdown or callus.      Toenail Condition: Right toenails are normal.      Left foot:      Protective Sensation: 8 sites tested. 8 sites sensed.      Skin integrity: Skin integrity normal. No ulcer, blister, skin breakdown or callus.      Toenail Condition: Left toenails are normal.   Skin:     General: Skin is warm and dry.   Neurological:      Mental Status: He is alert and oriented to person, place, and time.   Psychiatric:         Behavior: Behavior normal.        Result Review :     CMP    CMP 3/1/21   Glucose 143 (A)   BUN 24   Creatinine 1.14   eGFR Non  Am 64   eGFR  Am 74   Sodium 144   Potassium 4.5   Chloride 107 (A)   Calcium 9.3   Total Protein 7.4   Albumin 4.4   Globulin 3.0   Total Bilirubin 0.6   Alkaline Phosphatase 113   AST (SGOT) 13   ALT (SGPT) 17   (A) Abnormal value            CBC    CBC 3/1/21   WBC 7.6   RBC 4.56   Hemoglobin 13.5   Hematocrit 39.0   MCV 86   MCH 29.6   MCHC 34.6   RDW 12.8   Platelets 266           Lipid Panel    Lipid Panel 3/1/21   Total Cholesterol 133   Triglycerides 172 (A)   HDL Cholesterol 44   VLDL Cholesterol 29   LDL Cholesterol  60   (A) Abnormal value                      Hemoglobin A1C (%)   Date Value   07/01/2021 7.5       Diagnoses and all orders for this visit:    1. Type 2 diabetes mellitus with hyperglycemia, without long-term current use of insulin (CMS/[facility]) (Primary)  Assessment & Plan:  Stable but not at goal.  Increase activity and limit carbohydrates and recheck in 4 months.  Eye exam sometime this year.    Orders:  -     POC Urinalysis Dipstick, Automated  -     POC Glycosylated Hemoglobin (Hb A1C)    2. Essential hypertension  Assessment & Plan:  Stable.  Continue beta-blocker and  aspirin.      3. Mixed hyperlipidemia  Assessment & Plan:  Continue high potency statin therapy.      4. Stage 3a chronic kidney disease (CMS/HCC)  Assessment & Plan:  Blood work reviewed.  Limit NSAIDs.      5. Chronic coronary artery disease  Assessment & Plan:  Doing fairly well.  Continue aggressive control of diabetes and hypertension.          Follow Up   Return in about 4 months (around 11/1/2021) for Medicare Wellness, Recheck diabetes.  Patient was given instructions and counseling regarding his condition or for health maintenance advice. Please see specific information pulled into the AVS if appropriate.     Reggie Duane Lyell, MD  7/1/2021  This note was electronically signed.

## 2021-07-01 NOTE — ASSESSMENT & PLAN NOTE
Stable but not at goal.  Increase activity and limit carbohydrates and recheck in 4 months.  Eye exam sometime this year.

## 2021-07-06 ENCOUNTER — OFFICE VISIT (OUTPATIENT)
Dept: PAIN MEDICINE | Facility: CLINIC | Age: 71
End: 2021-07-06

## 2021-07-06 VITALS — BODY MASS INDEX: 29.25 KG/M2 | WEIGHT: 193 LBS | HEIGHT: 68 IN

## 2021-07-06 DIAGNOSIS — M79.2 NEUROPATHIC PAIN: ICD-10-CM

## 2021-07-06 DIAGNOSIS — M25.50 POLYARTHRALGIA: ICD-10-CM

## 2021-07-06 DIAGNOSIS — Z79.899 HIGH RISK MEDICATION USE: ICD-10-CM

## 2021-07-06 DIAGNOSIS — G89.4 CHRONIC PAIN SYNDROME: Primary | ICD-10-CM

## 2021-07-06 DIAGNOSIS — M47.812 CERVICAL SPONDYLOSIS WITHOUT MYELOPATHY: ICD-10-CM

## 2021-07-06 PROCEDURE — 99443 PR PHYS/QHP TELEPHONE EVALUATION 21-30 MIN: CPT | Performed by: ANESTHESIOLOGY

## 2021-07-06 RX ORDER — MORPHINE SULFATE 15 MG/1
15 TABLET, FILM COATED, EXTENDED RELEASE ORAL 3 TIMES DAILY
Qty: 90 TABLET | Refills: 0 | Status: SHIPPED | OUTPATIENT
Start: 2021-07-06 | End: 2021-09-01 | Stop reason: SDUPTHER

## 2021-07-06 RX ORDER — MORPHINE SULFATE 15 MG/1
15 TABLET, FILM COATED, EXTENDED RELEASE ORAL 3 TIMES DAILY PRN
Qty: 90 TABLET | Refills: 0 | Status: SHIPPED | OUTPATIENT
Start: 2021-08-05 | End: 2021-09-01 | Stop reason: SDUPTHER
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
## 2021-07-16 RX ORDER — ATENOLOL 50 MG/1
TABLET ORAL
Qty: 30 TABLET | Refills: 0 | Status: SHIPPED | OUTPATIENT
Start: 2021-07-16 | End: 2021-08-13

## 2021-07-16 RX ORDER — CYANOCOBALAMIN 1000 UG/ML
INJECTION, SOLUTION INTRAMUSCULAR; SUBCUTANEOUS
Qty: 1 ML | Refills: 0 | Status: SHIPPED | OUTPATIENT
Start: 2021-07-16 | End: 2021-08-13

## 2021-07-16 RX ORDER — PENTOXIFYLLINE 400 MG/1
TABLET, EXTENDED RELEASE ORAL
Qty: 90 TABLET | Refills: 0 | Status: SHIPPED | OUTPATIENT
Start: 2021-07-16 | End: 2021-08-13

## 2021-07-16 RX ORDER — BACLOFEN 10 MG/1
TABLET ORAL
Qty: 90 TABLET | Refills: 0 | Status: SHIPPED | OUTPATIENT
Start: 2021-07-16 | End: 2021-08-13

## 2021-07-16 RX ORDER — GLIPIZIDE 5 MG/1
TABLET ORAL
Qty: 30 TABLET | Refills: 0 | Status: SHIPPED | OUTPATIENT
Start: 2021-07-16 | End: 2021-08-13

## 2021-08-13 RX ORDER — BACLOFEN 10 MG/1
TABLET ORAL
Qty: 90 TABLET | Refills: 0 | Status: SHIPPED | OUTPATIENT
Start: 2021-08-13 | End: 2021-09-11

## 2021-08-13 RX ORDER — ATENOLOL 50 MG/1
TABLET ORAL
Qty: 30 TABLET | Refills: 0 | Status: SHIPPED | OUTPATIENT
Start: 2021-08-13 | End: 2021-09-11

## 2021-08-13 RX ORDER — CYANOCOBALAMIN 1000 UG/ML
INJECTION, SOLUTION INTRAMUSCULAR; SUBCUTANEOUS
Qty: 1 ML | Refills: 0 | Status: SHIPPED | OUTPATIENT
Start: 2021-08-13 | End: 2021-09-11

## 2021-08-13 RX ORDER — ATORVASTATIN CALCIUM 40 MG/1
TABLET, FILM COATED ORAL
Qty: 30 TABLET | Refills: 3 | Status: SHIPPED | OUTPATIENT
Start: 2021-08-13 | End: 2021-12-13

## 2021-08-13 RX ORDER — PENTOXIFYLLINE 400 MG/1
TABLET, EXTENDED RELEASE ORAL
Qty: 90 TABLET | Refills: 0 | Status: SHIPPED | OUTPATIENT
Start: 2021-08-13 | End: 2021-09-11

## 2021-08-13 RX ORDER — GLIPIZIDE 5 MG/1
TABLET ORAL
Qty: 30 TABLET | Refills: 0 | Status: SHIPPED | OUTPATIENT
Start: 2021-08-13 | End: 2021-09-11

## 2021-09-01 ENCOUNTER — OFFICE VISIT (OUTPATIENT)
Dept: PAIN MEDICINE | Facility: CLINIC | Age: 71
End: 2021-09-01

## 2021-09-01 VITALS
HEIGHT: 68 IN | WEIGHT: 193 LBS | BODY MASS INDEX: 29.25 KG/M2 | SYSTOLIC BLOOD PRESSURE: 145 MMHG | DIASTOLIC BLOOD PRESSURE: 78 MMHG | HEART RATE: 74 BPM | RESPIRATION RATE: 16 BRPM | OXYGEN SATURATION: 95 %

## 2021-09-01 DIAGNOSIS — G89.4 CHRONIC PAIN SYNDROME: Primary | ICD-10-CM

## 2021-09-01 DIAGNOSIS — Z79.899 HIGH RISK MEDICATION USE: Primary | ICD-10-CM

## 2021-09-01 DIAGNOSIS — Z79.899 HIGH RISK MEDICATION USE: ICD-10-CM

## 2021-09-01 DIAGNOSIS — M47.812 CERVICAL SPONDYLOSIS WITHOUT MYELOPATHY: ICD-10-CM

## 2021-09-01 DIAGNOSIS — M25.50 POLYARTHRALGIA: ICD-10-CM

## 2021-09-01 DIAGNOSIS — M79.2 NEUROPATHIC PAIN: ICD-10-CM

## 2021-09-01 PROCEDURE — 99214 OFFICE O/P EST MOD 30 MIN: CPT | Performed by: ANESTHESIOLOGY

## 2021-09-01 RX ORDER — MORPHINE SULFATE 15 MG/1
15 TABLET, FILM COATED, EXTENDED RELEASE ORAL 3 TIMES DAILY PRN
Qty: 90 TABLET | Refills: 0 | Status: SHIPPED | OUTPATIENT
Start: 2021-10-04 | End: 2021-11-02 | Stop reason: SDUPTHER

## 2021-09-01 RX ORDER — MORPHINE SULFATE 15 MG/1
15 TABLET, FILM COATED, EXTENDED RELEASE ORAL 3 TIMES DAILY
Qty: 90 TABLET | Refills: 0 | Status: SHIPPED | OUTPATIENT
Start: 2021-09-05 | End: 2021-11-02 | Stop reason: SDUPTHER
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
## 2021-09-11 RX ORDER — GLIPIZIDE 5 MG/1
TABLET ORAL
Qty: 30 TABLET | Refills: 0 | Status: SHIPPED | OUTPATIENT
Start: 2021-09-11 | End: 2021-10-10

## 2021-09-11 RX ORDER — PENTOXIFYLLINE 400 MG/1
TABLET, EXTENDED RELEASE ORAL
Qty: 90 TABLET | Refills: 0 | Status: SHIPPED | OUTPATIENT
Start: 2021-09-11 | End: 2021-10-10

## 2021-09-11 RX ORDER — ATENOLOL 50 MG/1
TABLET ORAL
Qty: 30 TABLET | Refills: 0 | Status: SHIPPED | OUTPATIENT
Start: 2021-09-11 | End: 2021-10-10

## 2021-09-11 RX ORDER — CYANOCOBALAMIN 1000 UG/ML
INJECTION, SOLUTION INTRAMUSCULAR; SUBCUTANEOUS
Qty: 1 ML | Refills: 0 | Status: SHIPPED | OUTPATIENT
Start: 2021-09-11 | End: 2021-10-10

## 2021-09-11 RX ORDER — BACLOFEN 10 MG/1
TABLET ORAL
Qty: 90 TABLET | Refills: 0 | Status: SHIPPED | OUTPATIENT
Start: 2021-09-11 | End: 2021-10-10

## 2021-09-16 RX ORDER — LUBIPROSTONE 24 UG/1
24 CAPSULE ORAL 2 TIMES DAILY
Qty: 60 CAPSULE | Refills: 11 | Status: SHIPPED | OUTPATIENT
Start: 2021-09-16 | End: 2022-03-03 | Stop reason: SDUPTHER

## 2021-10-10 RX ORDER — ATENOLOL 50 MG/1
TABLET ORAL
Qty: 30 TABLET | Refills: 0 | Status: SHIPPED | OUTPATIENT
Start: 2021-10-10 | End: 2021-11-05

## 2021-10-10 RX ORDER — CYANOCOBALAMIN 1000 UG/ML
INJECTION, SOLUTION INTRAMUSCULAR; SUBCUTANEOUS
Qty: 1 ML | Refills: 0 | Status: SHIPPED | OUTPATIENT
Start: 2021-10-10 | End: 2021-11-05

## 2021-10-10 RX ORDER — BACLOFEN 10 MG/1
TABLET ORAL
Qty: 90 TABLET | Refills: 0 | Status: SHIPPED | OUTPATIENT
Start: 2021-10-10 | End: 2021-11-05

## 2021-10-10 RX ORDER — GLIPIZIDE 5 MG/1
TABLET ORAL
Qty: 30 TABLET | Refills: 0 | Status: SHIPPED | OUTPATIENT
Start: 2021-10-10 | End: 2021-11-05

## 2021-10-10 RX ORDER — PENTOXIFYLLINE 400 MG/1
TABLET, EXTENDED RELEASE ORAL
Qty: 90 TABLET | Refills: 0 | Status: SHIPPED | OUTPATIENT
Start: 2021-10-10 | End: 2021-11-05

## 2021-10-10 RX ORDER — AMLODIPINE BESYLATE 5 MG/1
TABLET ORAL
Qty: 180 TABLET | Refills: 1 | Status: SHIPPED | OUTPATIENT
Start: 2021-10-10 | End: 2022-03-03 | Stop reason: SDUPTHER

## 2021-10-27 ENCOUNTER — OFFICE VISIT (OUTPATIENT)
Dept: CARDIOLOGY | Facility: CLINIC | Age: 71
End: 2021-10-27

## 2021-10-27 VITALS
SYSTOLIC BLOOD PRESSURE: 160 MMHG | HEART RATE: 69 BPM | WEIGHT: 193 LBS | BODY MASS INDEX: 29.25 KG/M2 | RESPIRATION RATE: 18 BRPM | OXYGEN SATURATION: 99 % | DIASTOLIC BLOOD PRESSURE: 78 MMHG | HEIGHT: 68 IN

## 2021-10-27 DIAGNOSIS — E78.2 MIXED HYPERLIPIDEMIA: ICD-10-CM

## 2021-10-27 DIAGNOSIS — I25.10 CHRONIC CORONARY ARTERY DISEASE: ICD-10-CM

## 2021-10-27 DIAGNOSIS — I65.23 BILATERAL CAROTID ARTERY STENOSIS: Primary | ICD-10-CM

## 2021-10-27 DIAGNOSIS — I10 PRIMARY HYPERTENSION: ICD-10-CM

## 2021-10-27 PROCEDURE — 99214 OFFICE O/P EST MOD 30 MIN: CPT | Performed by: INTERNAL MEDICINE

## 2021-10-27 PROCEDURE — 93000 ELECTROCARDIOGRAM COMPLETE: CPT | Performed by: INTERNAL MEDICINE

## 2021-10-27 NOTE — PROGRESS NOTES
Cardiology Office Visit      Encounter Date:  10/27/2021    Patient ID:   Merlin Trujillo is a 71 y.o. male.    Reason For Followup:  Peripheral artery disease  Carotid stenosis  Hypertension  Hyperlipidemia    Brief Clinical History:  Dear Dr. Lyell, Reggie Duane, MD    I had the pleasure of seeing Merlin Trujillo today. As you are well aware, this is a 71 y.o. male with past medical history that is significant for history of hypertension hyperlipidemia  and peripheral arterial disease here for follow-up    Patient had symptoms of TIA with amaurosis fugax in the right eye.  Noted to have significant carotid stenosis in the right carotid artery  Status post a successful right carotid endarterectomy  Patient had recent hospitalization with urosepsis  Echocardiogram showed normal LV systolic function        Interval History:  Denies any chest pain  Patient is complaining of some nonspecific fatigue and weakness  No recent cardiac evaluation or work-up  Denies any dizziness or syncope    Interpretation Summary    · Myocardial perfusion imaging indicates a normal myocardial perfusion study with no evidence of ischemia.  · Left ventricular ejection fraction is hyperdynamic (Calculated EF > 70%). .  · Findings consistent with a normal ECG stress test.  · Impressions are consistent with a low risk study.  · There is no prior study available for comparison.    1. The patient has a history of prior right carotid surgery a few years  ago. The degree of narrowing is less than 50% by consensus panel  criteria in the right common carotid artery carotid bifurcation.  2. Mild to moderate plaque deposition left carotid bifurcation. The  degree of narrowing is less than 50% by consensus panel criteria.  3. Patent vertebral arteries bilaterally with antegrade flow.       Assessment & Plan    Impressions:     Hypertension   hyperlipidemia   peripheral arterial disease   presumed coronary artery disease but no evidence of any  "inducible ischemia on the stress test  Bilateral carotid stenosis less than 50% on carotid ultrasound that was done in March 2021  Stress test in May 2021 with normal LV systolic function normal wall motion estimated LV ejection fraction of 70% with no inducible ischemia    Recommendations:  Recent myocardial perfusion study that was normal   continued aggressive risk factor modification   regular exercise   labs with primary care physician office  Labs discussed with the patient  Recent labs reviewed and discussed with the patient  Medications reviewed and discussed with patient   follow up in office in 6 months    Objective:    Vitals:  Vitals:    10/27/21 1421   BP: 160/78   BP Location: Left arm   Patient Position: Sitting   Cuff Size: Large Adult   Pulse: 69   Resp: 18   SpO2: 99%   Weight: 87.5 kg (193 lb)   Height: 172.7 cm (68\")       Physical Exam:    General: Alert, cooperative, no distress, appears stated age  Head:  Normocephalic, atraumatic, mucous membranes moist  Eyes:  Conjunctiva/corneas clear, EOM's intact     Neck:  Supple,  no adenopathy;      Lungs: Clear to auscultation bilaterally, no wheezes rhonchi rales are noted  Chest wall: No tenderness  Heart::  Regular rate and rhythm, S1 and S2 normal, no murmur, rub or gallop  Abdomen: Soft, non-tender, nondistended bowel sounds active  Extremities: No cyanosis, clubbing, or edema  Pulses: 2+ and symmetric all extremities  Skin:  No rashes or lesions  Neuro/psych: A&O x3. CN II through XII are grossly intact with appropriate affect      Allergies:  No Known Allergies    Medication Review:     Current Outpatient Medications:   •  ACCU-CHEK FASTCLIX LANCETS misc, TEST EVERY DAY AS DIRECTED, Disp: 100 each, Rfl: 5  •  Accu-Chek Guide test strip, TEST ONCE DAILY AS DIRECTED, Disp: 100 each, Rfl: 3  •  amLODIPine (NORVASC) 5 MG tablet, TAKE ONE TABLET BY MOUTH TWICE DAILY, Disp: 180 tablet, Rfl: 1  •  aspirin 81 MG EC tablet, Take 81 mg by mouth Daily., " "Disp: , Rfl:   •  atenolol (TENORMIN) 50 MG tablet, TAKE 1/2 TABLET BY MOUTH TWICE DAILY, Disp: 30 tablet, Rfl: 0  •  atorvastatin (LIPITOR) 40 MG tablet, TAKE ONE TABLET BY MOUTH EVERY NIGHT AT BEDTIME, Disp: 30 tablet, Rfl: 3  •  B-D 3CC LUER-RONY SYR 25GX1\" 25G X 1\" 3 ML misc, USE AS DIRECTED WITH B-12 INJECTION, Disp: 100 each, Rfl: 4  •  baclofen (LIORESAL) 10 MG tablet, TAKE ONE TABLET BY MOUTH THREE TIMES DAILY, Disp: 90 tablet, Rfl: 0  •  cyanocobalamin 1000 MCG/ML injection, INJECT 1ML MONTHLY, Disp: 1 mL, Rfl: 0  •  Gabapentin Enacarbil ER (Horizant) 600 MG tablet controlled-release, Take 600 mg by mouth 3 (Three) Times a Day., Disp: 90 tablet, Rfl: 11  •  glipizide (GLUCOTROL) 5 MG tablet, TAKE ONE TABLET BY MOUTH EVERY DAY, Disp: 30 tablet, Rfl: 0  •  lubiprostone (Amitiza) 24 MCG capsule, Take 1 capsule by mouth 2 (Two) Times a Day., Disp: 60 capsule, Rfl: 11  •  Morphine (MS CONTIN) 15 MG 12 hr tablet, Take 1 tablet by mouth 3 (Three) Times a Day As Needed for Severe Pain . DNF before 10/4/2021, Disp: 90 tablet, Rfl: 0  •  Morphine (MS CONTIN) 15 MG 12 hr tablet, Take 1 tablet by mouth 3 (Three) Times a Day., Disp: 90 tablet, Rfl: 0  •  pentoxifylline (TRENtal) 400 MG CR tablet, TAKE ONE TABLET BY MOUTH THREE TIMES DAILY, Disp: 90 tablet, Rfl: 0    Family History:  Family History   Problem Relation Age of Onset   • Dementia Mother    • Heart disease Mother    • COPD Father    • Stroke Father    • Heart disease Father    • Hypertension Sister    • Diabetes Sister    • Hypertension Brother        Past Medical History:  Past Medical History:   Diagnosis Date   • Adenomatous polyps    • Amaurosis fugax 3/27/2017   • Arm pain     left into hand   • Arm pain    • Blood in urine     3/2020   • BPH (benign prostatic hyperplasia)    • CAD (coronary artery disease)    • CKD (chronic kidney disease) stage 3, GFR 30-59 ml/min ([facility])    • CVA, old, speech/language deficit    • Diabetes (HCC)    • Diabetic acetonemia " ([facility])    • Hearing loss    • Hematuria    • Hernia, inguinal    • Hyperlipidemia    • Hypertension    • Kidney stones    • Low back pain    • Neuropathic pain    • Obstructive uropathy    • Shoulder pain    • Shoulder pain    • Sleep disorder    • Spondylosis, cervical    • Urgency of urination        Past surgical History:  Past Surgical History:   Procedure Laterality Date   • ANKLE SURGERY      replacement  rt   • CAROTID ARTERY ANGIOPLASTY     • CARPAL TUNNEL RELEASE     • COLONOSCOPY     • CYSTOSCOPY URETEROSCOPY LASER LITHOTRIPSY      kidney stones   • EYE SURGERY      mesh  and plate under rt eye due to orbital fx   • NOSE SURGERY      x2   • OTHER SURGICAL HISTORY      uro  lift  1 /2021   • TEETH EXTRACTION      dentures       Social History:  Social History     Socioeconomic History   • Marital status: Significant Other   Tobacco Use   • Smoking status: Never Smoker   • Smokeless tobacco: Never Used   Vaping Use   • Vaping Use: Never used   Substance and Sexual Activity   • Alcohol use: No   • Drug use: Never   • Sexual activity: Defer       Review of Systems:  The following systems were reviewed as they relate to the cardiovascular system: Constitutional, Eyes, ENT, Cardiovascular, Respiratory, Gastrointestinal, Integumentary, Neurological, Psychiatric, Hematologic, Endocrine, Musculoskeletal, and Genitourinary. The pertinent cardiovascular findings are reported above with all other cardiovascular points within those systems being negative.    Diagnostic Study Review:     Current Electrocardiogram:    ECG 12 Lead    Date/Time: 10/27/2021 2:32 PM  Performed by: Yury Iraheta MD  Authorized by: Yury Iraheta MD   Comparison: compared with previous ECG   Similar to previous ECG  Rhythm: sinus rhythm  Rate: normal  BPM: 74  Conduction: conduction normal  QRS axis: normal  Other findings: non-specific ST-T wave changes    Clinical impression: abnormal EKG  Comments: Inferior Q waves  involving leads III and aVF              NOTE: The following portions of the patient's history were reviewed and updated this visit as appropriate: allergies, current medications, past family history, past medical history, past social history, past surgical history and problem list.

## 2021-11-01 ENCOUNTER — OFFICE VISIT (OUTPATIENT)
Dept: FAMILY MEDICINE CLINIC | Facility: CLINIC | Age: 71
End: 2021-11-01

## 2021-11-01 VITALS
TEMPERATURE: 98.5 F | HEART RATE: 60 BPM | HEIGHT: 68 IN | DIASTOLIC BLOOD PRESSURE: 71 MMHG | OXYGEN SATURATION: 98 % | RESPIRATION RATE: 18 BRPM | BODY MASS INDEX: 29.7 KG/M2 | WEIGHT: 196 LBS | SYSTOLIC BLOOD PRESSURE: 135 MMHG

## 2021-11-01 DIAGNOSIS — E11.65 TYPE 2 DIABETES MELLITUS WITH HYPERGLYCEMIA, WITHOUT LONG-TERM CURRENT USE OF INSULIN (HCC): Primary | ICD-10-CM

## 2021-11-01 DIAGNOSIS — E78.2 MIXED HYPERLIPIDEMIA: ICD-10-CM

## 2021-11-01 DIAGNOSIS — I25.10 CHRONIC CORONARY ARTERY DISEASE: ICD-10-CM

## 2021-11-01 DIAGNOSIS — I10 PRIMARY HYPERTENSION: ICD-10-CM

## 2021-11-01 LAB
EXPIRATION DATE: NORMAL
HBA1C MFR BLD: 7.8 %
Lab: NORMAL

## 2021-11-01 PROCEDURE — 83036 HEMOGLOBIN GLYCOSYLATED A1C: CPT | Performed by: FAMILY MEDICINE

## 2021-11-01 PROCEDURE — 3051F HG A1C>EQUAL 7.0%<8.0%: CPT | Performed by: FAMILY MEDICINE

## 2021-11-01 PROCEDURE — 99214 OFFICE O/P EST MOD 30 MIN: CPT | Performed by: FAMILY MEDICINE

## 2021-11-01 RX ORDER — LANCETS
EACH MISCELLANEOUS
Qty: 102 EACH | Refills: 0 | Status: SHIPPED | OUTPATIENT
Start: 2021-11-01 | End: 2022-09-30 | Stop reason: SDUPTHER

## 2021-11-01 NOTE — PROGRESS NOTES
"Diabetes    Subjective    History of Present Illness      Merlin Trujillo presents to [facility] for   Recheck of diabetes.  Patient is doing fairly well with no hypoglycemic episodes.  He is saw his cardiologist last week.  He sees pain management tomorrow.  He has not had any chest pain or palpitations.       PHQ-2 Total Score:    PHQ-9 Total Score:    Review of Systems   Constitutional: Negative for chills, fatigue and fever.   Respiratory: Negative for cough and shortness of breath.    Cardiovascular: Negative for chest pain and palpitations.   Gastrointestinal: Negative for constipation, diarrhea, nausea and vomiting.   Endocrine: Negative for polydipsia, polyphagia and polyuria.   Musculoskeletal: Positive for arthralgias, back pain, myalgias, neck pain and neck stiffness.   Skin: Negative for rash.   Neurological: Negative for dizziness and headaches.       Objective   Vital Signs:   /71   Pulse 60   Temp 98.5 °F (36.9 °C) (Infrared)   Resp 18   Ht 172.7 cm (68\")   Wt 88.9 kg (196 lb)   SpO2 98%   BMI 29.80 kg/m²     Physical Exam  Constitutional:       General: He is not in acute distress.     Appearance: He is well-developed.   Cardiovascular:      Rate and Rhythm: Normal rate and regular rhythm.      Pulses:           Dorsalis pedis pulses are 2+ on the right side and 2+ on the left side.        Posterior tibial pulses are 2+ on the right side and 2+ on the left side.      Heart sounds: Normal heart sounds. No murmur heard.      Pulmonary:      Effort: Pulmonary effort is normal.      Breath sounds: Normal breath sounds. No wheezing or rales.   Abdominal:      General: Bowel sounds are normal. There is no distension.      Palpations: Abdomen is soft.   Musculoskeletal:         General: Normal range of motion.      Right foot: Normal range of motion. No deformity, bunion, Charcot foot or foot drop.      Left foot: Normal range of motion. No deformity, bunion, Charcot foot or foot " drop.   Feet:      Right foot:      Protective Sensation: 8 sites tested. 8 sites sensed.      Skin integrity: Skin integrity normal. No ulcer, blister, skin breakdown or callus.      Toenail Condition: Right toenails are normal.      Left foot:      Protective Sensation: 8 sites tested. 8 sites sensed.      Skin integrity: Skin integrity normal. No ulcer, blister, skin breakdown or callus.      Toenail Condition: Left toenails are normal.   Skin:     General: Skin is warm and dry.   Neurological:      Mental Status: He is alert and oriented to person, place, and time.   Psychiatric:         Behavior: Behavior normal.        Result Review :                 Hemoglobin A1C (%)   Date Value   11/01/2021 7.8       Diagnoses and all orders for this visit:    1. Type 2 diabetes mellitus with hyperglycemia, without long-term current use of insulin (HCC) (Primary)  Assessment & Plan:  A1c is gone up to 7.8%.  We will add Metformin and recheck in 3 to 4 months.  Signs and symptoms of hypoglycemia discussed.  Daily foot exams and yearly eye exams encouraged.    Orders:  -     POC Glycosylated Hemoglobin (Hb A1C)    2. Primary hypertension  Assessment & Plan:  Stable.  Continue beta-blocker and amlodipine.      3. Chronic coronary artery disease  Assessment & Plan:  Stable.  Continue aspirin and try to get better diabetes control.  Continue beta-blocker and amlodipine.      4. Mixed hyperlipidemia  Assessment & Plan:  Continue high potency statin therapy using atorvastatin 40 mg daily.      Other orders  -     metFORMIN (GLUCOPHAGE) 500 MG tablet; Take 1 tablet by mouth 2 (Two) Times a Day With Meals.  Dispense: 180 tablet; Refill: 1  -     ECG 12 Lead        Follow Up   Return in about 4 months (around 3/1/2022) for Recheck diabetes.  Patient was given instructions and counseling regarding his condition or for health maintenance advice. Please see specific information pulled into the AVS if appropriate.     Reggie Duane  MD [place]  11/1/2021  This note was electronically signed.

## 2021-11-01 NOTE — ASSESSMENT & PLAN NOTE
A1c is gone up to 7.8%.  We will add Metformin and recheck in 3 to 4 months.  Signs and symptoms of hypoglycemia discussed.  Daily foot exams and yearly eye exams encouraged.

## 2021-11-01 NOTE — ASSESSMENT & PLAN NOTE
Stable.  Continue aspirin and try to get better diabetes control.  Continue beta-blocker and amlodipine.

## 2021-11-02 ENCOUNTER — OFFICE VISIT (OUTPATIENT)
Dept: PAIN MEDICINE | Facility: CLINIC | Age: 71
End: 2021-11-02

## 2021-11-02 VITALS
SYSTOLIC BLOOD PRESSURE: 148 MMHG | BODY MASS INDEX: 29.7 KG/M2 | RESPIRATION RATE: 16 BRPM | OXYGEN SATURATION: 98 % | HEART RATE: 61 BPM | DIASTOLIC BLOOD PRESSURE: 74 MMHG | WEIGHT: 196 LBS | HEIGHT: 68 IN

## 2021-11-02 DIAGNOSIS — M25.50 POLYARTHRALGIA: ICD-10-CM

## 2021-11-02 DIAGNOSIS — G89.4 CHRONIC PAIN SYNDROME: Primary | ICD-10-CM

## 2021-11-02 DIAGNOSIS — Z79.899 HIGH RISK MEDICATION USE: ICD-10-CM

## 2021-11-02 DIAGNOSIS — M79.2 NEUROPATHIC PAIN: ICD-10-CM

## 2021-11-02 DIAGNOSIS — M47.812 CERVICAL SPONDYLOSIS WITHOUT MYELOPATHY: ICD-10-CM

## 2021-11-02 DIAGNOSIS — M47.817 LUMBOSACRAL SPONDYLOSIS WITHOUT MYELOPATHY: ICD-10-CM

## 2021-11-02 PROCEDURE — 99214 OFFICE O/P EST MOD 30 MIN: CPT | Performed by: ANESTHESIOLOGY

## 2021-11-02 RX ORDER — MORPHINE SULFATE 15 MG/1
15 TABLET, FILM COATED, EXTENDED RELEASE ORAL 3 TIMES DAILY
Qty: 90 TABLET | Refills: 0 | Status: SHIPPED | OUTPATIENT
Start: 2021-11-03 | End: 2022-01-04 | Stop reason: SDUPTHER

## 2021-11-02 RX ORDER — MORPHINE SULFATE 15 MG/1
15 TABLET, FILM COATED, EXTENDED RELEASE ORAL 3 TIMES DAILY PRN
Qty: 90 TABLET | Refills: 0 | Status: SHIPPED | OUTPATIENT
Start: 2021-12-03 | End: 2022-01-04 | Stop reason: SDUPTHER
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
## 2021-11-05 RX ORDER — ATENOLOL 50 MG/1
TABLET ORAL
Qty: 30 TABLET | Refills: 0 | Status: SHIPPED | OUTPATIENT
Start: 2021-11-05 | End: 2021-12-13

## 2021-11-05 RX ORDER — SYRINGE WITH NEEDLE, 1 ML 25GX5/8"
SYRINGE, EMPTY DISPOSABLE MISCELLANEOUS
Qty: 100 EACH | Refills: 3 | Status: SHIPPED | OUTPATIENT
Start: 2021-11-05 | End: 2022-09-27 | Stop reason: SDUPTHER

## 2021-11-05 RX ORDER — BACLOFEN 10 MG/1
TABLET ORAL
Qty: 90 TABLET | Refills: 0 | Status: SHIPPED | OUTPATIENT
Start: 2021-11-05 | End: 2021-12-13

## 2021-11-05 RX ORDER — CYANOCOBALAMIN 1000 UG/ML
INJECTION, SOLUTION INTRAMUSCULAR; SUBCUTANEOUS
Qty: 1 ML | Refills: 0 | Status: SHIPPED | OUTPATIENT
Start: 2021-11-05 | End: 2021-12-13

## 2021-11-05 RX ORDER — PENTOXIFYLLINE 400 MG/1
TABLET, EXTENDED RELEASE ORAL
Qty: 90 TABLET | Refills: 0 | Status: SHIPPED | OUTPATIENT
Start: 2021-11-05 | End: 2021-12-13

## 2021-11-05 RX ORDER — GLIPIZIDE 5 MG/1
TABLET ORAL
Qty: 30 TABLET | Refills: 0 | Status: SHIPPED | OUTPATIENT
Start: 2021-11-05 | End: 2021-12-13

## 2021-12-13 RX ORDER — PENTOXIFYLLINE 400 MG/1
TABLET, EXTENDED RELEASE ORAL
Qty: 90 TABLET | Refills: 0 | Status: SHIPPED | OUTPATIENT
Start: 2021-12-13 | End: 2022-01-06

## 2021-12-13 RX ORDER — ATENOLOL 50 MG/1
TABLET ORAL
Qty: 30 TABLET | Refills: 0 | Status: SHIPPED | OUTPATIENT
Start: 2021-12-13 | End: 2022-01-06

## 2021-12-13 RX ORDER — BACLOFEN 10 MG/1
TABLET ORAL
Qty: 90 TABLET | Refills: 0 | Status: SHIPPED | OUTPATIENT
Start: 2021-12-13 | End: 2022-01-06

## 2021-12-13 RX ORDER — ATORVASTATIN CALCIUM 40 MG/1
TABLET, FILM COATED ORAL
Qty: 30 TABLET | Refills: 2 | Status: SHIPPED | OUTPATIENT
Start: 2021-12-13 | End: 2022-03-02

## 2021-12-13 RX ORDER — GLIPIZIDE 5 MG/1
TABLET ORAL
Qty: 30 TABLET | Refills: 0 | Status: SHIPPED | OUTPATIENT
Start: 2021-12-13 | End: 2022-01-06

## 2021-12-13 RX ORDER — CYANOCOBALAMIN 1000 UG/ML
INJECTION, SOLUTION INTRAMUSCULAR; SUBCUTANEOUS
Qty: 1 ML | Refills: 0 | Status: SHIPPED | OUTPATIENT
Start: 2021-12-13 | End: 2022-01-06

## 2022-01-04 ENCOUNTER — OFFICE VISIT (OUTPATIENT)
Dept: PAIN MEDICINE | Facility: CLINIC | Age: 72
End: 2022-01-04

## 2022-01-04 VITALS
DIASTOLIC BLOOD PRESSURE: 77 MMHG | BODY MASS INDEX: 29.7 KG/M2 | HEIGHT: 68 IN | SYSTOLIC BLOOD PRESSURE: 146 MMHG | RESPIRATION RATE: 16 BRPM | HEART RATE: 59 BPM | WEIGHT: 196 LBS | OXYGEN SATURATION: 96 %

## 2022-01-04 DIAGNOSIS — M25.50 POLYARTHRALGIA: ICD-10-CM

## 2022-01-04 DIAGNOSIS — M47.817 LUMBOSACRAL SPONDYLOSIS WITHOUT MYELOPATHY: ICD-10-CM

## 2022-01-04 DIAGNOSIS — M47.812 CERVICAL SPONDYLOSIS WITHOUT MYELOPATHY: ICD-10-CM

## 2022-01-04 DIAGNOSIS — G89.4 CHRONIC PAIN SYNDROME: Primary | ICD-10-CM

## 2022-01-04 DIAGNOSIS — Z79.899 HIGH RISK MEDICATION USE: ICD-10-CM

## 2022-01-04 DIAGNOSIS — M79.2 NEUROPATHIC PAIN: ICD-10-CM

## 2022-01-04 DIAGNOSIS — Z79.899 HIGH RISK MEDICATION USE: Primary | ICD-10-CM

## 2022-01-04 PROCEDURE — 99214 OFFICE O/P EST MOD 30 MIN: CPT | Performed by: ANESTHESIOLOGY

## 2022-01-04 RX ORDER — MORPHINE SULFATE 15 MG/1
15 TABLET, FILM COATED, EXTENDED RELEASE ORAL 3 TIMES DAILY PRN
Qty: 90 TABLET | Refills: 0 | Status: SHIPPED | OUTPATIENT
Start: 2022-02-03 | End: 2022-03-01 | Stop reason: SDUPTHER

## 2022-01-04 RX ORDER — MORPHINE SULFATE 15 MG/1
15 TABLET, FILM COATED, EXTENDED RELEASE ORAL 3 TIMES DAILY
Qty: 90 TABLET | Refills: 0 | Status: SHIPPED | OUTPATIENT
Start: 2022-01-04 | End: 2022-03-01 | Stop reason: SDUPTHER
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
## 2022-01-05 DIAGNOSIS — M79.2 NEUROPATHIC PAIN: ICD-10-CM

## 2022-01-05 RX ORDER — GABAPENTIN ENACARBIL 600 MG/1
600 TABLET, EXTENDED RELEASE ORAL 3 TIMES DAILY
Qty: 90 TABLET | Refills: 11 | Status: SHIPPED | OUTPATIENT
Start: 2022-01-05 | End: 2022-06-30

## 2022-01-05 NOTE — TELEPHONE ENCOUNTER
Marleen from [facility] pharmacy requesting refill on horizant 600mg for patient

## 2022-01-06 RX ORDER — GLIPIZIDE 5 MG/1
TABLET ORAL
Qty: 30 TABLET | Refills: 0 | Status: SHIPPED | OUTPATIENT
Start: 2022-01-06 | End: 2022-02-01

## 2022-01-06 RX ORDER — PENTOXIFYLLINE 400 MG/1
TABLET, EXTENDED RELEASE ORAL
Qty: 90 TABLET | Refills: 0 | Status: SHIPPED | OUTPATIENT
Start: 2022-01-06 | End: 2022-02-01

## 2022-01-06 RX ORDER — CYANOCOBALAMIN 1000 UG/ML
INJECTION, SOLUTION INTRAMUSCULAR; SUBCUTANEOUS
Qty: 1 ML | Refills: 0 | Status: SHIPPED | OUTPATIENT
Start: 2022-01-06 | End: 2022-02-01

## 2022-01-06 RX ORDER — ATENOLOL 50 MG/1
TABLET ORAL
Qty: 30 TABLET | Refills: 0 | Status: SHIPPED | OUTPATIENT
Start: 2022-01-06 | End: 2022-02-01

## 2022-01-06 RX ORDER — BACLOFEN 10 MG/1
TABLET ORAL
Qty: 90 TABLET | Refills: 0 | Status: SHIPPED | OUTPATIENT
Start: 2022-01-06 | End: 2022-02-01

## 2022-01-12 DIAGNOSIS — M54.16 LUMBAR RADICULITIS: Primary | ICD-10-CM

## 2022-01-12 NOTE — PROGRESS NOTES
L-spine MRI 1/2022 advanced degenerative disc disease and facet arthropathy with multiple level central canal stenosis and foraminal stenosis most severe at L4 and L5.  No focal disc herniation.  No fracture.

## 2022-01-28 ENCOUNTER — TELEPHONE (OUTPATIENT)
Dept: PAIN MEDICINE | Facility: CLINIC | Age: 72
End: 2022-01-28

## 2022-02-01 RX ORDER — ATENOLOL 50 MG/1
TABLET ORAL
Qty: 30 TABLET | Refills: 0 | Status: SHIPPED | OUTPATIENT
Start: 2022-02-01 | End: 2022-03-02

## 2022-02-01 RX ORDER — BACLOFEN 10 MG/1
TABLET ORAL
Qty: 90 TABLET | Refills: 0 | Status: SHIPPED | OUTPATIENT
Start: 2022-02-01 | End: 2022-03-02

## 2022-02-01 RX ORDER — PENTOXIFYLLINE 400 MG/1
TABLET, EXTENDED RELEASE ORAL
Qty: 90 TABLET | Refills: 0 | Status: SHIPPED | OUTPATIENT
Start: 2022-02-01 | End: 2022-03-02

## 2022-02-01 RX ORDER — CYANOCOBALAMIN 1000 UG/ML
INJECTION, SOLUTION INTRAMUSCULAR; SUBCUTANEOUS
Qty: 1 ML | Refills: 0 | Status: SHIPPED | OUTPATIENT
Start: 2022-02-01 | End: 2022-03-02

## 2022-02-01 RX ORDER — GLIPIZIDE 5 MG/1
TABLET ORAL
Qty: 30 TABLET | Refills: 0 | Status: SHIPPED | OUTPATIENT
Start: 2022-02-01 | End: 2022-03-02

## 2022-02-03 NOTE — TELEPHONE ENCOUNTER
2/3/22-- LEFT VM TO WHAT DR CRAFT HAD TO SAY---  LEFT  MESSAGE WITH PHONE #  [phone]---OF MEDICAL RECORDS FOR HIM TO GET IN CONTACT WITH-- HUB  CAN TELL PT THIS

## 2022-02-07 ENCOUNTER — HOSPITAL ENCOUNTER (OUTPATIENT)
Dept: PAIN MEDICINE | Facility: HOSPITAL | Age: 72
Discharge: HOME | End: 2022-02-07

## 2022-02-07 VITALS
TEMPERATURE: 97.5 F | BODY MASS INDEX: 29.7 KG/M2 | HEART RATE: 64 BPM | DIASTOLIC BLOOD PRESSURE: 85 MMHG | SYSTOLIC BLOOD PRESSURE: 165 MMHG | RESPIRATION RATE: 16 BRPM | OXYGEN SATURATION: 98 % | HEIGHT: 68 IN | WEIGHT: 196 LBS

## 2022-02-07 DIAGNOSIS — M54.16 LUMBAR RADICULITIS: Primary | ICD-10-CM

## 2022-02-07 DIAGNOSIS — R52 PAIN: ICD-10-CM

## 2022-02-07 PROCEDURE — 77003 FLUOROGUIDE FOR SPINE INJECT: CPT

## 2022-02-07 PROCEDURE — 0 IOPAMIDOL 41 % SOLUTION: Performed by: ANESTHESIOLOGY

## 2022-02-07 PROCEDURE — 25010000002 METHYLPREDNISOLONE PER 40 MG: Performed by: ANESTHESIOLOGY

## 2022-02-07 PROCEDURE — 62323 NJX INTERLAMINAR LMBR/SAC: CPT | Performed by: ANESTHESIOLOGY

## 2022-02-07 RX ORDER — METHYLPREDNISOLONE ACETATE 40 MG/ML
40 INJECTION, SUSPENSION INTRA-ARTICULAR; INTRALESIONAL; INTRAMUSCULAR; SOFT TISSUE ONCE
Status: COMPLETED | OUTPATIENT
Start: 2022-02-07 | End: 2022-02-07

## 2022-02-07 RX ADMIN — METHYLPREDNISOLONE ACETATE 40 MG: 40 INJECTION, SUSPENSION INTRA-ARTICULAR; INTRALESIONAL; INTRAMUSCULAR; INTRASYNOVIAL; SOFT TISSUE at 08:52

## 2022-02-07 RX ADMIN — IOPAMIDOL 3 ML: 408 INJECTION, SOLUTION INTRATHECAL at 08:52

## 2022-02-07 NOTE — PROCEDURES
"Subjective    CC back  pain  Merlin Trujillo is a 72 y.o. male lumbar stenosis, lumbar radiculitis here for LESI.  No anticoagulation    Pain Assessment   Location of Pain: Lower Back, R Hip, L Hip, right leg   description of Pain: Dull/Aching, Throbbing, Stabbing  Previous Pain Rating :4  Current Pain Ratin  Aggravating Factors: Activity  Alleviating Factors: Rest, Medication    The following portions of the patient's history were reviewed and updated as appropriate: allergies, current medications, past family history, past medical history, past social history, past surgical history and problem list.  Review of Systems  As in HPI  Objective   Physical Exam  Constitutional:       General: He is not in acute distress.  Cardiovascular:      Rate and Rhythm: Normal rate.   Pulmonary:      Effort: Pulmonary effort is normal.   Musculoskeletal:      Lumbar back: Tenderness present. Decreased range of motion.   Neurological:      Mental Status: He is alert and oriented to person, place, and time.       /85 (BP Location: Left arm, Patient Position: Sitting)   Pulse 64   Temp 97.5 °F (36.4 °C) (Skin)   Resp 16   Ht 172.7 cm (68\")   Wt 88.9 kg (196 lb)   SpO2 98%   BMI 29.80 kg/m²     Assessment/Plan    underwent LESI.    RTC 4-6 weeks or as needed for repeat    DATE OF PROCEDURE: 2022    PREOPERATIVE DIAGNOSIS: lumbar DDD/radiculitis    POSTOPERATIVE DIAGNOSIS: same    PROCEDURE PERFORMED:  lumbar Epidural Steroid Injection    The patient presents with a history of   lumbar degenerative disc disease with  radiculitis. The patient presents today for a  lumbar epidural steroid injection at level  L5/S1.   The patient was seen in the preoperative area.  Patient's consent was obtained and updated.  Vitals were taken.  Patient was then brought to the procedure suite and placed in a prone position. The appropriate anatomic area was widely prepped with Chloraprep and draped in a sterile fashion. Noninvasive " monitoring per routine anesthesia protocol was placed.  Under fluoroscopic guidance using  AP view a 20 gauge styleted tuohy needle was passed through skin anesthetized with 1% Lidocaine without epinephrine.  The needle was advanced using the continuous loss of resistance to saline technique into the L5 epidural space. Needle tip placement in the epidural space was confirmed by loss of resistance and injection of 1 mL of  preservative free contrast.  Following this 8 mL of a solution containing  1 mL of 40 mg Depo-Medrol and 7 mL of preservative-free saline was carefully administered in the epidural space.  A sterile dressing was placed over the puncture site.    The patient tolerated the procedure with no complications . They were then brought to the post procedure area where they recovered nicely.

## 2022-02-07 NOTE — DISCHARGE INSTRUCTIONS
EPIDURAL STEROID INJECTION          An epidural steroid injection is a shot of steroid medicine and numbing medicine that is given into the space between the spinal cord and the bones of the back (epidural space).  The injection helps relieve pain by an irritated or swollen nerve root.    TELL YOUR HEALTH CARE PROVIDER ABOUT:  • Any allergies you have  • All medicines you are taking including any over the counter medicines  • Any blood disorders you have  • Any surgeries you have had  • Any medical conditions you have  • Whether you are pregnant or may be pregnant    WHAT ARE THE RISK?  Generally, this is a safe procedure. However,problems may occur, including  • Headache  • Bleeding  • Infection  • Allergic Reaction  • Nerve Damage    WHAT CAN I EXPECT AFTER THE PROCEDURE?    INJECTION SITE  • Remove the Band-Aid/s after 24 hours  • Check your injection site every day for signs of infection.  Check for:             Redness             Bleeding (small amt is normal)             Warmth             Pus or bad odor  • Some numbness may be experienced for several hours following the procedure.  • Avoid using heat on the injection site for 24 hours. You may use ice intermittently if needed by placing a         towel between your skin and the ice bag and using the ice for 20 minutes 2-3 times a day.  • Do not take baths, swim or use a hot tub for 24 hours.    ACTIVITY  • No strenuous activity for 24 hours then return to normal activity as tolerated.  • If your leg is numb, no driving until full sensation and strength has returned.    GENERAL INSTRUCTIONS:  • The injection site may feel numb, use ice with caution if numbness is present and no heat for 24 hours or until numbness is gone.   • If you have numbness or weakness in your arm or leg, use those areas with caution until normal sensation returns.  • It is not uncommon to notice an increase in discomfort or a change in the location of discomfort for 3-4 days after  the procedure.  If discomfort is noticed at the injection site, ice may be            applied to that area for 20 min 2-3 times a day.  • Take the pain medicine your physician has prescribed or over the counter pain relievers as long as you do not have any contraindications.  • If you are a diabetic, monitor your blood sugar closely.  The steroids used in your procedure may increase your blood sugar level up to 36 hours after the injection.  If your blood sugar is greater than 250, call the physician that helps you monitor your blood sugar.  • Keep all follow-up visits as scheduled by your health care provider. This is important.    CONTACT OUR OFFICE IF:  • You have any of these signs of infection            -Redness, swelling, or warmth around your injection site.            -Fluid or blood coming from your injection site (small amt of blood is normal)            -Pus or a bad odor from your injection site            -A fever  • You develop a severe headache or a stiff neck  • You lose control of your bladder or bowel movements      [facility] HOURS   • Monday-Friday 7:30 am. - 4:00 pm.  For any problem related to your procedure we can be reached at [phone]  • If you experience an emergency with your procedure, call [phone] or go to the emergency room.

## 2022-02-08 ENCOUNTER — TELEPHONE (OUTPATIENT)
Dept: PAIN MEDICINE | Facility: HOSPITAL | Age: 72
End: 2022-02-08

## 2022-02-08 NOTE — TELEPHONE ENCOUNTER
Post op procedure call made, Patient reports doing fine. Reports pain is at a 0. No questions or concerns.

## 2022-03-01 ENCOUNTER — OFFICE VISIT (OUTPATIENT)
Dept: PAIN MEDICINE | Facility: CLINIC | Age: 72
End: 2022-03-01

## 2022-03-01 VITALS
SYSTOLIC BLOOD PRESSURE: 161 MMHG | BODY MASS INDEX: 28.79 KG/M2 | OXYGEN SATURATION: 94 % | HEIGHT: 68 IN | DIASTOLIC BLOOD PRESSURE: 71 MMHG | RESPIRATION RATE: 16 BRPM | HEART RATE: 64 BPM | WEIGHT: 190 LBS

## 2022-03-01 DIAGNOSIS — M47.812 CERVICAL SPONDYLOSIS WITHOUT MYELOPATHY: ICD-10-CM

## 2022-03-01 DIAGNOSIS — M54.16 LUMBAR RADICULITIS: ICD-10-CM

## 2022-03-01 DIAGNOSIS — M79.2 NEUROPATHIC PAIN: ICD-10-CM

## 2022-03-01 DIAGNOSIS — G89.4 CHRONIC PAIN SYNDROME: Primary | ICD-10-CM

## 2022-03-01 DIAGNOSIS — M47.817 LUMBOSACRAL SPONDYLOSIS WITHOUT MYELOPATHY: ICD-10-CM

## 2022-03-01 DIAGNOSIS — Z79.899 HIGH RISK MEDICATION USE: ICD-10-CM

## 2022-03-01 PROCEDURE — 99214 OFFICE O/P EST MOD 30 MIN: CPT | Performed by: ANESTHESIOLOGY

## 2022-03-01 RX ORDER — MORPHINE SULFATE 15 MG/1
15 TABLET, FILM COATED, EXTENDED RELEASE ORAL 3 TIMES DAILY PRN
Qty: 90 TABLET | Refills: 0 | Status: SHIPPED | OUTPATIENT
Start: 2022-04-04 | End: 2022-04-26 | Stop reason: SDUPTHER

## 2022-03-01 RX ORDER — MORPHINE SULFATE 15 MG/1
15 TABLET, FILM COATED, EXTENDED RELEASE ORAL 3 TIMES DAILY
Qty: 90 TABLET | Refills: 0 | Status: SHIPPED | OUTPATIENT
Start: 2022-03-01 | End: 2022-04-26 | Stop reason: SDUPTHER
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
## 2022-03-02 RX ORDER — CYANOCOBALAMIN 1000 UG/ML
INJECTION, SOLUTION INTRAMUSCULAR; SUBCUTANEOUS
Qty: 1 ML | Refills: 0 | Status: SHIPPED | OUTPATIENT
Start: 2022-03-02 | End: 2022-04-01

## 2022-03-02 RX ORDER — GLIPIZIDE 5 MG/1
TABLET ORAL
Qty: 30 TABLET | Refills: 0 | Status: SHIPPED | OUTPATIENT
Start: 2022-03-02 | End: 2022-03-03 | Stop reason: SDUPTHER

## 2022-03-02 RX ORDER — ATORVASTATIN CALCIUM 40 MG/1
TABLET, FILM COATED ORAL
Qty: 30 TABLET | Refills: 1 | Status: SHIPPED | OUTPATIENT
Start: 2022-03-02 | End: 2022-03-03 | Stop reason: SDUPTHER

## 2022-03-02 RX ORDER — PENTOXIFYLLINE 400 MG/1
TABLET, EXTENDED RELEASE ORAL
Qty: 90 TABLET | Refills: 0 | Status: SHIPPED | OUTPATIENT
Start: 2022-03-02 | End: 2022-03-03 | Stop reason: SDUPTHER

## 2022-03-02 RX ORDER — BACLOFEN 10 MG/1
TABLET ORAL
Qty: 90 TABLET | Refills: 0 | Status: SHIPPED | OUTPATIENT
Start: 2022-03-02 | End: 2022-04-01

## 2022-03-02 RX ORDER — ATENOLOL 50 MG/1
TABLET ORAL
Qty: 30 TABLET | Refills: 0 | Status: SHIPPED | OUTPATIENT
Start: 2022-03-02 | End: 2022-03-03 | Stop reason: SDUPTHER

## 2022-03-03 ENCOUNTER — OFFICE VISIT (OUTPATIENT)
Dept: FAMILY MEDICINE CLINIC | Facility: CLINIC | Age: 72
End: 2022-03-03

## 2022-03-03 VITALS
BODY MASS INDEX: 28.19 KG/M2 | HEIGHT: 68 IN | WEIGHT: 186 LBS | RESPIRATION RATE: 16 BRPM | OXYGEN SATURATION: 97 % | SYSTOLIC BLOOD PRESSURE: 135 MMHG | TEMPERATURE: 98.2 F | HEART RATE: 72 BPM | DIASTOLIC BLOOD PRESSURE: 74 MMHG

## 2022-03-03 DIAGNOSIS — N18.31 STAGE 3A CHRONIC KIDNEY DISEASE: ICD-10-CM

## 2022-03-03 DIAGNOSIS — E78.2 MIXED HYPERLIPIDEMIA: ICD-10-CM

## 2022-03-03 DIAGNOSIS — E11.65 TYPE 2 DIABETES MELLITUS WITH HYPERGLYCEMIA, WITHOUT LONG-TERM CURRENT USE OF INSULIN: Primary | ICD-10-CM

## 2022-03-03 DIAGNOSIS — Z23 FLU VACCINE NEED: ICD-10-CM

## 2022-03-03 DIAGNOSIS — I10 PRIMARY HYPERTENSION: ICD-10-CM

## 2022-03-03 LAB
EXPIRATION DATE: NORMAL
HBA1C MFR BLD: 7.3 %
Lab: 903

## 2022-03-03 PROCEDURE — 90662 IIV NO PRSV INCREASED AG IM: CPT | Performed by: FAMILY MEDICINE

## 2022-03-03 PROCEDURE — 83036 HEMOGLOBIN GLYCOSYLATED A1C: CPT | Performed by: FAMILY MEDICINE

## 2022-03-03 PROCEDURE — 99214 OFFICE O/P EST MOD 30 MIN: CPT | Performed by: FAMILY MEDICINE

## 2022-03-03 PROCEDURE — G0008 ADMIN INFLUENZA VIRUS VAC: HCPCS | Performed by: FAMILY MEDICINE

## 2022-03-03 PROCEDURE — 3051F HG A1C>EQUAL 7.0%<8.0%: CPT | Performed by: FAMILY MEDICINE

## 2022-03-03 RX ORDER — ATENOLOL 50 MG/1
25 TABLET ORAL 2 TIMES DAILY
Qty: 90 TABLET | Refills: 1 | Status: SHIPPED | OUTPATIENT
Start: 2022-03-03 | End: 2022-09-22

## 2022-03-03 RX ORDER — GLIPIZIDE 5 MG/1
5 TABLET ORAL DAILY
Qty: 90 TABLET | Refills: 1 | Status: SHIPPED | OUTPATIENT
Start: 2022-03-03 | End: 2022-04-29

## 2022-03-03 RX ORDER — PENTOXIFYLLINE 400 MG/1
400 TABLET, EXTENDED RELEASE ORAL 3 TIMES DAILY
Qty: 90 TABLET | Refills: 1 | Status: SHIPPED | OUTPATIENT
Start: 2022-03-03 | End: 2022-05-25

## 2022-03-03 RX ORDER — ATORVASTATIN CALCIUM 40 MG/1
40 TABLET, FILM COATED ORAL
Qty: 90 TABLET | Refills: 1 | Status: SHIPPED | OUTPATIENT
Start: 2022-03-03 | End: 2022-09-22

## 2022-03-03 RX ORDER — AMLODIPINE BESYLATE 5 MG/1
5 TABLET ORAL 2 TIMES DAILY
Qty: 180 TABLET | Refills: 1 | Status: SHIPPED | OUTPATIENT
Start: 2022-03-03 | End: 2022-09-22

## 2022-03-03 RX ORDER — LUBIPROSTONE 24 UG/1
24 CAPSULE ORAL 2 TIMES DAILY
Qty: 180 CAPSULE | Refills: 1 | Status: SHIPPED | OUTPATIENT
Start: 2022-03-03 | End: 2022-08-30 | Stop reason: SDUPTHER

## 2022-03-03 NOTE — ASSESSMENT & PLAN NOTE
Recheck blood work and call with results.  Continue high-potency statin with atorvastatin 40 mg daily.

## 2022-03-03 NOTE — ASSESSMENT & PLAN NOTE
Diabetes is worsening.   Continue current treatment regimen.  Diabetes will be reassessed in 3 months.A1c not at goal. Dietary improvements encouraged.

## 2022-03-03 NOTE — PROGRESS NOTES
"Diabetes    Subjective    History of Present Illness      Merlin Trujillo presents to [facility] for   Recheck of diabetes and hypertension. He has no recent hypoglycemic episodes and denies dizziness, palpitations, or chest pain.        Review of Systems   Constitutional: Negative for chills, fatigue and fever.   Respiratory: Negative for cough and shortness of breath.    Cardiovascular: Negative for chest pain and palpitations.   Gastrointestinal: Negative for constipation, diarrhea, nausea and vomiting.   Endocrine: Negative for polydipsia, polyphagia and polyuria.   Musculoskeletal: Negative for back pain and neck pain.   Skin: Negative for rash.   Neurological: Negative for dizziness and headaches.       Objective   Vital Signs:   /74   Pulse 72   Temp 98.2 °F (36.8 °C) (Infrared)   Resp 16   Ht 172.7 cm (68\")   Wt 84.4 kg (186 lb)   SpO2 97%   BMI 28.28 kg/m²     Physical Exam  Constitutional:       Appearance: He is well-developed.   HENT:      Right Ear: Hearing normal.      Left Ear: Hearing normal.   Eyes:      General: Lids are normal.         Right eye: No discharge.         Left eye: No discharge.      Conjunctiva/sclera: Conjunctivae normal.      Pupils: Pupils are equal, round, and reactive to light.   Pulmonary:      Effort: Pulmonary effort is normal. No respiratory distress.   Psychiatric:         Speech: Speech normal.         Behavior: Behavior normal.         Thought Content: Thought content normal.         Judgment: Judgment normal.        Result Review :                 Hemoglobin A1C (%)   Date Value   03/03/2022 7.3       Diagnoses and all orders for this visit:    1. Type 2 diabetes mellitus with hyperglycemia, without long-term current use of insulin ([facility]) (Primary)  Assessment & Plan:  Diabetes is worsening.   Continue current treatment regimen.  Diabetes will be reassessed in 3 months.A1c not at goal. Dietary improvements encouraged.     Orders:  -     POC " Glycosylated Hemoglobin (Hb A1C)  -     Cancel: Microalbumin / Creatinine Urine Ratio - Urine, Clean Catch    2. Flu vaccine need  -     Fluzone High-Dose 65+yrs    3. Primary hypertension  Assessment & Plan:  Stable. Continue atenolol, amlodipine.      4. Stage 3a chronic kidney disease (HCC)  -     CBC & Differential  -     Comprehensive Metabolic Panel    5. Mixed hyperlipidemia  Assessment & Plan:  Recheck blood work and call with results.  Continue high-potency statin with atorvastatin 40 mg daily.     Orders:  -     Comprehensive Metabolic Panel  -     Lipid Panel    Other orders  -     pentoxifylline (TRENtal) 400 MG CR tablet; Take 1 tablet by mouth 3 (Three) Times a Day.  Dispense: 90 tablet; Refill: 1  -     metFORMIN (GLUCOPHAGE) 500 MG tablet; Take 1 tablet by mouth 2 (Two) Times a Day With Meals.  Dispense: 180 tablet; Refill: 1  -     lubiprostone (Amitiza) 24 MCG capsule; Take 1 capsule by mouth 2 (Two) Times a Day.  Dispense: 180 capsule; Refill: 1  -     glipizide (GLUCOTROL) 5 MG tablet; Take 1 tablet by mouth Daily.  Dispense: 90 tablet; Refill: 1  -     atorvastatin (LIPITOR) 40 MG tablet; Take 1 tablet by mouth every night at bedtime.  Dispense: 90 tablet; Refill: 1  -     atenolol (TENORMIN) 50 MG tablet; Take 0.5 tablets by mouth 2 (Two) Times a Day.  Dispense: 90 tablet; Refill: 1  -     amLODIPine (NORVASC) 5 MG tablet; Take 1 tablet by mouth 2 (Two) Times a Day.  Dispense: 180 tablet; Refill: 1        Follow Up   No follow-ups on file.  Patient was given instructions and counseling regarding his condition or for health maintenance advice. Please see specific information pulled into the AVS if appropriate.     Reggie Duane Lyell, MD  3/3/2022  This note was electronically signed.

## 2022-03-04 ENCOUNTER — PATIENT ROUNDING (BHMG ONLY) (OUTPATIENT)
Dept: FAMILY MEDICINE CLINIC | Facility: CLINIC | Age: 72
End: 2022-03-04

## 2022-03-04 LAB
ALBUMIN SERPL-MCNC: 4.6 G/DL (ref 3.7–4.7)
ALBUMIN/GLOB SERPL: 1.4 {RATIO} (ref 1.2–2.2)
ALP SERPL-CCNC: 114 IU/L (ref 44–121)
ALT SERPL-CCNC: 23 IU/L (ref 0–44)
AST SERPL-CCNC: 24 IU/L (ref 0–40)
BASOPHILS # BLD AUTO: 0 X10E3/UL (ref 0–0.2)
BASOPHILS NFR BLD AUTO: 0 %
BILIRUB SERPL-MCNC: 0.6 MG/DL (ref 0–1.2)
BUN SERPL-MCNC: 14 MG/DL (ref 8–27)
BUN/CREAT SERPL: 11 (ref 10–24)
CALCIUM SERPL-MCNC: 9.4 MG/DL (ref 8.6–10.2)
CHLORIDE SERPL-SCNC: 99 MMOL/L (ref 96–106)
CHOLEST SERPL-MCNC: 147 MG/DL (ref 100–199)
CO2 SERPL-SCNC: 23 MMOL/L (ref 20–29)
CREAT SERPL-MCNC: 1.3 MG/DL (ref 0.76–1.27)
EGFR GENE MUT ANL BLD/T: 58 ML/MIN/1.73
EOSINOPHIL # BLD AUTO: 0.1 X10E3/UL (ref 0–0.4)
EOSINOPHIL NFR BLD AUTO: 2 %
ERYTHROCYTE [DISTWIDTH] IN BLOOD BY AUTOMATED COUNT: 13 % (ref 11.6–15.4)
GLOBULIN SER CALC-MCNC: 3.2 G/DL (ref 1.5–4.5)
GLUCOSE SERPL-MCNC: 153 MG/DL (ref 65–99)
HCT VFR BLD AUTO: 40.5 % (ref 37.5–51)
HDLC SERPL-MCNC: 58 MG/DL
HGB BLD-MCNC: 13.7 G/DL (ref 13–17.7)
IMM GRANULOCYTES # BLD AUTO: 0 X10E3/UL (ref 0–0.1)
IMM GRANULOCYTES NFR BLD AUTO: 0 %
LDLC SERPL CALC-MCNC: 69 MG/DL (ref 0–99)
LYMPHOCYTES # BLD AUTO: 1.5 X10E3/UL (ref 0.7–3.1)
LYMPHOCYTES NFR BLD AUTO: 19 %
MCH RBC QN AUTO: 29.3 PG (ref 26.6–33)
MCHC RBC AUTO-ENTMCNC: 33.8 G/DL (ref 31.5–35.7)
MCV RBC AUTO: 87 FL (ref 79–97)
MONOCYTES # BLD AUTO: 0.6 X10E3/UL (ref 0.1–0.9)
MONOCYTES NFR BLD AUTO: 7 %
NEUTROPHILS # BLD AUTO: 5.6 X10E3/UL (ref 1.4–7)
NEUTROPHILS NFR BLD AUTO: 72 %
PLATELET # BLD AUTO: 332 X10E3/UL (ref 150–450)
POTASSIUM SERPL-SCNC: 4.2 MMOL/L (ref 3.5–5.2)
PROT SERPL-MCNC: 7.8 G/DL (ref 6–8.5)
RBC # BLD AUTO: 4.68 X10E6/UL (ref 4.14–5.8)
SODIUM SERPL-SCNC: 138 MMOL/L (ref 134–144)
TRIGL SERPL-MCNC: 111 MG/DL (ref 0–149)
VLDLC SERPL CALC-MCNC: 20 MG/DL (ref 5–40)
WBC # BLD AUTO: 7.8 X10E3/UL (ref 3.4–10.8)

## 2022-04-01 RX ORDER — BACLOFEN 10 MG/1
TABLET ORAL
Qty: 90 TABLET | Refills: 0 | Status: SHIPPED | OUTPATIENT
Start: 2022-04-01 | End: 2022-04-26 | Stop reason: SDUPTHER

## 2022-04-01 RX ORDER — CYANOCOBALAMIN 1000 UG/ML
INJECTION, SOLUTION INTRAMUSCULAR; SUBCUTANEOUS
Qty: 1 ML | Refills: 0 | Status: SHIPPED | OUTPATIENT
Start: 2022-04-01 | End: 2022-04-26 | Stop reason: SDUPTHER

## 2022-04-19 ENCOUNTER — PATIENT ROUNDING (BHMG ONLY) (OUTPATIENT)
Dept: FAMILY MEDICINE CLINIC | Facility: CLINIC | Age: 72
End: 2022-04-19

## 2022-04-19 ENCOUNTER — OFFICE VISIT (OUTPATIENT)
Dept: FAMILY MEDICINE CLINIC | Facility: CLINIC | Age: 72
End: 2022-04-19

## 2022-04-19 ENCOUNTER — LAB (OUTPATIENT)
Dept: LAB | Facility: HOSPITAL | Age: 72
End: 2022-04-19

## 2022-04-19 VITALS
WEIGHT: 188 LBS | TEMPERATURE: 96.9 F | BODY MASS INDEX: 28.49 KG/M2 | SYSTOLIC BLOOD PRESSURE: 161 MMHG | HEART RATE: 63 BPM | OXYGEN SATURATION: 99 % | DIASTOLIC BLOOD PRESSURE: 74 MMHG | HEIGHT: 68 IN

## 2022-04-19 DIAGNOSIS — H91.8X2 OTHER SPECIFIED HEARING LOSS OF LEFT EAR, UNSPECIFIED HEARING STATUS ON CONTRALATERAL SIDE: Chronic | ICD-10-CM

## 2022-04-19 DIAGNOSIS — B36.9 FUNGAL DERMATITIS: Chronic | ICD-10-CM

## 2022-04-19 DIAGNOSIS — M54.12 CERVICAL RADICULITIS: Chronic | ICD-10-CM

## 2022-04-19 DIAGNOSIS — G89.4 CHRONIC PAIN SYNDROME: Chronic | ICD-10-CM

## 2022-04-19 DIAGNOSIS — N18.31 STAGE 3A CHRONIC KIDNEY DISEASE: Chronic | ICD-10-CM

## 2022-04-19 DIAGNOSIS — E78.2 MIXED HYPERLIPIDEMIA: Chronic | ICD-10-CM

## 2022-04-19 DIAGNOSIS — N40.0 ENLARGED PROSTATE WITHOUT LOWER URINARY TRACT SYMPTOMS (LUTS): Chronic | ICD-10-CM

## 2022-04-19 DIAGNOSIS — L57.0 KERATOTIC LESION: Chronic | ICD-10-CM

## 2022-04-19 DIAGNOSIS — E55.9 VITAMIN D DEFICIENCY: Primary | ICD-10-CM

## 2022-04-19 DIAGNOSIS — G56.32 NEUROPATHY OF LEFT RADIAL NERVE: Chronic | ICD-10-CM

## 2022-04-19 DIAGNOSIS — E53.8 B12 DEFICIENCY: ICD-10-CM

## 2022-04-19 DIAGNOSIS — I65.23 BILATERAL CAROTID ARTERY STENOSIS: Chronic | ICD-10-CM

## 2022-04-19 DIAGNOSIS — I25.10 CHRONIC CORONARY ARTERY DISEASE: Chronic | ICD-10-CM

## 2022-04-19 DIAGNOSIS — I10 PRIMARY HYPERTENSION: Chronic | ICD-10-CM

## 2022-04-19 DIAGNOSIS — E55.9 VITAMIN D DEFICIENCY: ICD-10-CM

## 2022-04-19 PROBLEM — R22.41 LOCALIZED SWELLING, MASS AND LUMP, RIGHT LOWER LIMB: Status: ACTIVE | Noted: 2022-04-19

## 2022-04-19 PROBLEM — R22.41 LOCALIZED SWELLING, MASS AND LUMP, RIGHT LOWER LIMB: Status: RESOLVED | Noted: 2022-04-19 | Resolved: 2022-04-19

## 2022-04-19 LAB
25(OH)D3 SERPL-MCNC: 35.8 NG/ML (ref 30–100)
ALBUMIN UR-MCNC: 6.3 MG/DL
ANION GAP SERPL CALCULATED.3IONS-SCNC: 13.5 MMOL/L (ref 5–15)
BUN SERPL-MCNC: 15 MG/DL (ref 8–23)
BUN/CREAT SERPL: 13.2 (ref 7–25)
CALCIUM SPEC-SCNC: 9.6 MG/DL (ref 8.6–10.5)
CHLORIDE SERPL-SCNC: 103 MMOL/L (ref 98–107)
CO2 SERPL-SCNC: 23.5 MMOL/L (ref 22–29)
CREAT SERPL-MCNC: 1.14 MG/DL (ref 0.76–1.27)
CREAT UR-MCNC: 135.6 MG/DL
EGFRCR SERPLBLD CKD-EPI 2021: 68.3 ML/MIN/1.73
GLUCOSE SERPL-MCNC: 209 MG/DL (ref 65–99)
MICROALBUMIN/CREAT UR: 46.5 MG/G
POTASSIUM SERPL-SCNC: 4.2 MMOL/L (ref 3.5–5.2)
SODIUM SERPL-SCNC: 140 MMOL/L (ref 136–145)
VIT B12 BLD-MCNC: 1949 PG/ML (ref 211–946)

## 2022-04-19 PROCEDURE — 82607 VITAMIN B-12: CPT | Performed by: NURSE PRACTITIONER

## 2022-04-19 PROCEDURE — 82306 VITAMIN D 25 HYDROXY: CPT

## 2022-04-19 PROCEDURE — 99214 OFFICE O/P EST MOD 30 MIN: CPT | Performed by: NURSE PRACTITIONER

## 2022-04-19 PROCEDURE — 82570 ASSAY OF URINE CREATININE: CPT

## 2022-04-19 PROCEDURE — 80048 BASIC METABOLIC PNL TOTAL CA: CPT

## 2022-04-19 PROCEDURE — 36415 COLL VENOUS BLD VENIPUNCTURE: CPT | Performed by: NURSE PRACTITIONER

## 2022-04-19 PROCEDURE — 82043 UR ALBUMIN QUANTITATIVE: CPT

## 2022-04-19 RX ORDER — OMEGA-3S/DHA/EPA/FISH OIL/D3 300MG-1000
400 CAPSULE ORAL DAILY
COMMUNITY

## 2022-04-19 RX ORDER — DIPHENOXYLATE HYDROCHLORIDE AND ATROPINE SULFATE 2.5; .025 MG/1; MG/1
TABLET ORAL DAILY
COMMUNITY

## 2022-04-19 RX ORDER — UBIDECARENONE 75 MG
500 CAPSULE ORAL DAILY
COMMUNITY
End: 2022-04-26

## 2022-04-19 RX ORDER — POTASSIUM CHLORIDE 750 MG/1
10 TABLET, EXTENDED RELEASE ORAL 2 TIMES DAILY
COMMUNITY
End: 2022-04-26
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
## 2022-04-19 NOTE — PATIENT INSTRUCTIONS
Referred to dermatology they should contact you.  Monitor blood sugars daily  Walk daily for exercise.

## 2022-04-19 NOTE — PROGRESS NOTES
April 19, 2022    Hello, may I speak with Merlin Trujillo?    My name is Naima      I am  with [facility]  [address] [place] [address].    Before we get started may I verify your date of birth? 1950    I am calling to officially welcome you to our practice and ask about your recent visit. Is this a good time to talk? yes    Tell me about your visit with us. What things went well?  Everything was very good. Court used the stethoscope to listen to my chest and other medicals things my other doctors haven't done for a long time. I was very pleased with the care she provided.       We're always looking for ways to make our patients' experiences even better. Do you have recommendations on ways we may improve?  no, not at this time.    Overall were you satisfied with your first visit to our practice? yes       I appreciate you taking the time to speak with me today. Is there anything else I can do for you? no      Thank you, and have a great day.

## 2022-04-26 ENCOUNTER — OFFICE VISIT (OUTPATIENT)
Dept: PAIN MEDICINE | Facility: CLINIC | Age: 72
End: 2022-04-26

## 2022-04-26 VITALS
BODY MASS INDEX: 28.49 KG/M2 | SYSTOLIC BLOOD PRESSURE: 142 MMHG | HEIGHT: 68 IN | DIASTOLIC BLOOD PRESSURE: 70 MMHG | RESPIRATION RATE: 16 BRPM | WEIGHT: 188 LBS | OXYGEN SATURATION: 97 % | HEART RATE: 64 BPM

## 2022-04-26 DIAGNOSIS — Z79.899 HIGH RISK MEDICATION USE: ICD-10-CM

## 2022-04-26 DIAGNOSIS — M47.817 LUMBOSACRAL SPONDYLOSIS WITHOUT MYELOPATHY: ICD-10-CM

## 2022-04-26 DIAGNOSIS — G89.4 CHRONIC PAIN SYNDROME: Primary | ICD-10-CM

## 2022-04-26 DIAGNOSIS — Z79.899 HIGH RISK MEDICATION USE: Primary | ICD-10-CM

## 2022-04-26 DIAGNOSIS — M79.2 NEUROPATHIC PAIN: ICD-10-CM

## 2022-04-26 DIAGNOSIS — M47.812 CERVICAL SPONDYLOSIS WITHOUT MYELOPATHY: ICD-10-CM

## 2022-04-26 PROCEDURE — 99214 OFFICE O/P EST MOD 30 MIN: CPT | Performed by: ANESTHESIOLOGY

## 2022-04-26 RX ORDER — CYANOCOBALAMIN 1000 UG/ML
INJECTION, SOLUTION INTRAMUSCULAR; SUBCUTANEOUS
Qty: 1 ML | Refills: 0 | Status: SHIPPED | OUTPATIENT
Start: 2022-04-26 | End: 2022-05-25

## 2022-04-26 RX ORDER — MORPHINE SULFATE 15 MG/1
15 TABLET, FILM COATED, EXTENDED RELEASE ORAL 3 TIMES DAILY
Qty: 90 TABLET | Refills: 0 | Status: SHIPPED | OUTPATIENT
Start: 2022-05-04 | End: 2022-06-30 | Stop reason: SDUPTHER

## 2022-04-26 RX ORDER — MORPHINE SULFATE 15 MG/1
15 TABLET, FILM COATED, EXTENDED RELEASE ORAL 3 TIMES DAILY PRN
Qty: 90 TABLET | Refills: 0 | Status: SHIPPED | OUTPATIENT
Start: 2022-06-03 | End: 2022-06-30 | Stop reason: SDUPTHER

## 2022-04-26 RX ORDER — BACLOFEN 10 MG/1
TABLET ORAL
Qty: 90 TABLET | Refills: 0 | Status: SHIPPED | OUTPATIENT
Start: 2022-04-26 | End: 2022-05-25
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
## 2022-04-29 DIAGNOSIS — R80.9 MICROALBUMINURIA: ICD-10-CM

## 2022-04-29 DIAGNOSIS — E11.65 TYPE 2 DIABETES MELLITUS WITH HYPERGLYCEMIA, WITHOUT LONG-TERM CURRENT USE OF INSULIN: Primary | ICD-10-CM

## 2022-04-29 RX ORDER — GLIPIZIDE 10 MG/1
10 TABLET ORAL
Qty: 180 TABLET | Refills: 1 | Status: SHIPPED | OUTPATIENT
Start: 2022-04-29 | End: 2022-10-25

## 2022-05-07 ENCOUNTER — APPOINTMENT (OUTPATIENT)
Dept: GENERAL RADIOLOGY | Facility: HOSPITAL | Age: 72
End: 2022-05-07

## 2022-05-07 ENCOUNTER — HOSPITAL ENCOUNTER (EMERGENCY)
Facility: HOSPITAL | Age: 72
Discharge: HOME | End: 2022-05-07
Attending: EMERGENCY MEDICINE | Admitting: EMERGENCY MEDICINE

## 2022-05-07 VITALS
OXYGEN SATURATION: 95 % | DIASTOLIC BLOOD PRESSURE: 61 MMHG | HEIGHT: 68 IN | TEMPERATURE: 98 F | SYSTOLIC BLOOD PRESSURE: 129 MMHG | RESPIRATION RATE: 14 BRPM | BODY MASS INDEX: 28.1 KG/M2 | HEART RATE: 51 BPM | WEIGHT: 185.41 LBS

## 2022-05-07 DIAGNOSIS — M25.531 RIGHT WRIST PAIN: Primary | ICD-10-CM

## 2022-05-07 DIAGNOSIS — L03.113 CELLULITIS OF RIGHT UPPER EXTREMITY: ICD-10-CM

## 2022-05-07 LAB
ALBUMIN SERPL-MCNC: 3.9 G/DL (ref 3.5–5.2)
ALBUMIN/GLOB SERPL: 1.1 G/DL
ALP SERPL-CCNC: 120 U/L (ref 39–117)
ALT SERPL W P-5'-P-CCNC: 12 U/L (ref 1–41)
ANION GAP SERPL CALCULATED.3IONS-SCNC: 15 MMOL/L (ref 5–15)
AST SERPL-CCNC: 11 U/L (ref 1–40)
BASOPHILS # BLD AUTO: 0 10*3/MM3 (ref 0–0.2)
BASOPHILS NFR BLD AUTO: 0.4 % (ref 0–1.5)
BILIRUB SERPL-MCNC: 0.8 MG/DL (ref 0–1.2)
BUN SERPL-MCNC: 13 MG/DL (ref 8–23)
BUN/CREAT SERPL: 14 (ref 7–25)
CALCIUM SPEC-SCNC: 8.9 MG/DL (ref 8.6–10.5)
CHLORIDE SERPL-SCNC: 100 MMOL/L (ref 98–107)
CO2 SERPL-SCNC: 19 MMOL/L (ref 22–29)
CREAT SERPL-MCNC: 0.93 MG/DL (ref 0.76–1.27)
DEPRECATED RDW RBC AUTO: 39.8 FL (ref 37–54)
EGFRCR SERPLBLD CKD-EPI 2021: 87.2 ML/MIN/1.73
EOSINOPHIL # BLD AUTO: 0 10*3/MM3 (ref 0–0.4)
EOSINOPHIL NFR BLD AUTO: 0.4 % (ref 0.3–6.2)
ERYTHROCYTE [DISTWIDTH] IN BLOOD BY AUTOMATED COUNT: 13.4 % (ref 12.3–15.4)
GLOBULIN UR ELPH-MCNC: 3.6 GM/DL
GLUCOSE SERPL-MCNC: 201 MG/DL (ref 65–99)
HCT VFR BLD AUTO: 38.4 % (ref 37.5–51)
HGB BLD-MCNC: 13.2 G/DL (ref 13–17.7)
LYMPHOCYTES # BLD AUTO: 0.9 10*3/MM3 (ref 0.7–3.1)
LYMPHOCYTES NFR BLD AUTO: 8.1 % (ref 19.6–45.3)
MCH RBC QN AUTO: 29 PG (ref 26.6–33)
MCHC RBC AUTO-ENTMCNC: 34.4 G/DL (ref 31.5–35.7)
MCV RBC AUTO: 84.3 FL (ref 79–97)
MONOCYTES # BLD AUTO: 1 10*3/MM3 (ref 0.1–0.9)
MONOCYTES NFR BLD AUTO: 8.5 % (ref 5–12)
NEUTROPHILS NFR BLD AUTO: 82.6 % (ref 42.7–76)
NEUTROPHILS NFR BLD AUTO: 9.6 10*3/MM3 (ref 1.7–7)
NRBC BLD AUTO-RTO: 0 /100 WBC (ref 0–0.2)
PLATELET # BLD AUTO: 244 10*3/MM3 (ref 140–450)
PMV BLD AUTO: 7.3 FL (ref 6–12)
POTASSIUM SERPL-SCNC: 3.8 MMOL/L (ref 3.5–5.2)
PROT SERPL-MCNC: 7.5 G/DL (ref 6–8.5)
RBC # BLD AUTO: 4.56 10*6/MM3 (ref 4.14–5.8)
SODIUM SERPL-SCNC: 134 MMOL/L (ref 136–145)
WBC NRBC COR # BLD: 11.6 10*3/MM3 (ref 3.4–10.8)

## 2022-05-07 PROCEDURE — 85025 COMPLETE CBC W/AUTO DIFF WBC: CPT | Performed by: NURSE PRACTITIONER

## 2022-05-07 PROCEDURE — 99283 EMERGENCY DEPT VISIT LOW MDM: CPT

## 2022-05-07 PROCEDURE — 36415 COLL VENOUS BLD VENIPUNCTURE: CPT

## 2022-05-07 PROCEDURE — 80053 COMPREHEN METABOLIC PANEL: CPT | Performed by: NURSE PRACTITIONER

## 2022-05-07 PROCEDURE — 73110 X-RAY EXAM OF WRIST: CPT

## 2022-05-07 RX ORDER — SODIUM CHLORIDE 0.9 % (FLUSH) 0.9 %
10 SYRINGE (ML) INJECTION AS NEEDED
Status: DISCONTINUED | OUTPATIENT
Start: 2022-05-07 | End: 2022-05-07 | Stop reason: HOSPADM

## 2022-05-07 RX ORDER — CEPHALEXIN 500 MG/1
500 CAPSULE ORAL 3 TIMES DAILY
Qty: 30 CAPSULE | Refills: 0 | Status: SHIPPED | OUTPATIENT
Start: 2022-05-07 | End: 2022-05-17

## 2022-05-07 RX ORDER — CEPHALEXIN 500 MG/1
500 CAPSULE ORAL ONCE
Status: COMPLETED | OUTPATIENT
Start: 2022-05-07 | End: 2022-05-07

## 2022-05-07 RX ORDER — CEPHALEXIN 500 MG/1
500 CAPSULE ORAL 3 TIMES DAILY
Qty: 30 CAPSULE | Refills: 0 | Status: SHIPPED | OUTPATIENT
Start: 2022-05-07 | End: 2022-05-07 | Stop reason: SDUPTHER

## 2022-05-07 RX ADMIN — CEPHALEXIN 500 MG: 500 CAPSULE ORAL at 15:44

## 2022-05-07 NOTE — DISCHARGE INSTRUCTIONS
Use home pain medication for pain control    Take antibiotics till gone    Follow-up with primary care on Tuesday or Wednesday for recheck return to the ER if worse

## 2022-05-07 NOTE — ED PROVIDER NOTES
Subjective   Patient is a 72-year-old gentleman who comes in with right wrist pain.  He does not know of any injury.  He states that he noticed it hurting when he went to  his jugs to water his chickens but he states he did not drop the jogs and he sat them back down when he noticed that the wrist was hurting he states this was 3 days ago he now has some soft tissue swelling and some mild erythema over the dorsum of the right wrist.  He denies any numbness or tingling-he denies any falls or injury to the skin of the right wrist  Reports no open wound          Review of Systems   Constitutional: Negative for chills, fatigue and fever.   HENT: Negative for congestion, tinnitus and trouble swallowing.    Eyes: Negative for photophobia, discharge and redness.   Respiratory: Negative for cough and shortness of breath.    Cardiovascular: Negative for chest pain and palpitations.   Gastrointestinal: Negative for abdominal pain, diarrhea, nausea and vomiting.   Genitourinary: Negative for dysuria, frequency and urgency.   Musculoskeletal: Positive for joint swelling. Negative for back pain and myalgias.        Right wrist swelling and pain   Skin: Positive for color change. Negative for rash.        Erythema over the dorsum of the right wrist   Neurological: Negative for dizziness and headaches.   Psychiatric/Behavioral: Negative for confusion.   All other systems reviewed and are negative.      Past Medical History:   Diagnosis Date   • Adenomatous polyps    • Amaurosis fugax 3/27/2017   • Arm pain     left into hand   • Arm pain    • Arm pain     left--- wk comp --injury 6/15/2015   • Blood in urine     3/2020   • BPH (benign prostatic hyperplasia)    • CAD (coronary artery disease)    • CKD (chronic kidney disease) stage 3, GFR 30-59 ml/min ([facility])    • CVA, old, speech/language deficit    • Diabetes ([facility])    • Diabetic acetonemia ([facility])    • Hearing loss    • Hematuria    • Hernia, inguinal    • Hyperlipidemia    •  Hypertension    • Kidney stones    • Low back pain    • Neuropathic pain    • Obstructive uropathy    • Shoulder pain    • Shoulder pain    • Sleep disorder    • Spondylosis, cervical    • Urgency of urination        No Known Allergies    Past Surgical History:   Procedure Laterality Date   • ANKLE SURGERY      replacement  rt   • CAROTID ARTERY ANGIOPLASTY     • CARPAL TUNNEL RELEASE     • COLONOSCOPY     • CYSTOSCOPY URETEROSCOPY LASER LITHOTRIPSY      kidney stones   • EYE SURGERY      mesh  and plate under rt eye due to orbital fx   • NOSE SURGERY      x2   • OTHER SURGICAL HISTORY      uro  lift  1 /2021   • TEETH EXTRACTION      dentures       Family History   Problem Relation Age of Onset   • Dementia Mother    • Heart disease Mother    • COPD Father    • Stroke Father    • Heart disease Father    • Hypertension Sister    • Diabetes Sister    • Hypertension Brother        Social History     Socioeconomic History   • Marital status: Significant Other   Tobacco Use   • Smoking status: Never Smoker   • Smokeless tobacco: Never Used   Vaping Use   • Vaping Use: Never used   Substance and Sexual Activity   • Alcohol use: No   • Drug use: Never   • Sexual activity: Defer           Objective   Physical Exam  Vitals reviewed.   Constitutional:       Appearance: He is well-developed.   HENT:      Head: Normocephalic and atraumatic.   Eyes:      Conjunctiva/sclera: Conjunctivae normal.      Pupils: Pupils are equal, round, and reactive to light.   Cardiovascular:      Rate and Rhythm: Normal rate and regular rhythm.      Heart sounds: Normal heart sounds.   Pulmonary:      Effort: Pulmonary effort is normal.      Breath sounds: Normal breath sounds.   Abdominal:      General: Bowel sounds are normal.      Palpations: Abdomen is soft.   Musculoskeletal:         General: Normal range of motion.        Arms:       Cervical back: Normal range of motion and neck supple.   Skin:     General: Skin is warm and dry.       "Capillary Refill: Capillary refill takes less than 2 seconds.   Neurological:      General: No focal deficit present.      Mental Status: He is alert and oriented to person, place, and time.      GCS: GCS eye subscore is 4. GCS verbal subscore is 5. GCS motor subscore is 6.   Psychiatric:         Mood and Affect: Mood normal.         Behavior: Behavior normal.         Procedures           ED Course      /91 (BP Location: Left arm, Patient Position: Sitting)   Pulse 57   Temp 98.1 °F (36.7 °C) (Oral)   Resp 14   Ht 172.7 cm (68\")   Wt 84.1 kg (185 lb 6.5 oz)   SpO2 96%   BMI 28.19 kg/m²   Labs Reviewed   COMPREHENSIVE METABOLIC PANEL - Abnormal; Notable for the following components:       Result Value    Glucose 201 (*)     Sodium 134 (*)     CO2 19.0 (*)     Alkaline Phosphatase 120 (*)     All other components within normal limits    Narrative:     GFR Normal >60  Chronic Kidney Disease <60  Kidney Failure <15     CBC WITH AUTO DIFFERENTIAL - Abnormal; Notable for the following components:    WBC 11.60 (*)     Neutrophil % 82.6 (*)     Lymphocyte % 8.1 (*)     Neutrophils, Absolute 9.60 (*)     Monocytes, Absolute 1.00 (*)     All other components within normal limits   CBC AND DIFFERENTIAL    Narrative:     The following orders were created for panel order CBC & Differential.  Procedure                               Abnormality         Status                     ---------                               -----------         ------                     CBC Auto Differential[[id number]]        Abnormal            Final result               Scan Slide[[id number]]                                                                    Please view results for these tests on the individual orders.     Medications   sodium chloride 0.9 % flush 10 mL (has no administration in time range)   cephalexin (KEFLEX) capsule 500 mg (has no administration in time range)     XR Wrist 3+ View Right    Result Date: 5/7/2022   1. No " fracture or dislocation. 2. Degenerative changes. 3. Dorsal wrist soft tissue swelling. 3. Metallic foreign body in the soft tissues of the first digit.  Electronically Signed By-Garry Clarke MD On:5/7/2022 2:53 PM This report was finalized on [id number] by  Garry Clarke MD.                                               MDM  Number of Diagnoses or Management Options  Diagnosis management comments: Patient had IV established and blood work was obtained-as well as x-ray which showed no acute fracture or dislocation did show the soft tissue swelling.       Amount and/or Complexity of Data Reviewed  Clinical lab tests: reviewed  Tests in the radiology section of CPT®: reviewed        Final diagnoses:   Right wrist pain   Cellulitis of right upper extremity       ED Disposition  ED Disposition     ED Disposition   Discharge    Condition   Stable    Comment   --             Zohaib Scott II, MD  [address] [facility]  [address]  [place] [address]  [phone]    In 5 days  If symptoms worsen, As needed    Court Anderson, APRN  [address] [facility] [address]  [place] [address]  [phone]      tuesday or wednesday for recheck         Medication List      New Prescriptions    cephalexin 500 MG capsule  Commonly known as: KEFLEX  Take 1 capsule by mouth 3 (Three) Times a Day for 10 days.           Where to Get Your Medications      These medications were sent to [facility] - [place], [place] - [address] - [phone]  - [phone]   [address] [place] [address]    Phone: [phone]   · cephalexin 500 MG capsule          Marcie Bustillo, APRN  05/07/22 1522

## 2022-05-09 ENCOUNTER — HOSPITAL ENCOUNTER (OUTPATIENT)
Dept: PAIN MEDICINE | Facility: HOSPITAL | Age: 72
Discharge: HOME | End: 2022-05-09

## 2022-05-09 VITALS
TEMPERATURE: 97.5 F | HEIGHT: 68 IN | RESPIRATION RATE: 16 BRPM | BODY MASS INDEX: 28.04 KG/M2 | SYSTOLIC BLOOD PRESSURE: 116 MMHG | HEART RATE: 58 BPM | OXYGEN SATURATION: 98 % | DIASTOLIC BLOOD PRESSURE: 75 MMHG | WEIGHT: 185 LBS

## 2022-05-09 DIAGNOSIS — R52 PAIN: ICD-10-CM

## 2022-05-09 DIAGNOSIS — M54.16 LUMBAR RADICULITIS: ICD-10-CM

## 2022-05-09 PROCEDURE — 62323 NJX INTERLAMINAR LMBR/SAC: CPT | Performed by: ANESTHESIOLOGY

## 2022-05-09 PROCEDURE — 25010000002 METHYLPREDNISOLONE PER 40 MG: Performed by: ANESTHESIOLOGY

## 2022-05-09 PROCEDURE — 77003 FLUOROGUIDE FOR SPINE INJECT: CPT

## 2022-05-09 PROCEDURE — 0 IOPAMIDOL 41 % SOLUTION: Performed by: ANESTHESIOLOGY

## 2022-05-09 RX ORDER — METHYLPREDNISOLONE ACETATE 40 MG/ML
40 INJECTION, SUSPENSION INTRA-ARTICULAR; INTRALESIONAL; INTRAMUSCULAR; SOFT TISSUE ONCE
Status: COMPLETED | OUTPATIENT
Start: 2022-05-09 | End: 2022-05-09

## 2022-05-09 RX ADMIN — IOPAMIDOL 3 ML: 408 INJECTION, SOLUTION INTRATHECAL at 09:32

## 2022-05-09 RX ADMIN — METHYLPREDNISOLONE ACETATE 40 MG: 40 INJECTION, SUSPENSION INTRA-ARTICULAR; INTRALESIONAL; INTRAMUSCULAR; INTRASYNOVIAL; SOFT TISSUE at 09:32

## 2022-05-09 NOTE — PROCEDURES
"Subjective    CC back  pain  Merlin Trujillo is a 72 y.o. male lumbar stenosis, lumbar radiculitis here for repeat LESI.  No anticoagulation    Pain Assessment   Location of Pain: Lower Back, R Hip, L Hip, right leg   description of Pain: Dull/Aching, Throbbing, Stabbing  Previous Pain Rating :4  Current Pain Ratin  Aggravating Factors: Activity  Alleviating Factors: Rest, Medication    The following portions of the patient's history were reviewed and updated as appropriate: allergies, current medications, past family history, past medical history, past social history, past surgical history and problem list.  Review of Systems  As in HPI  Objective   Physical Exam  Constitutional:       General: He is not in acute distress.  Cardiovascular:      Rate and Rhythm: Normal rate.   Pulmonary:      Effort: Pulmonary effort is normal.   Musculoskeletal:      Lumbar back: Tenderness present. Decreased range of motion.   Neurological:      Mental Status: He is alert and oriented to person, place, and time.       /79 (BP Location: Right arm, Patient Position: Sitting)   Pulse 65   Temp 97.5 °F (36.4 °C) (Skin)   Resp 16   Ht 172.7 cm (68\")   Wt 83.9 kg (185 lb)   SpO2 96%   BMI 28.13 kg/m²     Assessment/Plan    underwent repeat LESI.    RTC 4-6 weeks or as needed for repeat    DATE OF PROCEDURE: 2022    PREOPERATIVE DIAGNOSIS: lumbar DDD/radiculitis    POSTOPERATIVE DIAGNOSIS: same    PROCEDURE PERFORMED:  lumbar Epidural Steroid Injection    The patient presents with a history of   lumbar degenerative disc disease with  radiculitis. The patient presents today for a  lumbar epidural steroid injection at level  L5/S1.   The patient was seen in the preoperative area.  Patient's consent was obtained and updated.  Vitals were taken.  Patient was then brought to the procedure suite and placed in a prone position. The appropriate anatomic area was widely prepped with Chloraprep and draped in a sterile " fashion. Noninvasive monitoring per routine anesthesia protocol was placed.  Under fluoroscopic guidance using  AP view a 20 gauge styleted tuohy needle was passed through skin anesthetized with 1% Lidocaine without epinephrine.  The needle was advanced using the continuous loss of resistance to saline technique into the L5 epidural space. Needle tip placement in the epidural space was confirmed by loss of resistance and injection of 1 mL of  preservative free contrast.  Following this 8 mL of a solution containing  1 mL of 40 mg Depo-Medrol and 7 mL of preservative-free saline was carefully administered in the epidural space.  A sterile dressing was placed over the puncture site.    The patient tolerated the procedure with no complications . They were then brought to the post procedure area where they recovered nicely.

## 2022-05-09 NOTE — DISCHARGE INSTRUCTIONS

## 2022-05-10 ENCOUNTER — TELEPHONE (OUTPATIENT)
Dept: PAIN MEDICINE | Facility: HOSPITAL | Age: 72
End: 2022-05-10

## 2022-05-11 ENCOUNTER — OFFICE VISIT (OUTPATIENT)
Dept: CARDIOLOGY | Facility: CLINIC | Age: 72
End: 2022-05-11

## 2022-05-11 VITALS
HEIGHT: 68 IN | RESPIRATION RATE: 18 BRPM | WEIGHT: 187 LBS | OXYGEN SATURATION: 98 % | BODY MASS INDEX: 28.34 KG/M2 | HEART RATE: 52 BPM | SYSTOLIC BLOOD PRESSURE: 166 MMHG | DIASTOLIC BLOOD PRESSURE: 81 MMHG

## 2022-05-11 DIAGNOSIS — E11.65 TYPE 2 DIABETES MELLITUS WITH HYPERGLYCEMIA, WITHOUT LONG-TERM CURRENT USE OF INSULIN: ICD-10-CM

## 2022-05-11 DIAGNOSIS — I10 PRIMARY HYPERTENSION: ICD-10-CM

## 2022-05-11 DIAGNOSIS — E78.2 MIXED HYPERLIPIDEMIA: ICD-10-CM

## 2022-05-11 DIAGNOSIS — I25.10 CHRONIC CORONARY ARTERY DISEASE: ICD-10-CM

## 2022-05-11 DIAGNOSIS — I65.23 BILATERAL CAROTID ARTERY STENOSIS: Primary | ICD-10-CM

## 2022-05-11 PROCEDURE — 93000 ELECTROCARDIOGRAM COMPLETE: CPT | Performed by: INTERNAL MEDICINE

## 2022-05-11 PROCEDURE — 99214 OFFICE O/P EST MOD 30 MIN: CPT | Performed by: INTERNAL MEDICINE

## 2022-05-11 NOTE — PROGRESS NOTES
Cardiology Office Visit      Encounter Date:  05/11/2022    Patient ID:   Merlin Trujillo is a 72 y.o. male.    Reason For Followup:  Peripheral artery disease  Carotid stenosis  Hypertension  Hyperlipidemia    Brief Clinical History:  Dear Dr. Lyell, Reggie Duane, MD    I had the pleasure of seeing Merlin Trujillo today. As you are well aware, this is a 72 y.o. male with past medical history that is significant for history of hypertension hyperlipidemia  and peripheral arterial disease here for follow-up    Patient had symptoms of TIA with amaurosis fugax in the right eye.  Noted to have significant carotid stenosis in the right carotid artery  Status post a successful right carotid endarterectomy  Patient had recent hospitalization with urosepsis  Echocardiogram showed normal LV systolic function        Interval History:  Denies any chest pain  Patient is complaining of some nonspecific fatigue and weakness  No recent cardiac evaluation or work-up  Denies any dizziness or syncope    Interpretation Summary    · Myocardial perfusion imaging indicates a normal myocardial perfusion study with no evidence of ischemia.  · Left ventricular ejection fraction is hyperdynamic (Calculated EF > 70%). .  · Findings consistent with a normal ECG stress test.  · Impressions are consistent with a low risk study.  · There is no prior study available for comparison.    1. The patient has a history of prior right carotid surgery a few years  ago. The degree of narrowing is less than 50% by consensus panel  criteria in the right common carotid artery carotid bifurcation.  2. Mild to moderate plaque deposition left carotid bifurcation. The  degree of narrowing is less than 50% by consensus panel criteria.  3. Patent vertebral arteries bilaterally with antegrade flow.       Assessment & Plan    Impressions:     Hypertension   hyperlipidemia   peripheral arterial disease   presumed coronary artery disease but no evidence of any  "inducible ischemia on the stress test  Bilateral carotid stenosis less than 50% on carotid ultrasound that was done in March 2021  Stress test in May 2021 with normal LV systolic function normal wall motion estimated LV ejection fraction of 70% with no inducible ischemia  Chronic renal insufficiency with improvement in the creatinine numbers compared to baseline    Recommendations:  Recent myocardial perfusion study that was normal   continued aggressive risk factor modification   regular exercise   labs with primary care physician office  Labs discussed with the patient  Recent labs reviewed and discussed with the patient  Medications reviewed and discussed with patient  Labs and medications reviewed and discussed the patient  Labs from May 2022 reviewed and discussed with the patient  Lipids from March reviewed and discussed with patient lipids are optimal including LDL cholesterol less than 70  Continue current medical therapy with aspirin 81 mg p.o. once a day atenolol 25 mg p.o. twice daily Lipitor 40 mg p.o. once a day  Advised patient to consider discussing with primary care physician about going back on low-dose metformin with normalization of the renal function     follow up in office in 6 months    Objective:    Vitals:  Vitals:    05/11/22 1351   BP: 166/81   BP Location: Left arm   Patient Position: Sitting   Cuff Size: Large Adult   Pulse: 52   Resp: 18   SpO2: 98%   Weight: 84.8 kg (187 lb)   Height: 172.7 cm (68\")       Physical Exam:    General: Alert, cooperative, no distress, appears stated age  Head:  Normocephalic, atraumatic, mucous membranes moist  Eyes:  Conjunctiva/corneas clear, EOM's intact     Neck:  Supple,  no adenopathy;      Lungs: Clear to auscultation bilaterally, no wheezes rhonchi rales are noted  Chest wall: No tenderness  Heart::  Regular rate and rhythm, S1 and S2 normal, no murmur, rub or gallop  Abdomen: Soft, non-tender, nondistended bowel sounds active  Extremities: No " "cyanosis, clubbing, or edema  Pulses: 2+ and symmetric all extremities  Skin:  No rashes or lesions  Neuro/psych: A&O x3. CN II through XII are grossly intact with appropriate affect      Allergies:  No Known Allergies    Medication Review:     Current Outpatient Medications:   •  Accu-Chek FastClix Lancets misc, TEST BLOOD SUGAR ONCE DAILY AS DIRECTED, Disp: 102 each, Rfl: 0  •  Accu-Chek Guide test strip, TEST ONCE DAILY AS DIRECTED, Disp: 100 each, Rfl: 3  •  amLODIPine (NORVASC) 5 MG tablet, Take 1 tablet by mouth 2 (Two) Times a Day., Disp: 180 tablet, Rfl: 1  •  aspirin 81 MG EC tablet, Take 81 mg by mouth Daily., Disp: , Rfl:   •  atenolol (TENORMIN) 50 MG tablet, Take 0.5 tablets by mouth 2 (Two) Times a Day., Disp: 90 tablet, Rfl: 1  •  atorvastatin (LIPITOR) 40 MG tablet, Take 1 tablet by mouth every night at bedtime., Disp: 90 tablet, Rfl: 1  •  B-D 3CC LUER-RONY SYR 25GX1\" 25G X 1\" 3 ML misc, USE AS DIRECTED WITH B-12 INJECTION, Disp: 100 each, Rfl: 3  •  baclofen (LIORESAL) 10 MG tablet, TAKE ONE TABLET BY MOUTH THREE TIMES DAILY, Disp: 90 tablet, Rfl: 0  •  cephalexin (KEFLEX) 500 MG capsule, Take 1 capsule by mouth 3 (Three) Times a Day for 10 days., Disp: 30 capsule, Rfl: 0  •  cholecalciferol (VITAMIN D3) 10 MCG (400 UNIT) tablet, Take 400 Units by mouth Daily. 1000 units, Disp: , Rfl:   •  cyanocobalamin 1000 MCG/ML injection, INJECT 1ML MONTHLY, Disp: 1 mL, Rfl: 0  •  Gabapentin Enacarbil ER (Horizant) 600 MG tablet controlled-release, Take 600 mg by mouth 3 (Three) Times a Day., Disp: 90 tablet, Rfl: 11  •  Gabapentin Enacarbil  MG tablet controlled-release, Take 300 mg by mouth Every Night., Disp: 30 tablet, Rfl: 11  •  glipizide (GLUCOTROL) 10 MG tablet, Take 1 tablet by mouth 2 (Two) Times a Day Before Meals., Disp: 180 tablet, Rfl: 1  •  lubiprostone (Amitiza) 24 MCG capsule, Take 1 capsule by mouth 2 (Two) Times a Day., Disp: 180 capsule, Rfl: 1  •  Morphine (MS CONTIN) 15 MG 12 hr " "tablet, Take 1 tablet by mouth 3 (Three) Times a Day., Disp: 90 tablet, Rfl: 0  •  [START ON 6/3/2022] Morphine (MS CONTIN) 15 MG 12 hr tablet, Take 1 tablet by mouth 3 (Three) Times a Day As Needed for Severe Pain . DNF before 6/3/2022, Disp: 90 tablet, Rfl: 0  •  multivitamin (THERAGRAN) tablet tablet, Take  by mouth Daily. Calcium+magnesium+zinc \"Sometimes\", Disp: , Rfl:   •  NON FORMULARY, Comfort guardian 24-- for pain - ordered meds-- helps with arm pain, Disp: , Rfl:   •  pentoxifylline (TRENtal) 400 MG CR tablet, Take 1 tablet by mouth 3 (Three) Times a Day., Disp: 90 tablet, Rfl: 1    Family History:  Family History   Problem Relation Age of Onset   • Dementia Mother    • Heart disease Mother    • COPD Father    • Stroke Father    • Heart disease Father    • Hypertension Sister    • Diabetes Sister    • Hypertension Brother        Past Medical History:  Past Medical History:   Diagnosis Date   • Adenomatous polyps    • Amaurosis fugax 3/27/2017   • Arm pain     left into hand   • Arm pain    • Arm pain     left--- wk comp --injury 6/15/2015   • Blood in urine     3/2020   • BPH (benign prostatic hyperplasia)    • CAD (coronary artery disease)    • CKD (chronic kidney disease) stage 3, GFR 30-59 ml/min ([facility])    • CVA, old, speech/language deficit    • Diabetes ([facility])    • Diabetic acetonemia ([facility])    • Hearing loss    • Hematuria    • Hernia, inguinal    • Hyperlipidemia    • Hypertension    • Kidney stones    • Low back pain    • Neuropathic pain    • Obstructive uropathy    • Shoulder pain    • Shoulder pain    • Sleep disorder    • Spondylosis, cervical    • Urgency of urination        Past surgical History:  Past Surgical History:   Procedure Laterality Date   • ANKLE SURGERY      replacement  rt   • CAROTID ARTERY ANGIOPLASTY     • CARPAL TUNNEL RELEASE     • COLONOSCOPY     • CYSTOSCOPY URETEROSCOPY LASER LITHOTRIPSY      kidney stones   • EYE SURGERY      mesh  and plate under rt eye due to orbital fx "   • NOSE SURGERY      x2   • OTHER SURGICAL HISTORY      uro  lift  1 /2021   • TEETH EXTRACTION      dentures       Social History:  Social History     Socioeconomic History   • Marital status: Significant Other   Tobacco Use   • Smoking status: Never Smoker   • Smokeless tobacco: Never Used   Vaping Use   • Vaping Use: Never used   Substance and Sexual Activity   • Alcohol use: No   • Drug use: Never   • Sexual activity: Defer       Review of Systems:  The following systems were reviewed as they relate to the cardiovascular system: Constitutional, Eyes, ENT, Cardiovascular, Respiratory, Gastrointestinal, Integumentary, Neurological, Psychiatric, Hematologic, Endocrine, Musculoskeletal, and Genitourinary. The pertinent cardiovascular findings are reported above with all other cardiovascular points within those systems being negative.    Diagnostic Study Review:     Current Electrocardiogram:    ECG 12 Lead    Date/Time: 5/11/2022 4:58 PM  Performed by: Yury Iraheta MD  Authorized by: Yury Iraheta MD   Comparison: compared with previous ECG   Similar to previous ECG  Rhythm: sinus rhythm and sinus bradycardia  Rate: normal  BPM: 52  Conduction: conduction normal  QRS axis: normal  Other findings: non-specific ST-T wave changes    Clinical impression: abnormal EKG  Comments: Old inferior wall myocardial infarction and also put                  NOTE: The following portions of the patient's history were reviewed and updated this visit as appropriate: allergies, current medications, past family history, past medical history, past social history, past surgical history and problem list.

## 2022-05-25 RX ORDER — BACLOFEN 10 MG/1
TABLET ORAL
Qty: 90 TABLET | Refills: 0 | Status: SHIPPED | OUTPATIENT
Start: 2022-05-25 | End: 2022-06-24

## 2022-05-25 RX ORDER — PENTOXIFYLLINE 400 MG/1
TABLET, EXTENDED RELEASE ORAL
Qty: 90 TABLET | Refills: 0 | Status: SHIPPED | OUTPATIENT
Start: 2022-05-25 | End: 2022-06-23

## 2022-05-25 RX ORDER — CYANOCOBALAMIN 1000 UG/ML
INJECTION, SOLUTION INTRAMUSCULAR; SUBCUTANEOUS
Qty: 1 ML | Refills: 0 | Status: SHIPPED | OUTPATIENT
Start: 2022-05-25 | End: 2022-06-23

## 2022-06-23 RX ORDER — PENTOXIFYLLINE 400 MG/1
TABLET, EXTENDED RELEASE ORAL
Qty: 90 TABLET | Refills: 0 | Status: SHIPPED | OUTPATIENT
Start: 2022-06-23 | End: 2022-07-28 | Stop reason: SDUPTHER

## 2022-06-23 RX ORDER — CYANOCOBALAMIN 1000 UG/ML
INJECTION, SOLUTION INTRAMUSCULAR; SUBCUTANEOUS
Qty: 1 ML | Refills: 0 | Status: SHIPPED | OUTPATIENT
Start: 2022-06-23 | End: 2022-07-28 | Stop reason: SDUPTHER

## 2022-06-24 RX ORDER — BACLOFEN 10 MG/1
TABLET ORAL
Qty: 90 TABLET | Refills: 0 | Status: SHIPPED | OUTPATIENT
Start: 2022-06-24 | End: 2022-07-26

## 2022-06-30 ENCOUNTER — OFFICE VISIT (OUTPATIENT)
Dept: PAIN MEDICINE | Facility: CLINIC | Age: 72
End: 2022-06-30

## 2022-06-30 VITALS
HEART RATE: 68 BPM | OXYGEN SATURATION: 98 % | RESPIRATION RATE: 16 BRPM | HEIGHT: 68 IN | DIASTOLIC BLOOD PRESSURE: 81 MMHG | WEIGHT: 187 LBS | BODY MASS INDEX: 28.34 KG/M2 | SYSTOLIC BLOOD PRESSURE: 168 MMHG

## 2022-06-30 DIAGNOSIS — M47.812 CERVICAL SPONDYLOSIS WITHOUT MYELOPATHY: ICD-10-CM

## 2022-06-30 DIAGNOSIS — Z79.899 HIGH RISK MEDICATION USE: ICD-10-CM

## 2022-06-30 DIAGNOSIS — G89.29 CHRONIC LEFT SHOULDER PAIN: ICD-10-CM

## 2022-06-30 DIAGNOSIS — M47.817 LUMBOSACRAL SPONDYLOSIS WITHOUT MYELOPATHY: ICD-10-CM

## 2022-06-30 DIAGNOSIS — M79.2 NEUROPATHIC PAIN: ICD-10-CM

## 2022-06-30 DIAGNOSIS — M25.512 CHRONIC LEFT SHOULDER PAIN: ICD-10-CM

## 2022-06-30 DIAGNOSIS — G89.4 CHRONIC PAIN SYNDROME: Primary | ICD-10-CM

## 2022-06-30 PROCEDURE — 99214 OFFICE O/P EST MOD 30 MIN: CPT | Performed by: ANESTHESIOLOGY

## 2022-06-30 RX ORDER — MORPHINE SULFATE 15 MG/1
15 TABLET, FILM COATED, EXTENDED RELEASE ORAL 3 TIMES DAILY PRN
Qty: 90 TABLET | Refills: 0 | Status: SHIPPED | OUTPATIENT
Start: 2022-08-01 | End: 2022-08-30 | Stop reason: SDUPTHER

## 2022-06-30 RX ORDER — MORPHINE SULFATE 15 MG/1
15 TABLET, FILM COATED, EXTENDED RELEASE ORAL 3 TIMES DAILY
Qty: 90 TABLET | Refills: 0 | Status: SHIPPED | OUTPATIENT
Start: 2022-07-02 | End: 2022-08-30 | Stop reason: SDUPTHER
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
## 2022-07-19 ENCOUNTER — OFFICE VISIT (OUTPATIENT)
Dept: FAMILY MEDICINE CLINIC | Facility: CLINIC | Age: 72
End: 2022-07-19

## 2022-07-19 ENCOUNTER — LAB (OUTPATIENT)
Dept: LAB | Facility: HOSPITAL | Age: 72
End: 2022-07-19

## 2022-07-19 VITALS
BODY MASS INDEX: 28.79 KG/M2 | DIASTOLIC BLOOD PRESSURE: 81 MMHG | OXYGEN SATURATION: 98 % | SYSTOLIC BLOOD PRESSURE: 146 MMHG | HEIGHT: 68 IN | WEIGHT: 190 LBS | TEMPERATURE: 96.8 F | HEART RATE: 71 BPM

## 2022-07-19 DIAGNOSIS — I10 PRIMARY HYPERTENSION: Chronic | ICD-10-CM

## 2022-07-19 DIAGNOSIS — E53.8 B12 DEFICIENCY: Chronic | ICD-10-CM

## 2022-07-19 DIAGNOSIS — E11.65 TYPE 2 DIABETES MELLITUS WITH HYPERGLYCEMIA, WITHOUT LONG-TERM CURRENT USE OF INSULIN: Chronic | ICD-10-CM

## 2022-07-19 DIAGNOSIS — N40.0 BENIGN PROSTATIC HYPERPLASIA WITHOUT LOWER URINARY TRACT SYMPTOMS: Chronic | ICD-10-CM

## 2022-07-19 DIAGNOSIS — I25.10 CHRONIC CORONARY ARTERY DISEASE: Chronic | ICD-10-CM

## 2022-07-19 DIAGNOSIS — E11.65 TYPE 2 DIABETES MELLITUS WITH HYPERGLYCEMIA, WITHOUT LONG-TERM CURRENT USE OF INSULIN: Primary | Chronic | ICD-10-CM

## 2022-07-19 DIAGNOSIS — Z11.59 NEED FOR HEPATITIS C SCREENING TEST: ICD-10-CM

## 2022-07-19 DIAGNOSIS — E78.2 MIXED HYPERLIPIDEMIA: Chronic | ICD-10-CM

## 2022-07-19 LAB
ALBUMIN SERPL-MCNC: 4.7 G/DL (ref 3.5–5.2)
ALBUMIN/GLOB SERPL: 1.5 G/DL
ALP SERPL-CCNC: 114 U/L (ref 39–117)
ALT SERPL W P-5'-P-CCNC: 25 U/L (ref 1–41)
ANION GAP SERPL CALCULATED.3IONS-SCNC: 13 MMOL/L (ref 5–15)
AST SERPL-CCNC: 24 U/L (ref 1–40)
BILIRUB SERPL-MCNC: 0.5 MG/DL (ref 0–1.2)
BUN SERPL-MCNC: 18 MG/DL (ref 8–23)
BUN/CREAT SERPL: 14.5 (ref 7–25)
CALCIUM SPEC-SCNC: 9.6 MG/DL (ref 8.6–10.5)
CHLORIDE SERPL-SCNC: 102 MMOL/L (ref 98–107)
CHOLEST SERPL-MCNC: 142 MG/DL (ref 0–200)
CO2 SERPL-SCNC: 25 MMOL/L (ref 22–29)
CREAT SERPL-MCNC: 1.24 MG/DL (ref 0.76–1.27)
EGFRCR SERPLBLD CKD-EPI 2021: 61.8 ML/MIN/1.73
GLOBULIN UR ELPH-MCNC: 3.2 GM/DL
GLUCOSE SERPL-MCNC: 176 MG/DL (ref 65–99)
HBA1C MFR BLD: 6.9 % (ref 3.5–5.6)
HCV AB SER DONR QL: NORMAL
HDLC SERPL-MCNC: 55 MG/DL (ref 40–60)
LDLC SERPL CALC-MCNC: 64 MG/DL (ref 0–100)
LDLC/HDLC SERPL: 1.1 {RATIO}
POTASSIUM SERPL-SCNC: 4.8 MMOL/L (ref 3.5–5.2)
PROT SERPL-MCNC: 7.9 G/DL (ref 6–8.5)
SODIUM SERPL-SCNC: 140 MMOL/L (ref 136–145)
TRIGL SERPL-MCNC: 133 MG/DL (ref 0–150)
VIT B12 BLD-MCNC: 968 PG/ML (ref 211–946)
VLDLC SERPL-MCNC: 23 MG/DL (ref 5–40)

## 2022-07-19 PROCEDURE — 1170F FXNL STATUS ASSESSED: CPT | Performed by: NURSE PRACTITIONER

## 2022-07-19 PROCEDURE — 1160F RVW MEDS BY RX/DR IN RCRD: CPT | Performed by: NURSE PRACTITIONER

## 2022-07-19 PROCEDURE — 82607 VITAMIN B-12: CPT | Performed by: NURSE PRACTITIONER

## 2022-07-19 PROCEDURE — 99214 OFFICE O/P EST MOD 30 MIN: CPT | Performed by: NURSE PRACTITIONER

## 2022-07-19 PROCEDURE — 36415 COLL VENOUS BLD VENIPUNCTURE: CPT

## 2022-07-19 PROCEDURE — 1125F AMNT PAIN NOTED PAIN PRSNT: CPT | Performed by: NURSE PRACTITIONER

## 2022-07-19 PROCEDURE — 83036 HEMOGLOBIN GLYCOSYLATED A1C: CPT

## 2022-07-19 PROCEDURE — 86803 HEPATITIS C AB TEST: CPT

## 2022-07-19 PROCEDURE — 80053 COMPREHEN METABOLIC PANEL: CPT

## 2022-07-19 PROCEDURE — G0439 PPPS, SUBSEQ VISIT: HCPCS | Performed by: NURSE PRACTITIONER

## 2022-07-19 PROCEDURE — 96160 PT-FOCUSED HLTH RISK ASSMT: CPT | Performed by: NURSE PRACTITIONER

## 2022-07-19 PROCEDURE — 80061 LIPID PANEL: CPT

## 2022-07-19 RX ORDER — GABAPENTIN ENACARBIL 300 MG/1
TABLET, EXTENDED RELEASE ORAL
COMMUNITY
Start: 2022-07-01 | End: 2022-12-01 | Stop reason: SDUPTHER

## 2022-07-19 NOTE — PROGRESS NOTES
The ABCs of the Annual Wellness Visit  Subsequent Medicare Wellness Visit    Chief Complaint   Patient presents with   • Medicare Wellness-subsequent     Sub medicare wellness   • Hypertension   • Hyperlipidemia     3 month f/u      Subjective    History of Present Illness:  Merlin Trujillo is a 72 y.o. male who presents for a Subsequent Medicare Wellness Visit.    Patient is here for management of his chronic medical problems: hypertension, diabetes, hyperlipidemia, chronic pain. CAD, BPH, CKD stage 3, neuropathy.     Diabetes he said he restarted metformin 500 mg bid and glipizide 10 mg bid. He is checking less than 160 . No lows A1C 7.3. he said cardiology told him ok restart metformin.     chronic pain neck and back followed by pain management. Taking morphine 15 mmg tid horizant 300 mg daily gabapentin 600 mg tid. Baclofen 10 mg tid.   amitiza for constipation.     Constipation taking amitiza 24 mg bid. Is on morphine for chronic pain.     B12 def stopped B12 las labs high 1949 .     Hypertension: amlodine 5 mg bid.  Atenolol 25 mg bid     Hyperlipidemia taking atorvastatin 40 mg at night.     Microalbuminuria: 46.5 high microalbumin 6.3 high.     CAD taking aspirin 81 mg daily is followed by Dr Pozo. Reviewed notes [id number]. Stress test may 2021 with normal LV systolic function ER 70% no inducible ischemia.   chronic renal insufficiency wit improvement in creatine.     BPH followed by urology.      The following portions of the patient's history were reviewed and   updated as appropriate: allergies, current medications, past family history, past medical history, past social history, past surgical history and problem list.    Compared to one year ago, the patient feels his physical   health is better.    Compared to one year ago, the patient feels his mental   health is the same.    Recent Hospitalizations:  He was not admitted to the hospital during the last year.       Current Medical Providers:  Patient  "Care Team:  Court Anderson APRN as PCP - General (Nurse Practitioner)  Alice Bagley MD as Consulting Physician (Pain Medicine)  Yury Iraheta MD as Consulting Physician (Cardiology)  Edgar Abdi MD as Consulting Physician (Neurology)  Joseph Abdi MD as Consulting Physician (Urology)    Outpatient Medications Prior to Visit   Medication Sig Dispense Refill   • Accu-Chek FastClix Lancets misc TEST BLOOD SUGAR ONCE DAILY AS DIRECTED 102 each 0   • Accu-Chek Guide test strip TEST ONCE DAILY AS DIRECTED 100 each 3   • amLODIPine (NORVASC) 5 MG tablet Take 1 tablet by mouth 2 (Two) Times a Day. 180 tablet 1   • aspirin 81 MG EC tablet Take 81 mg by mouth Daily.     • atenolol (TENORMIN) 50 MG tablet Take 0.5 tablets by mouth 2 (Two) Times a Day. 90 tablet 1   • atorvastatin (LIPITOR) 40 MG tablet Take 1 tablet by mouth every night at bedtime. 90 tablet 1   • B-D 3CC LUER-RONY SYR 25GX1\" 25G X 1\" 3 ML misc USE AS DIRECTED WITH B-12 INJECTION 100 each 3   • baclofen (LIORESAL) 10 MG tablet TAKE ONE TABLET BY MOUTH THREE TIMES DAILY 90 tablet 0   • cholecalciferol (VITAMIN D3) 10 MCG (400 UNIT) tablet Take 400 Units by mouth Daily. 1000 units     • cyanocobalamin 1000 MCG/ML injection INJECT 1ML MONTHLY 1 mL 0   • Gabapentin Enacarbil  MG tablet controlled-release Take 600 mg by mouth 3 (Three) Times a Day As Needed (pain). 90 tablet 11   • glipizide (GLUCOTROL) 10 MG tablet Take 1 tablet by mouth 2 (Two) Times a Day Before Meals. 180 tablet 1   • Horizant 300 MG tablet controlled-release      • lubiprostone (Amitiza) 24 MCG capsule Take 1 capsule by mouth 2 (Two) Times a Day. 180 capsule 1   • metFORMIN (GLUCOPHAGE) 500 MG tablet Take 500 mg by mouth 2 (Two) Times a Day.     • [START ON 8/1/2022] Morphine (MS CONTIN) 15 MG 12 hr tablet Take 1 tablet by mouth 3 (Three) Times a Day As Needed for Severe Pain . DNF before 8/1/2022 90 tablet 0   • multivitamin (THERAGRAN) tablet tablet Take  by " "mouth Daily. Calcium+magnesium+zinc  \"Sometimes\"     • NON FORMULARY Comfort guardian 24-- for pain - ordered meds-- helps with arm pain     • pentoxifylline (TRENtal) 400 MG CR tablet TAKE ONE TABLET BY MOUTH THREE TIMES DAILY 90 tablet 0   • Morphine (MS CONTIN) 15 MG 12 hr tablet Take 1 tablet by mouth 3 (Three) Times a Day. 90 tablet 0     No facility-administered medications prior to visit.       Opioid medication/s are on active medication list.  and I have evaluated his active treatment plan and pain score trends (see table).  Vitals:    07/19/22 0800   PainSc:   7   PainLoc: Arm     I have reviewed the chart for potential of high risk medication and harmful drug interactions in the elderly.            Aspirin is on active medication list. Aspirin use is indicated based on review of current medical condition/s. Pros and cons of this therapy have been discussed today. Benefits of this medication outweigh potential harm.  Patient has been encouraged to continue taking this medication.  .      Patient Active Problem List   Diagnosis   • Neuropathic pain   • Other long term (current) drug therapy   • Abnormal results of kidney function studies   • Adenomatous polyp of colon   • Arthralgia of right ankle   • Bilateral carotid artery stenosis   • Cervical radiculitis   • Chronic coronary artery disease   • Chronic kidney disease, stage III (moderate) ([facility])   • Chronic pain   • Edema leg   • BPH (benign prostatic hyperplasia)   • Epidermoid cyst of neck   • Family history of diabetes mellitus   • Family history of stroke   • Fungal dermatitis   • Hearing loss   • Type 2 diabetes mellitus with hyperglycemia ([facility])   • Hyperlipidemia   • Hypertension   • Neck pain   • Low back pain   • Osteoarthritis of cervical 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   • Shoulder pain   • Sleep disorder   • Speech or language deficit following cerebrovascular accident   • Unsteady gait   • Left median nerve neuropathy   • Radial neuropathy   • " "Osteoarthritis of left shoulder   • Tear of left rotator cuff   • Ulnar neuropathy of left upper extremity   • Medicare annual wellness visit, subsequent   • Cholesterol retinal embolus   • Partial retinal artery occlusion   • Closed fracture of thoracic vertebra without spinal cord injury (HCC)   • Lumbosacral spondylosis without myelopathy   • Keratotic lesion   • Need for hepatitis C screening test   • B12 deficiency     Advance Care Planning  Advance Directive is not on file.  ACP discussion was held with the patient during this visit. Patient does not have an advance directive, information provided.    Review of Systems   HENT:        Full dentures   Eyes:        Wears glasses he is due to schedule.    Respiratory: Negative for cough and wheezing.    Cardiovascular: Negative for chest pain, palpitations and leg swelling.   Gastrointestinal: Positive for constipation. Negative for abdominal pain, anal bleeding and blood in stool.   Endocrine: Negative.    Genitourinary: Negative for difficulty urinating, dysuria and flank pain.   Skin:        He saw dermatology recently .   He is treating lesion left chest with medications   Allergic/Immunologic: Negative.    Neurological: Negative for tremors, seizures and confusion.   Hematological: Negative.    Psychiatric/Behavioral: Negative for behavioral problems, dysphoric mood and hallucinations.        Objective    Vitals:    07/19/22 0800   BP: 146/81   BP Location: Right arm   Patient Position: Sitting   Cuff Size: Adult   Pulse: 71   Temp: 96.8 °F (36 °C)   TempSrc: Infrared   SpO2: 98%   Weight: 86.2 kg (190 lb)   Height: 172.7 cm (68\")   PainSc:   7   PainLoc: Arm     Estimated body mass index is 28.89 kg/m² as calculated from the following:    Height as of this encounter: 172.7 cm (68\").    Weight as of this encounter: 86.2 kg (190 lb).    BMI is >= 25 and <30. (Overweight) The following options were offered after discussion;: nutrition " counseling/recommendations      Does the patient have evidence of cognitive impairment? No    Physical Exam  Vitals and nursing note reviewed.   Cardiovascular:      Pulses:           Dorsalis pedis pulses are 2+ on the right side and 2+ on the left side.        Posterior tibial pulses are 2+ on the left side.   Musculoskeletal:      Right foot: Normal range of motion.      Left foot: Normal range of motion.        Feet:    Feet:      Right foot:      Protective Sensation: 10 sites tested. 10 sites sensed.      Skin integrity: Dry skin present.      Toenail Condition: Fungal disease present.     Left foot:      Protective Sensation: 10 sites tested. 10 sites sensed.      Skin integrity: Dry skin present.      Comments: Right ankle internal fixation.                HEALTH RISK ASSESSMENT    Smoking Status:  Social History     Tobacco Use   Smoking Status Never Smoker   Smokeless Tobacco Never Used     Alcohol Consumption:  Social History     Substance and Sexual Activity   Alcohol Use No     Fall Risk Screen:    STEADI Fall Risk Assessment was completed, and patient is at LOW risk for falls.Assessment completed on:7/19/2022    Depression Screening:  PHQ-2/PHQ-9 Depression Screening 7/19/2022   Retired PHQ-9 Total Score -   Retired Total Score -   Little Interest or Pleasure in Doing Things 0-->not at all   Feeling Down, Depressed or Hopeless 0-->not at all   PHQ-9: Brief Depression Severity Measure Score 0       Health Habits and Functional and Cognitive Screening:  Functional & Cognitive Status 7/19/2022   Do you have difficulty preparing food and eating? No   Do you have difficulty bathing yourself, getting dressed or grooming yourself? No   Do you have difficulty using the toilet? No   Do you have difficulty moving around from place to place? No   Do you have trouble with steps or getting out of a bed or a chair? No   Current Diet Well Balanced Diet   Dental Exam Not up to date   Eye Exam Not up to date        Eye  Exam Comment -   Exercise (times per week) 7 times per week   Current Exercises Include Walking   Current Exercise Activities Include -   Do you need help using the phone?  No   Are you deaf or do you have serious difficulty hearing?  Yes   Do you need help with transportation? No   Do you need help shopping? No   Do you need help preparing meals?  No   Do you need help with housework?  No   Do you need help with laundry? No   Do you need help taking your medications? No   Do you need help managing money? No   Do you ever drive or ride in a car without wearing a seat belt? No   Have you felt unusual stress, anger or loneliness in the last month? No   Who do you live with? Spouse   If you need help, do you have trouble finding someone available to you? No   Have you been bothered in the last four weeks by sexual problems? No   Do you have difficulty concentrating, remembering or making decisions? Yes       Age-appropriate Screening Schedule:  Refer to the list below for future screening recommendations based on patient's age, sex and/or medical conditions. Orders for these recommended tests are listed in the plan section. The patient has been provided with a written plan.    Health Maintenance   Topic Date Due   • TDAP/TD VACCINES (1 - Tdap) Never done   • DIABETIC EYE EXAM  06/25/2019   • DIABETIC FOOT EXAM  08/31/2021   • HEMOGLOBIN A1C  09/03/2022   • INFLUENZA VACCINE  10/01/2022   • LIPID PANEL  03/03/2023   • URINE MICROALBUMIN  04/19/2023   • ZOSTER VACCINE  Completed              Assessment & Plan   CMS Preventative Services Quick Reference  Risk Factors Identified During Encounter  Hearing Problem  The above risks/problems have been discussed with the patient.  Follow up actions/plans if indicated are seen below in the Assessment/Plan Section.  Pertinent information has been shared with the patient in the After Visit Summary.    Diagnoses and all orders for this visit:    1. Type 2 diabetes mellitus with  hyperglycemia, without long-term current use of insulin (HCC) (Primary)  Comments:  labs ordered.   Orders:  -     Lipid Panel; Future  -     Comprehensive Metabolic Panel; Future  -     Hemoglobin A1c; Future    2. Chronic coronary artery disease  Comments:  stable    3. Mixed hyperlipidemia  Comments:  stable  Orders:  -     Lipid Panel; Future  -     Comprehensive Metabolic Panel; Future    4. Primary hypertension  Comments:  stable  Orders:  -     Hepatitis C antibody; Future  -     Comprehensive Metabolic Panel; Future  -     Vitamin B12  -     Hemoglobin A1c; Future    5. Benign prostatic hyperplasia without lower urinary tract symptoms  Comments:  stable followed by urology    6. Need for hepatitis C screening test  Comments:  ordered lab today  Orders:  -     Hepatitis C antibody; Future    7. B12 deficiency  Comments:  labs ordered  Orders:  -     Vitamin B12        Follow Up:   Return in about 6 months (around 1/19/2023).     An After Visit Summary and PPPS were made available to the patient.

## 2022-07-19 NOTE — SIGNIFICANT NOTE
His wife can help him  if having difficulty he is doing cross word puzzles and things to keep brain active

## 2022-07-19 NOTE — PROGRESS NOTES
"Subjective        Merlin Trujillo is a 72 y.o. male.     Chief Complaint   Patient presents with   • Medicare Wellness-subsequent     Sub medicare wellness   • Hypertension   • Hyperlipidemia     3 month f/u       History of Present Illness    The following portions of the patient's history were reviewed and updated as appropriate: allergies, current medications, past family history, past medical history, past social history, past surgical history and problem list.      Current Outpatient Medications:   •  Accu-Chek FastClix Lancets misc, TEST BLOOD SUGAR ONCE DAILY AS DIRECTED, Disp: 102 each, Rfl: 0  •  Accu-Chek Guide test strip, TEST ONCE DAILY AS DIRECTED, Disp: 100 each, Rfl: 3  •  amLODIPine (NORVASC) 5 MG tablet, Take 1 tablet by mouth 2 (Two) Times a Day., Disp: 180 tablet, Rfl: 1  •  aspirin 81 MG EC tablet, Take 81 mg by mouth Daily., Disp: , Rfl:   •  atenolol (TENORMIN) 50 MG tablet, Take 0.5 tablets by mouth 2 (Two) Times a Day., Disp: 90 tablet, Rfl: 1  •  atorvastatin (LIPITOR) 40 MG tablet, Take 1 tablet by mouth every night at bedtime., Disp: 90 tablet, Rfl: 1  •  B-D 3CC LUER-RONY SYR 25GX1\" 25G X 1\" 3 ML misc, USE AS DIRECTED WITH B-12 INJECTION, Disp: 100 each, Rfl: 3  •  baclofen (LIORESAL) 10 MG tablet, TAKE ONE TABLET BY MOUTH THREE TIMES DAILY, Disp: 90 tablet, Rfl: 0  •  cholecalciferol (VITAMIN D3) 10 MCG (400 UNIT) tablet, Take 400 Units by mouth Daily. 1000 units, Disp: , Rfl:   •  cyanocobalamin 1000 MCG/ML injection, INJECT 1ML MONTHLY, Disp: 1 mL, Rfl: 0  •  Gabapentin Enacarbil  MG tablet controlled-release, Take 600 mg by mouth 3 (Three) Times a Day As Needed (pain)., Disp: 90 tablet, Rfl: 11  •  glipizide (GLUCOTROL) 10 MG tablet, Take 1 tablet by mouth 2 (Two) Times a Day Before Meals., Disp: 180 tablet, Rfl: 1  •  lubiprostone (Amitiza) 24 MCG capsule, Take 1 capsule by mouth 2 (Two) Times a Day., Disp: 180 capsule, Rfl: 1  •  metFORMIN (GLUCOPHAGE) 500 MG tablet, , Disp: , " "Rfl:   •  [START ON 8/1/2022] Morphine (MS CONTIN) 15 MG 12 hr tablet, Take 1 tablet by mouth 3 (Three) Times a Day As Needed for Severe Pain . DNF before 8/1/2022, Disp: 90 tablet, Rfl: 0  •  multivitamin (THERAGRAN) tablet tablet, Take  by mouth Daily. Calcium+magnesium+zinc \"Sometimes\", Disp: , Rfl:   •  NON FORMULARY, Comfort guardian 24-- for pain - ordered meds-- helps with arm pain, Disp: , Rfl:   •  pentoxifylline (TRENtal) 400 MG CR tablet, TAKE ONE TABLET BY MOUTH THREE TIMES DAILY, Disp: 90 tablet, Rfl: 0  •  Morphine (MS CONTIN) 15 MG 12 hr tablet, Take 1 tablet by mouth 3 (Three) Times a Day., Disp: 90 tablet, Rfl: 0    Recent Results (from the past 4032 hour(s))   POC Glycosylated Hemoglobin (Hb A1C)    Collection Time: 03/03/22  1:36 PM    Specimen: Blood   Result Value Ref Range    Hemoglobin A1C 7.3 %    Lot Number [id number]     Expiration Date 2,023/11    CBC & Differential    Collection Time: 03/03/22  1:47 PM    Specimen: Blood   Result Value Ref Range    WBC 7.8 3.4 - 10.8 x10E3/uL    RBC 4.68 4.14 - 5.80 x10E6/uL    Hemoglobin 13.7 13.0 - 17.7 g/dL    Hematocrit 40.5 37.5 - 51.0 %    MCV 87 79 - 97 fL    MCH 29.3 26.6 - 33.0 pg    MCHC 33.8 31.5 - 35.7 g/dL    RDW 13.0 11.6 - 15.4 %    Platelets 332 150 - 450 x10E3/uL    Neutrophil Rel % 72 Not Estab. %    Lymphocyte Rel % 19 Not Estab. %    Monocyte Rel % 7 Not Estab. %    Eosinophil Rel % 2 Not Estab. %    Basophil Rel % 0 Not Estab. %    Neutrophils Absolute 5.6 1.4 - 7.0 x10E3/uL    Lymphocytes Absolute 1.5 0.7 - 3.1 x10E3/uL    Monocytes Absolute 0.6 0.1 - 0.9 x10E3/uL    Eosinophils Absolute 0.1 0.0 - 0.4 x10E3/uL    Basophils Absolute 0.0 0.0 - 0.2 x10E3/uL    Immature Granulocyte Rel % 0 Not Estab. %    Immature Grans Absolute 0.0 0.0 - 0.1 x10E3/uL   Comprehensive Metabolic Panel    Collection Time: 03/03/22  1:47 PM    Specimen: Blood   Result Value Ref Range    Glucose 153 (H) 65 - 99 mg/dL    BUN 14 8 - 27 mg/dL    Creatinine 1.30 (H) " 0.76 - 1.27 mg/dL    EGFR Result 58 (L) >59 mL/min/1.73    BUN/Creatinine Ratio 11 10 - 24    Sodium 138 134 - 144 mmol/L    Potassium 4.2 3.5 - 5.2 mmol/L    Chloride 99 96 - 106 mmol/L    Total CO2 23 20 - 29 mmol/L    Calcium 9.4 8.6 - 10.2 mg/dL    Total Protein 7.8 6.0 - 8.5 g/dL    Albumin 4.6 3.7 - 4.7 g/dL    Globulin 3.2 1.5 - 4.5 g/dL    A/G Ratio 1.4 1.2 - 2.2    Total Bilirubin 0.6 0.0 - 1.2 mg/dL    Alkaline Phosphatase 114 44 - 121 IU/L    AST (SGOT) 24 0 - 40 IU/L    ALT (SGPT) 23 0 - 44 IU/L   Lipid Panel    Collection Time: 03/03/22  1:47 PM    Specimen: Blood   Result Value Ref Range    Total Cholesterol 147 100 - 199 mg/dL    Triglycerides 111 0 - 149 mg/dL    HDL Cholesterol 58 >39 mg/dL    VLDL Cholesterol Shilo 20 5 - 40 mg/dL    LDL Chol Calc (NIH) 69 0 - 99 mg/dL   Vitamin B12    Collection Time: 04/19/22 10:15 AM    Specimen: Blood   Result Value Ref Range    Vitamin B-12 1,949 (H) 211 - 946 pg/mL   Microalbumin / Creatinine Urine Ratio - Urine, Clean Catch    Collection Time: 04/19/22 10:15 AM    Specimen: Urine, Clean Catch   Result Value Ref Range    Microalbumin/Creatinine Ratio 46.5 mg/g    Creatinine, Urine 135.6 mg/dL    Microalbumin, Urine 6.3 mg/dL   Vitamin D 25 Hydroxy    Collection Time: 04/19/22 10:15 AM    Specimen: Blood   Result Value Ref Range    25 Hydroxy, Vitamin D 35.8 30.0 - 100.0 ng/ml   Basic Metabolic Panel    Collection Time: 04/19/22 10:15 AM    Specimen: Blood   Result Value Ref Range    Glucose 209 (H) 65 - 99 mg/dL    BUN 15 8 - 23 mg/dL    Creatinine 1.14 0.76 - 1.27 mg/dL    Sodium 140 136 - 145 mmol/L    Potassium 4.2 3.5 - 5.2 mmol/L    Chloride 103 98 - 107 mmol/L    CO2 23.5 22.0 - 29.0 mmol/L    Calcium 9.6 8.6 - 10.5 mg/dL    BUN/Creatinine Ratio 13.2 7.0 - 25.0    Anion Gap 13.5 5.0 - 15.0 mmol/L    eGFR 68.3 >60.0 mL/min/1.73   Comprehensive Metabolic Panel    Collection Time: 05/07/22  2:24 PM    Specimen: Blood   Result Value Ref Range    Glucose 201  "(H) 65 - 99 mg/dL    BUN 13 8 - 23 mg/dL    Creatinine 0.93 0.76 - 1.27 mg/dL    Sodium 134 (L) 136 - 145 mmol/L    Potassium 3.8 3.5 - 5.2 mmol/L    Chloride 100 98 - 107 mmol/L    CO2 19.0 (L) 22.0 - 29.0 mmol/L    Calcium 8.9 8.6 - 10.5 mg/dL    Total Protein 7.5 6.0 - 8.5 g/dL    Albumin 3.90 3.50 - 5.20 g/dL    ALT (SGPT) 12 1 - 41 U/L    AST (SGOT) 11 1 - 40 U/L    Alkaline Phosphatase 120 (H) 39 - 117 U/L    Total Bilirubin 0.8 0.0 - 1.2 mg/dL    Globulin 3.6 gm/dL    A/G Ratio 1.1 g/dL    BUN/Creatinine Ratio 14.0 7.0 - 25.0    Anion Gap 15.0 5.0 - 15.0 mmol/L    eGFR 87.2 >60.0 mL/min/1.73   CBC Auto Differential    Collection Time: 05/07/22  3:15 PM    Specimen: Arm, Right; Blood   Result Value Ref Range    WBC 11.60 (H) 3.40 - 10.80 10*3/mm3    RBC 4.56 4.14 - 5.80 10*6/mm3    Hemoglobin 13.2 13.0 - 17.7 g/dL    Hematocrit 38.4 37.5 - 51.0 %    MCV 84.3 79.0 - 97.0 fL    MCH 29.0 26.6 - 33.0 pg    MCHC 34.4 31.5 - 35.7 g/dL    RDW 13.4 12.3 - 15.4 %    RDW-SD 39.8 37.0 - 54.0 fl    MPV 7.3 6.0 - 12.0 fL    Platelets 244 140 - 450 10*3/mm3    Neutrophil % 82.6 (H) 42.7 - 76.0 %    Lymphocyte % 8.1 (L) 19.6 - 45.3 %    Monocyte % 8.5 5.0 - 12.0 %    Eosinophil % 0.4 0.3 - 6.2 %    Basophil % 0.4 0.0 - 1.5 %    Neutrophils, Absolute 9.60 (H) 1.70 - 7.00 10*3/mm3    Lymphocytes, Absolute 0.90 0.70 - 3.10 10*3/mm3    Monocytes, Absolute 1.00 (H) 0.10 - 0.90 10*3/mm3    Eosinophils, Absolute 0.00 0.00 - 0.40 10*3/mm3    Basophils, Absolute 0.00 0.00 - 0.20 10*3/mm3    nRBC 0.0 0.0 - 0.2 /100 WBC         Review of Systems    Objective     /81 (BP Location: Right arm, Patient Position: Sitting, Cuff Size: Adult)   Pulse 71   Temp 96.8 °F (36 °C) (Infrared)   Ht 172.7 cm (68\")   Wt 86.2 kg (190 lb)   SpO2 98%   BMI 28.89 kg/m²     Physical Exam    Result Review :                Assessment & Plan    There are no diagnoses linked to this encounter.  There are no Patient Instructions on file for this " visit.    Follow Up   No follow-ups on file.    Patient was given instructions and counseling regarding his condition or for health maintenance advice. Please see specific information pulled into the AVS if appropriate.     Court Anderson, APRN    07/19/22

## 2022-07-22 RX ORDER — PENTOXIFYLLINE 400 MG/1
TABLET, EXTENDED RELEASE ORAL
Qty: 90 TABLET | Refills: 0 | OUTPATIENT
Start: 2022-07-22

## 2022-07-22 RX ORDER — CYANOCOBALAMIN 1000 UG/ML
INJECTION, SOLUTION INTRAMUSCULAR; SUBCUTANEOUS
Qty: 1 ML | Refills: 0 | OUTPATIENT
Start: 2022-07-22

## 2022-07-26 RX ORDER — BACLOFEN 10 MG/1
TABLET ORAL
Qty: 90 TABLET | Refills: 0 | Status: SHIPPED | OUTPATIENT
Start: 2022-07-26 | End: 2022-08-30 | Stop reason: SDUPTHER

## 2022-07-28 ENCOUNTER — TELEPHONE (OUTPATIENT)
Dept: FAMILY MEDICINE CLINIC | Facility: CLINIC | Age: 72
End: 2022-07-28

## 2022-07-28 RX ORDER — PENTOXIFYLLINE 400 MG/1
400 TABLET, EXTENDED RELEASE ORAL 3 TIMES DAILY
Qty: 270 TABLET | Refills: 3 | Status: SHIPPED | OUTPATIENT
Start: 2022-07-28

## 2022-07-28 RX ORDER — CYANOCOBALAMIN 1000 UG/ML
1000 INJECTION, SOLUTION INTRAMUSCULAR; SUBCUTANEOUS ONCE
Qty: 1 ML | Refills: 6 | Status: SHIPPED | OUTPATIENT
Start: 2022-07-28 | End: 2022-07-28

## 2022-07-28 NOTE — TELEPHONE ENCOUNTER
- pentoxifylline (TRENtal) 400 MG CR tablet [   - cyanocobalamin 1000 MCG/ML injection   [facility]

## 2022-08-19 RX ORDER — BACLOFEN 10 MG/1
TABLET ORAL
Qty: 90 TABLET | Refills: 0 | OUTPATIENT
Start: 2022-08-19

## 2022-08-25 RX ORDER — LUBIPROSTONE 24 UG/1
24 CAPSULE ORAL 2 TIMES DAILY
Qty: 180 CAPSULE | Refills: 0 | OUTPATIENT
Start: 2022-08-25

## 2022-08-30 ENCOUNTER — OFFICE VISIT (OUTPATIENT)
Dept: PAIN MEDICINE | Facility: CLINIC | Age: 72
End: 2022-08-30

## 2022-08-30 VITALS
RESPIRATION RATE: 16 BRPM | SYSTOLIC BLOOD PRESSURE: 141 MMHG | HEART RATE: 61 BPM | DIASTOLIC BLOOD PRESSURE: 70 MMHG | OXYGEN SATURATION: 97 %

## 2022-08-30 DIAGNOSIS — G89.4 CHRONIC PAIN SYNDROME: ICD-10-CM

## 2022-08-30 DIAGNOSIS — M25.512 CHRONIC LEFT SHOULDER PAIN: ICD-10-CM

## 2022-08-30 DIAGNOSIS — Z79.899 HIGH RISK MEDICATION USE: Primary | ICD-10-CM

## 2022-08-30 DIAGNOSIS — M47.812 CERVICAL SPONDYLOSIS WITHOUT MYELOPATHY: ICD-10-CM

## 2022-08-30 DIAGNOSIS — M47.817 LUMBOSACRAL SPONDYLOSIS WITHOUT MYELOPATHY: ICD-10-CM

## 2022-08-30 DIAGNOSIS — M79.2 NEUROPATHIC PAIN: ICD-10-CM

## 2022-08-30 DIAGNOSIS — G89.29 CHRONIC LEFT SHOULDER PAIN: ICD-10-CM

## 2022-08-30 PROCEDURE — 99214 OFFICE O/P EST MOD 30 MIN: CPT | Performed by: ANESTHESIOLOGY

## 2022-08-30 RX ORDER — BACLOFEN 10 MG/1
TABLET ORAL
COMMUNITY
Start: 2022-06-24 | End: 2022-10-25

## 2022-08-30 RX ORDER — CYANOCOBALAMIN 1000 UG/ML
INJECTION, SOLUTION INTRAMUSCULAR; SUBCUTANEOUS
COMMUNITY
Start: 2022-08-25 | End: 2023-02-16

## 2022-08-30 RX ORDER — MORPHINE SULFATE 15 MG/1
15 TABLET, FILM COATED, EXTENDED RELEASE ORAL 3 TIMES DAILY PRN
Qty: 90 TABLET | Refills: 0 | Status: SHIPPED | OUTPATIENT
Start: 2022-09-29 | End: 2022-10-20 | Stop reason: SDUPTHER

## 2022-08-30 RX ORDER — LUBIPROSTONE 24 UG/1
24 CAPSULE ORAL 2 TIMES DAILY
Qty: 180 CAPSULE | Refills: 3 | Status: SHIPPED | OUTPATIENT
Start: 2022-08-30

## 2022-08-30 RX ORDER — MORPHINE SULFATE 15 MG/1
15 TABLET, FILM COATED, EXTENDED RELEASE ORAL 3 TIMES DAILY
Qty: 90 TABLET | Refills: 0 | Status: SHIPPED | OUTPATIENT
Start: 2022-08-30 | End: 2022-10-20 | Stop reason: SDUPTHER
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
## 2022-08-31 RX ORDER — BACLOFEN 10 MG/1
TABLET ORAL
Qty: 90 TABLET | Refills: 0 | OUTPATIENT
Start: 2022-08-31

## 2022-09-01 ENCOUNTER — TELEPHONE (OUTPATIENT)
Dept: FAMILY MEDICINE CLINIC | Facility: CLINIC | Age: 72
End: 2022-09-01

## 2022-09-06 RX ORDER — BLOOD SUGAR DIAGNOSTIC
STRIP MISCELLANEOUS
OUTPATIENT
Start: 2022-09-06

## 2022-09-20 ENCOUNTER — TELEPHONE (OUTPATIENT)
Dept: FAMILY MEDICINE CLINIC | Facility: CLINIC | Age: 72
End: 2022-09-20

## 2022-09-20 RX ORDER — AMLODIPINE BESYLATE 5 MG/1
TABLET ORAL
Qty: 180 TABLET | Refills: 0 | OUTPATIENT
Start: 2022-09-20

## 2022-09-20 RX ORDER — ATORVASTATIN CALCIUM 40 MG/1
TABLET, FILM COATED ORAL
Qty: 90 TABLET | Refills: 0 | OUTPATIENT
Start: 2022-09-20

## 2022-09-20 RX ORDER — ATENOLOL 50 MG/1
TABLET ORAL
Qty: 90 TABLET | Refills: 0 | OUTPATIENT
Start: 2022-09-20

## 2022-09-22 RX ORDER — ATENOLOL 50 MG/1
TABLET ORAL
Qty: 90 TABLET | Refills: 0 | Status: SHIPPED | OUTPATIENT
Start: 2022-09-22 | End: 2022-10-25

## 2022-09-22 RX ORDER — ATORVASTATIN CALCIUM 40 MG/1
TABLET, FILM COATED ORAL
Qty: 90 TABLET | Refills: 0 | Status: SHIPPED | OUTPATIENT
Start: 2022-09-22 | End: 2022-10-25

## 2022-09-22 RX ORDER — AMLODIPINE BESYLATE 5 MG/1
TABLET ORAL
Qty: 180 TABLET | Refills: 0 | Status: SHIPPED | OUTPATIENT
Start: 2022-09-22 | End: 2022-10-25

## 2022-09-27 ENCOUNTER — TELEPHONE (OUTPATIENT)
Dept: FAMILY MEDICINE CLINIC | Facility: CLINIC | Age: 72
End: 2022-09-27

## 2022-09-27 RX ORDER — SYRINGE WITH NEEDLE, 1 ML 25GX5/8"
SYRINGE, EMPTY DISPOSABLE MISCELLANEOUS
Qty: 12 EACH | Refills: 3 | Status: SHIPPED | OUTPATIENT
Start: 2022-09-27

## 2022-09-29 ENCOUNTER — TELEPHONE (OUTPATIENT)
Dept: FAMILY MEDICINE CLINIC | Facility: CLINIC | Age: 72
End: 2022-09-29

## 2022-09-30 RX ORDER — LANCETS
EACH MISCELLANEOUS
Qty: 102 EACH | Refills: 0 | Status: SHIPPED | OUTPATIENT
Start: 2022-09-30

## 2022-10-20 ENCOUNTER — OFFICE VISIT (OUTPATIENT)
Dept: PAIN MEDICINE | Facility: CLINIC | Age: 72
End: 2022-10-20

## 2022-10-20 VITALS — DIASTOLIC BLOOD PRESSURE: 72 MMHG | HEART RATE: 63 BPM | SYSTOLIC BLOOD PRESSURE: 138 MMHG | RESPIRATION RATE: 16 BRPM

## 2022-10-20 DIAGNOSIS — M79.2 NEUROPATHIC PAIN: ICD-10-CM

## 2022-10-20 DIAGNOSIS — G89.4 CHRONIC PAIN SYNDROME: Primary | ICD-10-CM

## 2022-10-20 DIAGNOSIS — M47.817 LUMBOSACRAL SPONDYLOSIS WITHOUT MYELOPATHY: ICD-10-CM

## 2022-10-20 DIAGNOSIS — M25.512 CHRONIC LEFT SHOULDER PAIN: ICD-10-CM

## 2022-10-20 DIAGNOSIS — Z79.899 HIGH RISK MEDICATION USE: ICD-10-CM

## 2022-10-20 DIAGNOSIS — M47.812 CERVICAL SPONDYLOSIS WITHOUT MYELOPATHY: ICD-10-CM

## 2022-10-20 DIAGNOSIS — G89.29 CHRONIC LEFT SHOULDER PAIN: ICD-10-CM

## 2022-10-20 PROCEDURE — 99214 OFFICE O/P EST MOD 30 MIN: CPT | Performed by: ANESTHESIOLOGY

## 2022-10-20 RX ORDER — MORPHINE SULFATE 15 MG/1
15 TABLET, FILM COATED, EXTENDED RELEASE ORAL 3 TIMES DAILY PRN
Qty: 90 TABLET | Refills: 0 | Status: SHIPPED | OUTPATIENT
Start: 2022-11-27 | End: 2022-12-22 | Stop reason: SDUPTHER

## 2022-10-20 RX ORDER — MORPHINE SULFATE 15 MG/1
15 TABLET, FILM COATED, EXTENDED RELEASE ORAL 3 TIMES DAILY
Qty: 90 TABLET | Refills: 0 | Status: SHIPPED | OUTPATIENT
Start: 2022-10-28 | End: 2022-12-22 | Stop reason: SDUPTHER
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
## 2022-10-25 RX ORDER — ATORVASTATIN CALCIUM 40 MG/1
TABLET, FILM COATED ORAL
Qty: 90 TABLET | Refills: 0 | Status: SHIPPED | OUTPATIENT
Start: 2022-10-25 | End: 2023-03-16

## 2022-10-25 RX ORDER — ATENOLOL 50 MG/1
TABLET ORAL
Qty: 90 TABLET | Refills: 0 | Status: SHIPPED | OUTPATIENT
Start: 2022-10-25 | End: 2023-03-16

## 2022-10-25 RX ORDER — GLIPIZIDE 10 MG/1
TABLET ORAL
Qty: 180 TABLET | Refills: 0 | Status: SHIPPED | OUTPATIENT
Start: 2022-10-25 | End: 2023-01-20

## 2022-10-25 RX ORDER — BACLOFEN 10 MG/1
TABLET ORAL
Qty: 90 TABLET | Refills: 0 | Status: SHIPPED | OUTPATIENT
Start: 2022-10-25 | End: 2022-11-23

## 2022-10-25 RX ORDER — AMLODIPINE BESYLATE 5 MG/1
TABLET ORAL
Qty: 180 TABLET | Refills: 0 | Status: SHIPPED | OUTPATIENT
Start: 2022-10-25 | End: 2023-03-16

## 2022-11-23 RX ORDER — BACLOFEN 10 MG/1
TABLET ORAL
Qty: 90 TABLET | Refills: 0 | Status: SHIPPED | OUTPATIENT
Start: 2022-11-23 | End: 2022-12-29

## 2022-12-01 ENCOUNTER — OFFICE VISIT (OUTPATIENT)
Dept: CARDIOLOGY | Facility: CLINIC | Age: 72
End: 2022-12-01

## 2022-12-01 VITALS
HEART RATE: 56 BPM | OXYGEN SATURATION: 98 % | WEIGHT: 190 LBS | BODY MASS INDEX: 28.79 KG/M2 | DIASTOLIC BLOOD PRESSURE: 87 MMHG | HEIGHT: 68 IN | SYSTOLIC BLOOD PRESSURE: 164 MMHG

## 2022-12-01 DIAGNOSIS — E11.65 TYPE 2 DIABETES MELLITUS WITH HYPERGLYCEMIA, WITHOUT LONG-TERM CURRENT USE OF INSULIN: ICD-10-CM

## 2022-12-01 DIAGNOSIS — I25.10 CHRONIC CORONARY ARTERY DISEASE: ICD-10-CM

## 2022-12-01 DIAGNOSIS — I10 PRIMARY HYPERTENSION: ICD-10-CM

## 2022-12-01 DIAGNOSIS — I65.23 BILATERAL CAROTID ARTERY STENOSIS: Primary | ICD-10-CM

## 2022-12-01 DIAGNOSIS — E78.2 MIXED HYPERLIPIDEMIA: ICD-10-CM

## 2022-12-01 DIAGNOSIS — N18.31 STAGE 3A CHRONIC KIDNEY DISEASE: ICD-10-CM

## 2022-12-01 PROCEDURE — 99214 OFFICE O/P EST MOD 30 MIN: CPT | Performed by: INTERNAL MEDICINE

## 2022-12-01 PROCEDURE — 93000 ELECTROCARDIOGRAM COMPLETE: CPT | Performed by: INTERNAL MEDICINE

## 2022-12-01 NOTE — PROGRESS NOTES
Cardiology Office Visit      Encounter Date:  12/01/2022    Patient ID:   Merlin Trujillo is a 72 y.o. male.    Reason For Followup:  Peripheral artery disease  Carotid stenosis  Hypertension  Hyperlipidemia    Brief Clinical History:  Dear Dr. Lyell, Reggie Duane, MD    I had the pleasure of seeing Merlin Trujillo today. As you are well aware, this is a 72 y.o. male with past medical history that is significant for history of hypertension hyperlipidemia  and peripheral arterial disease here for follow-up    Patient had symptoms of TIA with amaurosis fugax in the right eye.  Noted to have significant carotid stenosis in the right carotid artery  Status post a successful right carotid endarterectomy  Patient had recent hospitalization with urosepsis  Echocardiogram showed normal LV systolic function        Interval History:  Denies any chest pain  Blood pressure somewhat elevated in the office but patient states his home blood pressure readings are optimal  New cardiac symptoms  Denies any dizziness or syncope    Interpretation Summary    · Myocardial perfusion imaging indicates a normal myocardial perfusion study with no evidence of ischemia.  · Left ventricular ejection fraction is hyperdynamic (Calculated EF > 70%). .  · Findings consistent with a normal ECG stress test.  · Impressions are consistent with a low risk study.  · There is no prior study available for comparison.    1. The patient has a history of prior right carotid surgery a few years  ago. The degree of narrowing is less than 50% by consensus panel  criteria in the right common carotid artery carotid bifurcation.  2. Mild to moderate plaque deposition left carotid bifurcation. The  degree of narrowing is less than 50% by consensus panel criteria.  3. Patent vertebral arteries bilaterally with antegrade flow.       Assessment & Plan    Impressions:     Hypertension   hyperlipidemia   peripheral arterial disease   presumed coronary artery disease  "but no evidence of any inducible ischemia on the stress test  Bilateral carotid stenosis less than 50% on carotid ultrasound that was done in March 2021  Stress test in May 2021 with normal LV systolic function normal wall motion estimated LV ejection fraction of 70% with no inducible ischemia  Chronic renal insufficiency with improvement in the creatinine numbers compared to baseline    Recommendations:  Recent myocardial perfusion study that was normal   continued aggressive risk factor modification   regular exercise   labs with primary care physician office  Labs discussed with the patient  Recent labs reviewed and discussed with the patient  Medications reviewed and discussed with patient  Labs and medications reviewed and discussed the patient  Labs from May 2022 reviewed and discussed with the patient  Lipids from March reviewed and discussed with patient lipids are optimal including LDL cholesterol less than 70  Continue current medical therapy with aspirin 81 mg p.o. once a day atenolol 25 mg p.o. twice daily Lipitor 40 mg p.o. once a day  Advised patient to consider discussing with primary care physician about going back on low-dose metformin with normalization of the renal function  Close monitoring of blood pressure at home   follow up in office in 6 months  Objective:    Vitals:  Vitals:    12/01/22 1518   BP: 164/87   Pulse: 56   SpO2: 98%   Weight: 86.2 kg (190 lb)   Height: 172.7 cm (68\")       Physical Exam:    General: Alert, cooperative, no distress, appears stated age  Head:  Normocephalic, atraumatic, mucous membranes moist  Eyes:  Conjunctiva/corneas clear, EOM's intact     Neck:  Supple,  no adenopathy;      Lungs: Clear to auscultation bilaterally, no wheezes rhonchi rales are noted  Chest wall: No tenderness  Heart::  Regular rate and rhythm, S1 and S2 normal, no murmur, rub or gallop  Abdomen: Soft, non-tender, nondistended bowel sounds active  Extremities: No cyanosis, clubbing, or " "edema  Pulses: 2+ and symmetric all extremities  Skin:  No rashes or lesions  Neuro/psych: A&O x3. CN II through XII are grossly intact with appropriate affect      Allergies:  No Known Allergies    Medication Review:     Current Outpatient Medications:   •  amLODIPine (NORVASC) 5 MG tablet, TAKE ONE TABLET BY MOUTH TWICE DAILY, Disp: 180 tablet, Rfl: 0  •  aspirin 81 MG EC tablet, Take 81 mg by mouth Daily., Disp: , Rfl:   •  atenolol (TENORMIN) 50 MG tablet, TAKE 1/2 TABLET BY MOUTH TWICE DAILY, Disp: 90 tablet, Rfl: 0  •  atorvastatin (LIPITOR) 40 MG tablet, TAKE ONE TABLET BY MOUTH EVERY NIGHT AT BEDTIME, Disp: 90 tablet, Rfl: 0  •  baclofen (LIORESAL) 10 MG tablet, TAKE ONE TABLET BY MOUTH THREE TIMES DAILY, Disp: 90 tablet, Rfl: 0  •  cholecalciferol (VITAMIN D3) 10 MCG (400 UNIT) tablet, Take 400 Units by mouth Daily. 1000 units, Disp: , Rfl:   •  cyanocobalamin 1000 MCG/ML injection, , Disp: , Rfl:   •  Gabapentin Enacarbil  MG tablet controlled-release, Take 600 mg by mouth 3 (Three) Times a Day As Needed (pain)., Disp: 90 tablet, Rfl: 11  •  glipizide (GLUCOTROL) 10 MG tablet, TAKE ONE TABLET BY MOUTH TWICE DAILY BEFORE MEALS, Disp: 180 tablet, Rfl: 0  •  lubiprostone (Amitiza) 24 MCG capsule, Take 1 capsule by mouth 2 (Two) Times a Day., Disp: 180 capsule, Rfl: 3  •  metFORMIN (GLUCOPHAGE) 500 MG tablet, TAKE 1 TABLET BY MOUTH 2 TIMES A DAY WITH MEALS, Disp: 180 tablet, Rfl: 0  •  Morphine (MS CONTIN) 15 MG 12 hr tablet, Take 1 tablet by mouth 3 (Three) Times a Day., Disp: 90 tablet, Rfl: 0  •  multivitamin (THERAGRAN) tablet tablet, Take  by mouth Daily. Calcium+magnesium+zinc \"Sometimes\", Disp: , Rfl:   •  pentoxifylline (TRENtal) 400 MG CR tablet, Take 1 tablet by mouth 3 (Three) Times a Day., Disp: 270 tablet, Rfl: 3  •  Accu-Chek FastClix Lancets misc, Test blood sugars once day, Disp: 102 each, Rfl: 0  •  glucose blood (Accu-Chek Guide) test strip, Test blood sugars once a day., Disp: 100 " "each, Rfl: 3  •  Morphine (MS CONTIN) 15 MG 12 hr tablet, Take 1 tablet by mouth 3 (Three) Times a Day As Needed for Severe Pain. DNF before 11/27/2022, Disp: 90 tablet, Rfl: 0  •  NON FORMULARY, Comfort guardian 24-- for pain - ordered meds-- helps with arm pain, Disp: , Rfl:   •  Syringe/Needle, Disp, (B-D 3CC LUER-RONY SYR 25GX1\") 25G X 1\" 3 ML misc, B12 injection once week, Disp: 12 each, Rfl: 3    Family History:  Family History   Problem Relation Age of Onset   • Dementia Mother    • Heart disease Mother    • COPD Father    • Stroke Father    • Heart disease Father    • Hypertension Sister    • Diabetes Sister    • Hypertension Brother        Past Medical History:  Past Medical History:   Diagnosis Date   • Adenomatous polyps    • Amaurosis fugax 3/27/2017   • Arm pain     left into hand   • Arm pain    • Arm pain     left--- wk comp --injury 6/15/2015   • Blood in urine     3/2020   • BPH (benign prostatic hyperplasia)    • CAD (coronary artery disease)    • CKD (chronic kidney disease) stage 3, GFR 30-59 ml/min ([facility])    • CVA, old, speech/language deficit    • Diabetes ([facility])    • Diabetic acetonemia ([facility])    • Hearing loss    • Hematuria    • Hernia, inguinal    • Hyperlipidemia    • Hypertension    • Kidney stones    • Low back pain    • Neuropathic pain    • Obstructive uropathy    • Shoulder pain    • Shoulder pain    • Sleep disorder    • Spondylosis, cervical    • Urgency of urination        Past surgical History:  Past Surgical History:   Procedure Laterality Date   • ANKLE SURGERY      replacement  rt   • CAROTID ARTERY ANGIOPLASTY     • CARPAL TUNNEL RELEASE     • COLONOSCOPY     • CYSTOSCOPY URETEROSCOPY LASER LITHOTRIPSY      kidney stones   • EYE SURGERY      mesh  and plate under rt eye due to orbital fx   • NOSE SURGERY      x2   • OTHER SURGICAL HISTORY      uro  lift  1 /2021   • TEETH EXTRACTION      dentures       Social History:  Social History     Socioeconomic History   • Marital status: " Significant Other   Tobacco Use   • Smoking status: Never   • Smokeless tobacco: Never   Vaping Use   • Vaping Use: Never used   Substance and Sexual Activity   • Alcohol use: No   • Drug use: Never   • Sexual activity: Defer       Review of Systems:  The following systems were reviewed as they relate to the cardiovascular system: Constitutional, Eyes, ENT, Cardiovascular, Respiratory, Gastrointestinal, Integumentary, Neurological, Psychiatric, Hematologic, Endocrine, Musculoskeletal, and Genitourinary. The pertinent cardiovascular findings are reported above with all other cardiovascular points within those systems being negative.    Diagnostic Study Review:     Current Electrocardiogram:    ECG 12 Lead    Date/Time: 12/1/2022 3:32 PM  Performed by: Yury Iraheta MD  Authorized by: Yury Iraheta MD   Comparison: compared with previous ECG   Similar to previous ECG  Rhythm: sinus rhythm  Rate: normal  BPM: 56  Conduction: conduction normal  QRS axis: normal  Other findings: non-specific ST-T wave changes    Clinical impression: abnormal EKG              NOTE: The following portions of the patient's history were reviewed and updated this visit as appropriate: allergies, current medications, past family history, past medical history, past social history, past surgical history and problem list.

## 2022-12-22 ENCOUNTER — OFFICE VISIT (OUTPATIENT)
Dept: PAIN MEDICINE | Facility: CLINIC | Age: 72
End: 2022-12-22

## 2022-12-22 VITALS
HEART RATE: 58 BPM | DIASTOLIC BLOOD PRESSURE: 92 MMHG | SYSTOLIC BLOOD PRESSURE: 142 MMHG | OXYGEN SATURATION: 96 % | RESPIRATION RATE: 16 BRPM

## 2022-12-22 DIAGNOSIS — M25.512 CHRONIC LEFT SHOULDER PAIN: ICD-10-CM

## 2022-12-22 DIAGNOSIS — Z79.899 HIGH RISK MEDICATION USE: Primary | ICD-10-CM

## 2022-12-22 DIAGNOSIS — G89.4 CHRONIC PAIN SYNDROME: Primary | ICD-10-CM

## 2022-12-22 DIAGNOSIS — M47.817 LUMBOSACRAL SPONDYLOSIS WITHOUT MYELOPATHY: ICD-10-CM

## 2022-12-22 DIAGNOSIS — M47.812 CERVICAL SPONDYLOSIS WITHOUT MYELOPATHY: ICD-10-CM

## 2022-12-22 DIAGNOSIS — Z79.899 HIGH RISK MEDICATION USE: ICD-10-CM

## 2022-12-22 DIAGNOSIS — M79.2 NEUROPATHIC PAIN: ICD-10-CM

## 2022-12-22 DIAGNOSIS — G89.29 CHRONIC LEFT SHOULDER PAIN: ICD-10-CM

## 2022-12-22 PROCEDURE — 99214 OFFICE O/P EST MOD 30 MIN: CPT | Performed by: ANESTHESIOLOGY

## 2022-12-22 RX ORDER — MORPHINE SULFATE 15 MG/1
15 TABLET, FILM COATED, EXTENDED RELEASE ORAL 3 TIMES DAILY
Qty: 90 TABLET | Refills: 0 | Status: SHIPPED | OUTPATIENT
Start: 2022-12-26 | End: 2023-02-21 | Stop reason: SDUPTHER

## 2022-12-22 RX ORDER — MORPHINE SULFATE 15 MG/1
15 TABLET, FILM COATED, EXTENDED RELEASE ORAL 3 TIMES DAILY PRN
Qty: 90 TABLET | Refills: 0 | Status: SHIPPED | OUTPATIENT
Start: 2023-01-26 | End: 2023-02-21 | Stop reason: SDUPTHER
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
## 2022-12-28 PROCEDURE — 87070 CULTURE OTHR SPECIMN AEROBIC: CPT | Performed by: FAMILY MEDICINE

## 2022-12-28 PROCEDURE — 87186 SC STD MICRODIL/AGAR DIL: CPT | Performed by: FAMILY MEDICINE

## 2022-12-28 PROCEDURE — 87077 CULTURE AEROBIC IDENTIFY: CPT | Performed by: FAMILY MEDICINE

## 2022-12-28 PROCEDURE — 87205 SMEAR GRAM STAIN: CPT | Performed by: FAMILY MEDICINE

## 2022-12-29 RX ORDER — BACLOFEN 10 MG/1
TABLET ORAL
Qty: 90 TABLET | Refills: 0 | Status: SHIPPED | OUTPATIENT
Start: 2022-12-29 | End: 2023-01-20

## 2023-01-05 ENCOUNTER — LAB (OUTPATIENT)
Dept: LAB | Facility: HOSPITAL | Age: 73
End: 2023-01-05
Payer: MEDICARE

## 2023-01-05 ENCOUNTER — OFFICE VISIT (OUTPATIENT)
Dept: FAMILY MEDICINE CLINIC | Facility: CLINIC | Age: 73
End: 2023-01-05
Payer: MEDICARE

## 2023-01-05 VITALS
DIASTOLIC BLOOD PRESSURE: 60 MMHG | HEIGHT: 68 IN | OXYGEN SATURATION: 98 % | TEMPERATURE: 98.3 F | SYSTOLIC BLOOD PRESSURE: 142 MMHG | BODY MASS INDEX: 28.64 KG/M2 | WEIGHT: 189 LBS | RESPIRATION RATE: 16 BRPM | HEART RATE: 64 BPM

## 2023-01-05 DIAGNOSIS — E78.2 MIXED HYPERLIPIDEMIA: Chronic | ICD-10-CM

## 2023-01-05 DIAGNOSIS — M79.2 NEUROPATHIC PAIN: Chronic | ICD-10-CM

## 2023-01-05 DIAGNOSIS — E53.8 B12 DEFICIENCY: Chronic | ICD-10-CM

## 2023-01-05 DIAGNOSIS — I25.10 CHRONIC CORONARY ARTERY DISEASE: Chronic | ICD-10-CM

## 2023-01-05 DIAGNOSIS — E55.9 VITAMIN D DEFICIENCY: Chronic | ICD-10-CM

## 2023-01-05 DIAGNOSIS — E11.65 TYPE 2 DIABETES MELLITUS WITH HYPERGLYCEMIA, WITHOUT LONG-TERM CURRENT USE OF INSULIN: Chronic | ICD-10-CM

## 2023-01-05 DIAGNOSIS — I10 PRIMARY HYPERTENSION: Chronic | ICD-10-CM

## 2023-01-05 DIAGNOSIS — G89.4 CHRONIC PAIN SYNDROME: Chronic | ICD-10-CM

## 2023-01-05 DIAGNOSIS — L02.212 ABSCESS OF BACK: Chronic | ICD-10-CM

## 2023-01-05 DIAGNOSIS — N40.0 BENIGN PROSTATIC HYPERPLASIA WITHOUT LOWER URINARY TRACT SYMPTOMS: Chronic | ICD-10-CM

## 2023-01-05 DIAGNOSIS — N18.31 STAGE 3A CHRONIC KIDNEY DISEASE: Chronic | ICD-10-CM

## 2023-01-05 LAB
25(OH)D3 SERPL-MCNC: 54.2 NG/ML (ref 30–100)
ALBUMIN UR-MCNC: 2.8 MG/DL
CHOLEST SERPL-MCNC: 145 MG/DL (ref 0–200)
CREAT UR-MCNC: 63.8 MG/DL
HBA1C MFR BLD: 6.7 % (ref 3.5–5.6)
HDLC SERPL-MCNC: 53 MG/DL (ref 40–60)
LDLC SERPL CALC-MCNC: 72 MG/DL (ref 0–100)
LDLC/HDLC SERPL: 1.33 {RATIO}
MICROALBUMIN/CREAT UR: 43.9 MG/G
TRIGL SERPL-MCNC: 108 MG/DL (ref 0–150)
VIT B12 BLD-MCNC: 1271 PG/ML (ref 211–946)
VLDLC SERPL-MCNC: 20 MG/DL (ref 5–40)

## 2023-01-05 PROCEDURE — 82043 UR ALBUMIN QUANTITATIVE: CPT

## 2023-01-05 PROCEDURE — 82570 ASSAY OF URINE CREATININE: CPT

## 2023-01-05 PROCEDURE — 82306 VITAMIN D 25 HYDROXY: CPT

## 2023-01-05 PROCEDURE — 99214 OFFICE O/P EST MOD 30 MIN: CPT | Performed by: NURSE PRACTITIONER

## 2023-01-05 PROCEDURE — 36415 COLL VENOUS BLD VENIPUNCTURE: CPT | Performed by: NURSE PRACTITIONER

## 2023-01-05 PROCEDURE — 82607 VITAMIN B-12: CPT | Performed by: NURSE PRACTITIONER

## 2023-01-05 PROCEDURE — 83036 HEMOGLOBIN GLYCOSYLATED A1C: CPT

## 2023-01-05 PROCEDURE — 80061 LIPID PANEL: CPT

## 2023-01-05 NOTE — PATIENT INSTRUCTIONS
Get us information about your last diabetic eye exam.  Need flu shot in next month or so.   Monitor blood pressure follow up on labs.   Start letting warm soapy water clean your back.   Watch for stool in bed and on clothing.   If symptoms worsen get seen.   Monitor back for worsening symptoms.

## 2023-01-05 NOTE — PROGRESS NOTES
Subjective        Merlin Trujillo is a 72 y.o. male.     Chief Complaint   Patient presents with   • Diabetes     6 mth f/u   • Hypertension   • Cyst     Urgent care f/u       History of Present Illness  Patient is here for management of his chronic medical problems: diabetes, hypertension, vit d def, B12 def, CAD,   CD stage 3 gfr 30-59, chronic pain. New problem  sebacous cyst on back.     Sebaceous cyst back recently seen in urgent care: 12/28/2022  Had bump on back several months but had pain 3-4 days prior became red and swollen  Comments: area of redness induration and tenderness left mid back. apperas to be a pore that extends from that area.   I& D performed. Culture obtained and he was started on septra.   Found to have ecoli on the wound. Susceptible to bactrim.     Diabetes reports higher blood sugar over the holiday. A1C 6.9 7/2022 high but down. Glipizide 10 mg bid, metformin 500 bid with meals.     Hypertension: amlodipine 5 mg daily atenolol 50 mg take 1/2 bid    Chronic pain: followed by pain management taking gabapentin enacarbil  mg tid for neuropathic pain.  MS contin 15 mg tid .   Baclofen 10 mg tid  Pain is controlled pretty well most of the time. He will have days more painful never less than numbness tingling left arm.     Constipation related to morphine use taking amitiza 24 mg bid.     CAD followed by cardiology seen 12/2022 had symptoms of TIA wit amaurosis fugax right eye. Was ntoed to have carodtid stenosis right carotid arter, sp successful right carotid endarterectomy  Cho showed normal LV ststolic function.   EF 70%      BPH followed by urology.       The following portions of the patient's history were reviewed and updated as appropriate: allergies, current medications, past family history, past medical history, past social history, past surgical history and problem list.      Current Outpatient Medications:   •  Accu-Chek FastClix Lancets misc, Test blood sugars once day,  Disp: 102 each, Rfl: 0  •  amLODIPine (NORVASC) 5 MG tablet, TAKE ONE TABLET BY MOUTH TWICE DAILY, Disp: 180 tablet, Rfl: 0  •  aspirin 81 MG EC tablet, Take 81 mg by mouth Daily., Disp: , Rfl:   •  atenolol (TENORMIN) 50 MG tablet, TAKE 1/2 TABLET BY MOUTH TWICE DAILY, Disp: 90 tablet, Rfl: 0  •  atorvastatin (LIPITOR) 40 MG tablet, TAKE ONE TABLET BY MOUTH EVERY NIGHT AT BEDTIME, Disp: 90 tablet, Rfl: 0  •  baclofen (LIORESAL) 10 MG tablet, TAKE ONE TABLET BY MOUTH THREE TIMES DAILY, Disp: 90 tablet, Rfl: 0  •  cholecalciferol (VITAMIN D3) 10 MCG (400 UNIT) tablet, Take 400 Units by mouth Daily. 1000 units, Disp: , Rfl:   •  cyanocobalamin 1000 MCG/ML injection, , Disp: , Rfl:   •  Gabapentin Enacarbil  MG tablet controlled-release, Take 600 mg by mouth 3 (Three) Times a Day As Needed (pain)., Disp: 90 tablet, Rfl: 11  •  glipizide (GLUCOTROL) 10 MG tablet, TAKE ONE TABLET BY MOUTH TWICE DAILY BEFORE MEALS, Disp: 180 tablet, Rfl: 0  •  glucose blood (Accu-Chek Guide) test strip, Test blood sugars once a day., Disp: 100 each, Rfl: 3  •  lubiprostone (Amitiza) 24 MCG capsule, Take 1 capsule by mouth 2 (Two) Times a Day., Disp: 180 capsule, Rfl: 3  •  metFORMIN (GLUCOPHAGE) 500 MG tablet, TAKE 1 TABLET BY MOUTH 2 TIMES A DAY WITH MEALS, Disp: 180 tablet, Rfl: 0  •  Morphine (MS CONTIN) 15 MG 12 hr tablet, Take 1 tablet by mouth 3 (Three) Times a Day., Disp: 90 tablet, Rfl: 0  •  [START ON 1/26/2023] Morphine (MS CONTIN) 15 MG 12 hr tablet, Take 1 tablet by mouth 3 (Three) Times a Day As Needed for Severe Pain. DNF before 1/26/2023, Disp: 90 tablet, Rfl: 0  •  multivitamin (THERAGRAN) tablet tablet, Take  by mouth Daily. Calcium+magnesium+zinc \"Sometimes\", Disp: , Rfl:   •  NON FORMULARY, Comfort guardian 24-- for pain - ordered meds-- helps with arm pain, Disp: , Rfl:   •  pentoxifylline (TRENtal) 400 MG CR tablet, Take 1 tablet by mouth 3 (Three) Times a Day., Disp: 270 tablet, Rfl: 3  •  Syringe/Needle, Disp,  (B-D 3CC LUER-RONY SYR 25GX1\") 25G X 1\" 3 ML misc, B12 injection once week, Disp: 12 each, Rfl: 3    Recent Results (from the past 4032 hour(s))   Wound Culture - Wound, Back    Collection Time: 12/28/22  8:23 PM    Specimen: Back; Wound   Result Value Ref Range    Wound Culture Scant growth (1+) Escherichia coli (A)     Gram Stain Few (2+) WBCs per low power field     Gram Stain No organisms seen     Gram Stain No Epithelial cells per low power field        Susceptibility    Escherichia coli - VANESSA*     Ampicillin  Susceptible ug/ml     Ampicillin + Sulbactam  Susceptible ug/ml     Cefepime  Susceptible ug/ml     Ceftazidime  Susceptible ug/ml     Ceftriaxone  Susceptible ug/ml     Gentamicin  Susceptible ug/ml     Levofloxacin  Susceptible ug/ml     Piperacillin + Tazobactam  Susceptible ug/ml     Tetracycline  Susceptible ug/ml     Trimethoprim + Sulfamethoxazole  Susceptible ug/ml     * Cefazolin sensitivity will not be reported for Enterobacteriaceae in non-urine isolates. If cefazolin is preferred, please call the microbiology lab to request an E-test.  With the exception of urinary-sourced infections, aminoglycosides should not be used as monotherapy.         Review of Systems    Objective     /60   Pulse 64   Temp 98.3 °F (36.8 °C) (Oral)   Resp 16   Ht 172.7 cm (68\")   Wt 85.7 kg (189 lb)   SpO2 98%   BMI 28.74 kg/m²     Physical Exam  Cardiovascular:      Pulses:           Dorsalis pedis pulses are 2+ on the right side and 2+ on the left side.        Posterior tibial pulses are 2+ on the right side and 2+ on the left side.   Musculoskeletal:      Right foot: Normal range of motion.      Left foot: Normal range of motion.   Feet:      Right foot:      Protective Sensation: 10 sites tested. 10 sites sensed.      Skin integrity: Skin integrity normal.      Left foot:      Protective Sensation: 10 sites tested. 10 sites sensed.      Skin integrity: Skin integrity normal.      Toenail Condition:  Fungal disease present.  Skin:            Comments: Healing abscessed wound left mid back.  No drng non fluctant  Skin is dark circumferentially but no warmth or sign of infections.          Result Review :                Assessment & Plan    Diagnoses and all orders for this visit:    1. Chronic coronary artery disease  Comments:  stable    2. Mixed hyperlipidemia  Comments:  labs ordered  Orders:  -     Lipid Panel; Future  -     Comprehensive Metabolic Panel; Future    3. Primary hypertension  Comments:  stable  Orders:  -     Lipid Panel; Future  -     Comprehensive Metabolic Panel; Future    4. B12 deficiency  Comments:  labs ordered  Orders:  -     Vitamin B12    5. Type 2 diabetes mellitus with hyperglycemia, without long-term current use of insulin (HCC)  Comments:  stable labs ordered  Orders:  -     Lipid Panel; Future  -     Microalbumin / Creatinine Urine Ratio - Urine, Clean Catch; Future  -     Comprehensive Metabolic Panel; Future  -     Hemoglobin A1c; Future    6. Benign prostatic hyperplasia without lower urinary tract symptoms  Comments:  stable followed by urology    7. Stage 3a chronic kidney disease (HCC)  Comments:  followed by nephrology    8. Chronic pain syndrome  Comments:  stable followed by pain managment    9. Neuropathic pain  Comments:  stable    10. Abscess of back  Comments:  discussed wound care and worsening symptoms    11. Vitamin D deficiency  Comments:  labs ordered  Orders:  -     Vitamin D,25-Hydroxy; Future      Patient Instructions   Get us information about your last diabetic eye exam.  Need flu shot in next month or so.   Monitor blood pressure follow up on labs.   Start letting warm soapy water clean your back.   Watch for stool in bed and on clothing.   If symptoms worsen get seen.   Monitor back for worsening symptoms.             Follow Up   Return in about 6 months (around 7/5/2023).    Patient was given instructions and counseling regarding his condition or for  health maintenance advice. Please see specific information pulled into the AVS if appropriate.     Court Anderson, APRN    01/05/23

## 2023-01-20 RX ORDER — BACLOFEN 10 MG/1
TABLET ORAL
Qty: 90 TABLET | Refills: 0 | Status: SHIPPED | OUTPATIENT
Start: 2023-01-20 | End: 2023-03-16

## 2023-01-20 RX ORDER — GLIPIZIDE 10 MG/1
TABLET ORAL
Qty: 180 TABLET | Refills: 0 | Status: SHIPPED | OUTPATIENT
Start: 2023-01-20

## 2023-02-16 RX ORDER — CYANOCOBALAMIN 1000 UG/ML
INJECTION, SOLUTION INTRAMUSCULAR; SUBCUTANEOUS
Qty: 1 ML | Refills: 5 | Status: SHIPPED | OUTPATIENT
Start: 2023-02-16

## 2023-02-21 ENCOUNTER — OFFICE VISIT (OUTPATIENT)
Dept: PAIN MEDICINE | Facility: CLINIC | Age: 73
End: 2023-02-21
Payer: OTHER MISCELLANEOUS

## 2023-02-21 VITALS
SYSTOLIC BLOOD PRESSURE: 145 MMHG | DIASTOLIC BLOOD PRESSURE: 73 MMHG | OXYGEN SATURATION: 97 % | HEART RATE: 61 BPM | RESPIRATION RATE: 16 BRPM

## 2023-02-21 DIAGNOSIS — G89.4 CHRONIC PAIN SYNDROME: Primary | ICD-10-CM

## 2023-02-21 DIAGNOSIS — M47.812 CERVICAL SPONDYLOSIS WITHOUT MYELOPATHY: ICD-10-CM

## 2023-02-21 DIAGNOSIS — M47.817 LUMBOSACRAL SPONDYLOSIS WITHOUT MYELOPATHY: ICD-10-CM

## 2023-02-21 DIAGNOSIS — M79.2 NEUROPATHIC PAIN: ICD-10-CM

## 2023-02-21 DIAGNOSIS — M25.512 CHRONIC LEFT SHOULDER PAIN: ICD-10-CM

## 2023-02-21 DIAGNOSIS — G89.29 CHRONIC LEFT SHOULDER PAIN: ICD-10-CM

## 2023-02-21 DIAGNOSIS — Z79.899 HIGH RISK MEDICATION USE: ICD-10-CM

## 2023-02-21 PROCEDURE — 99214 OFFICE O/P EST MOD 30 MIN: CPT | Performed by: ANESTHESIOLOGY

## 2023-02-21 RX ORDER — MORPHINE SULFATE 15 MG/1
15 TABLET, FILM COATED, EXTENDED RELEASE ORAL 3 TIMES DAILY PRN
Qty: 90 TABLET | Refills: 0 | Status: SHIPPED | OUTPATIENT
Start: 2023-03-25

## 2023-02-21 RX ORDER — MORPHINE SULFATE 15 MG/1
15 TABLET, FILM COATED, EXTENDED RELEASE ORAL 3 TIMES DAILY
Qty: 90 TABLET | Refills: 0 | Status: SHIPPED | OUTPATIENT
Start: 2023-02-25
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
## 2023-03-16 RX ORDER — ATENOLOL 50 MG/1
TABLET ORAL
Qty: 90 TABLET | Refills: 0 | Status: SHIPPED | OUTPATIENT
Start: 2023-03-16

## 2023-03-16 RX ORDER — AMLODIPINE BESYLATE 5 MG/1
TABLET ORAL
Qty: 180 TABLET | Refills: 0 | Status: SHIPPED | OUTPATIENT
Start: 2023-03-16

## 2023-03-16 RX ORDER — ATORVASTATIN CALCIUM 40 MG/1
TABLET, FILM COATED ORAL
Qty: 90 TABLET | Refills: 0 | Status: SHIPPED | OUTPATIENT
Start: 2023-03-16

## 2023-03-16 RX ORDER — BACLOFEN 10 MG/1
TABLET ORAL
Qty: 90 TABLET | Refills: 0 | Status: SHIPPED | OUTPATIENT
Start: 2023-03-16

## 2023-04-18 ENCOUNTER — OFFICE VISIT (OUTPATIENT)
Dept: PAIN MEDICINE | Facility: CLINIC | Age: 73
End: 2023-04-18
Payer: MEDICARE

## 2023-04-18 VITALS
HEART RATE: 61 BPM | SYSTOLIC BLOOD PRESSURE: 122 MMHG | RESPIRATION RATE: 16 BRPM | OXYGEN SATURATION: 95 % | DIASTOLIC BLOOD PRESSURE: 63 MMHG

## 2023-04-18 DIAGNOSIS — M79.2 NEUROPATHIC PAIN: ICD-10-CM

## 2023-04-18 DIAGNOSIS — M25.512 CHRONIC LEFT SHOULDER PAIN: ICD-10-CM

## 2023-04-18 DIAGNOSIS — G89.4 CHRONIC PAIN SYNDROME: ICD-10-CM

## 2023-04-18 DIAGNOSIS — G89.29 CHRONIC LEFT SHOULDER PAIN: ICD-10-CM

## 2023-04-18 DIAGNOSIS — Z79.899 HIGH RISK MEDICATION USE: Primary | ICD-10-CM

## 2023-04-18 DIAGNOSIS — M47.812 CERVICAL SPONDYLOSIS WITHOUT MYELOPATHY: ICD-10-CM

## 2023-04-18 DIAGNOSIS — M47.817 LUMBOSACRAL SPONDYLOSIS WITHOUT MYELOPATHY: ICD-10-CM

## 2023-04-18 RX ORDER — ATORVASTATIN CALCIUM 40 MG/1
TABLET, FILM COATED ORAL
Qty: 90 TABLET | Refills: 0 | Status: SHIPPED | OUTPATIENT
Start: 2023-04-18

## 2023-04-18 RX ORDER — BACLOFEN 10 MG/1
TABLET ORAL
Qty: 90 TABLET | Refills: 0 | Status: SHIPPED | OUTPATIENT
Start: 2023-04-18

## 2023-04-18 RX ORDER — GLIPIZIDE 10 MG/1
TABLET ORAL
Qty: 180 TABLET | Refills: 0 | Status: SHIPPED | OUTPATIENT
Start: 2023-04-18

## 2023-04-18 RX ORDER — MORPHINE SULFATE 15 MG/1
15 TABLET, FILM COATED, EXTENDED RELEASE ORAL 3 TIMES DAILY
Qty: 90 TABLET | Refills: 0 | Status: SHIPPED | OUTPATIENT
Start: 2023-04-24

## 2023-04-18 RX ORDER — MORPHINE SULFATE 15 MG/1
15 TABLET, FILM COATED, EXTENDED RELEASE ORAL 3 TIMES DAILY PRN
Qty: 90 TABLET | Refills: 0 | Status: SHIPPED | OUTPATIENT
Start: 2023-05-24
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
## 2023-05-18 RX ORDER — BACLOFEN 10 MG/1
TABLET ORAL
Qty: 90 TABLET | Refills: 0 | OUTPATIENT
Start: 2023-05-18

## 2023-05-24 ENCOUNTER — TELEPHONE (OUTPATIENT)
Dept: FAMILY MEDICINE CLINIC | Facility: CLINIC | Age: 73
End: 2023-05-24
Payer: MEDICARE

## 2023-05-25 ENCOUNTER — TELEPHONE (OUTPATIENT)
Dept: FAMILY MEDICINE CLINIC | Facility: CLINIC | Age: 73
End: 2023-05-25
Payer: MEDICARE

## 2023-05-25 RX ORDER — BACLOFEN 10 MG/1
10 TABLET ORAL 3 TIMES DAILY
Qty: 270 TABLET | Refills: 1 | Status: SHIPPED | OUTPATIENT
Start: 2023-05-25

## 2023-05-25 NOTE — TELEPHONE ENCOUNTER
Cassandra called back stating that you've been prescribing his Baclofen since Dr. Tucker retired. She states pain management has never prescribed the baclofen. Please advise.

## 2023-06-07 ENCOUNTER — OFFICE VISIT (OUTPATIENT)
Dept: CARDIOLOGY | Facility: CLINIC | Age: 73
End: 2023-06-07
Payer: MEDICARE

## 2023-06-07 VITALS
SYSTOLIC BLOOD PRESSURE: 150 MMHG | DIASTOLIC BLOOD PRESSURE: 76 MMHG | RESPIRATION RATE: 18 BRPM | HEART RATE: 55 BPM | HEIGHT: 68 IN | WEIGHT: 189 LBS | BODY MASS INDEX: 28.64 KG/M2 | OXYGEN SATURATION: 97 %

## 2023-06-07 DIAGNOSIS — E78.2 MIXED HYPERLIPIDEMIA: ICD-10-CM

## 2023-06-07 DIAGNOSIS — I25.10 CHRONIC CORONARY ARTERY DISEASE: Primary | ICD-10-CM

## 2023-06-07 DIAGNOSIS — E11.65 TYPE 2 DIABETES MELLITUS WITH HYPERGLYCEMIA, WITHOUT LONG-TERM CURRENT USE OF INSULIN: ICD-10-CM

## 2023-06-07 DIAGNOSIS — H34.219 PARTIAL RETINAL ARTERY OCCLUSION, UNSPECIFIED LATERALITY: ICD-10-CM

## 2023-06-07 DIAGNOSIS — N18.31 STAGE 3A CHRONIC KIDNEY DISEASE: ICD-10-CM

## 2023-06-07 DIAGNOSIS — I65.23 BILATERAL CAROTID ARTERY STENOSIS: ICD-10-CM

## 2023-06-07 DIAGNOSIS — I10 PRIMARY HYPERTENSION: ICD-10-CM

## 2023-06-07 NOTE — PROGRESS NOTES
Cardiology Office Visit      Encounter Date:  06/07/2023    Patient ID:   Merlin Trujillo is a 73 y.o. male.    Reason For Followup:  Peripheral artery disease  Carotid stenosis  Hypertension  Hyperlipidemia    Brief Clinical History:  Dear Dr. Lyell, Reggie Duane, MD    I had the pleasure of seeing Merlin Trujillo today. As you are well aware, this is a 73 y.o. male with past medical history that is significant for history of hypertension hyperlipidemia  and peripheral arterial disease here for follow-up    Patient had symptoms of TIA with amaurosis fugax in the right eye.  Noted to have significant carotid stenosis in the right carotid artery  Status post a successful right carotid endarterectomy  Patient had recent hospitalization with urosepsis  Echocardiogram showed normal LV systolic function        Interval History:  Denies any chest pain  Blood pressure somewhat elevated in the office but patient states his home blood pressure readings are optimal  New cardiac symptoms  Denies any dizziness or syncope    Interpretation Summary    Myocardial perfusion imaging indicates a normal myocardial perfusion study with no evidence of ischemia.  Left ventricular ejection fraction is hyperdynamic (Calculated EF > 70%). .  Findings consistent with a normal ECG stress test.  Impressions are consistent with a low risk study.  There is no prior study available for comparison.    1. The patient has a history of prior right carotid surgery a few years  ago. The degree of narrowing is less than 50% by consensus panel  criteria in the right common carotid artery carotid bifurcation.  2. Mild to moderate plaque deposition left carotid bifurcation. The  degree of narrowing is less than 50% by consensus panel criteria.  3. Patent vertebral arteries bilaterally with antegrade flow.       Assessment & Plan    Impressions:     Hypertension   hyperlipidemia   peripheral arterial disease   presumed coronary artery disease but no  "evidence of any inducible ischemia on the stress test  Bilateral carotid stenosis less than 50% on carotid ultrasound that was done in March 2021  Stress test in May 2021 with normal LV systolic function normal wall motion estimated LV ejection fraction of 70% with no inducible ischemia  Chronic renal insufficiency with improvement in the creatinine numbers compared to baseline  [place] hypertension    Recommendations:  Recent myocardial perfusion study that was normal   continued aggressive risk factor modification   regular exercise   labs with primary care physician office  Labs discussed with the patient  Recent labs reviewed and discussed with the patient  Medications reviewed and discussed with patient  Labs and medications reviewed and discussed the patient  Labs from May 2022 reviewed and discussed with the patient  Lipids from March reviewed and discussed with patient lipids are optimal including LDL cholesterol less than 70  Continue current medical therapy with aspirin 81 mg p.o. once a day atenolol 25 mg p.o. twice daily Lipitor 40 mg p.o. once a day  Advised patient to consider discussing with primary care physician about going back on low-dose metformin with normalization of the renal function  Close monitoring of blood pressure at home  Consider repeat carotid ultrasound study next year  Recommend vascular screening study  Home blood pressure monitoring  Recent labs reviewed and discussed with patient  Close monitoring of renal function with primary care physician office   follow up in office in 6 months    Objective:    Vitals:  Vitals:    06/07/23 1306   BP: 150/76   BP Location: Left arm   Patient Position: Sitting   Cuff Size: Large Adult   Pulse: 55   Resp: 18   SpO2: 97%   Weight: 85.7 kg (189 lb)   Height: 172.7 cm (68\")       Physical Exam:  General: Alert, cooperative, no distress, appears stated age  Head:  Normocephalic, atraumatic, mucous membranes moist  Eyes:  Conjunctiva/corneas " clear, EOM's intact     Neck:  Supple,  no adenopathy;      Lungs: Clear to auscultation bilaterally, no wheezes rhonchi rales are noted  Chest wall: No tenderness  Heart::  Regular rate and rhythm, S1 and S2 normal, no murmur, rub or gallop  Abdomen: Soft, non-tender, nondistended bowel sounds active  Extremities: No cyanosis, clubbing, or edema  Pulses: 2+ and symmetric all extremities  Skin:  No rashes or lesions  Neuro/psych: A&O x3. CN II through XII are grossly intact with appropriate affect      Allergies:  No Known Allergies    Medication Review:     Current Outpatient Medications:     Accu-Chek FastClix Lancets misc, Test blood sugars once day, Disp: 102 each, Rfl: 0    amLODIPine (NORVASC) 5 MG tablet, TAKE ONE TABLET BY MOUTH TWICE DAILY, Disp: 180 tablet, Rfl: 0    aspirin 81 MG EC tablet, Take 1 tablet by mouth Daily., Disp: , Rfl:     atenolol (TENORMIN) 50 MG tablet, TAKE 1/2 TABLET BY MOUTH TWICE DAILY, Disp: 90 tablet, Rfl: 0    atorvastatin (LIPITOR) 40 MG tablet, TAKE ONE TABLET BY MOUTH EVERY NIGHT AT BEDTIME, Disp: 90 tablet, Rfl: 0    baclofen (LIORESAL) 10 MG tablet, Take 1 tablet by mouth 3 (Three) Times a Day., Disp: 270 tablet, Rfl: 1    cholecalciferol (VITAMIN D3) 10 MCG (400 UNIT) tablet, Take 1 tablet by mouth Daily. 1000 units, Disp: , Rfl:     cyanocobalamin 1000 MCG/ML injection, INJECT 1ML INTO THE APPROPRIATE MUSCLE AS DIRECTED BY PRESCRIBER ONE TIME FOR ONE DOSE, Disp: 1 mL, Rfl: 5    Gabapentin Enacarbil  MG tablet controlled-release, Take 300 mg by mouth every night at bedtime., Disp: 90 tablet, Rfl: 3    Gabapentin Enacarbil  MG tablet controlled-release, Take 600 mg by mouth 3 (Three) Times a Day As Needed (pain)., Disp: 90 tablet, Rfl: 11    glipizide (GLUCOTROL) 10 MG tablet, TAKE ONE TABLET BY MOUTH TWICE DAILY BEFORE MEALS, Disp: 180 tablet, Rfl: 0    glucose blood (Accu-Chek Guide) test strip, Test blood sugars once a day., Disp: 100 each, Rfl: 3     "lubiprostone (Amitiza) 24 MCG capsule, Take 1 capsule by mouth 2 (Two) Times a Day., Disp: 180 capsule, Rfl: 3    metFORMIN (GLUCOPHAGE) 500 MG tablet, TAKE 1 TABLET BY MOUTH 2 TIMES A DAY WITH MEALS, Disp: 180 tablet, Rfl: 0    Morphine (MS CONTIN) 15 MG 12 hr tablet, Take 1 tablet by mouth 3 (Three) Times a Day., Disp: 90 tablet, Rfl: 0    Morphine (MS CONTIN) 15 MG 12 hr tablet, Take 1 tablet by mouth 3 (Three) Times a Day As Needed for Severe Pain. DNF before 5/24/2023, Disp: 90 tablet, Rfl: 0    multivitamin (THERAGRAN) tablet tablet, Take  by mouth Daily. Calcium+magnesium+zinc \"Sometimes\", Disp: , Rfl:     NON FORMULARY, Comfort guardian 24-- for pain - ordered meds-- helps with arm pain, Disp: , Rfl:     pentoxifylline (TRENtal) 400 MG CR tablet, Take 1 tablet by mouth 3 (Three) Times a Day., Disp: 270 tablet, Rfl: 3    Syringe/Needle, Disp, (B-D 3CC LUER-RONY SYR 25GX1\") 25G X 1\" 3 ML misc, B12 injection once week, Disp: 12 each, Rfl: 3    Family History:  Family History   Problem Relation Age of Onset    Dementia Mother     Heart disease Mother     COPD Father     Stroke Father     Heart disease Father     Hypertension Sister     Diabetes Sister     Hypertension Brother        Past Medical History:  Past Medical History:   Diagnosis Date    Adenomatous polyps     Amaurosis fugax 3/27/2017    Arm pain     left into hand    Arm pain     Arm pain     left--- wk comp --injury 6/15/2015    Blood in urine     3/2020    BPH (benign prostatic hyperplasia)     CAD (coronary artery disease)     CKD (chronic kidney disease) stage 3, GFR 30-59 ml/min     CVA, old, speech/language deficit     Diabetes     Diabetic acetonemia     Hearing loss     Hematuria     Hernia, inguinal     Hyperlipidemia     Hypertension     Kidney stones     Low back pain     Neuropathic pain     Obstructive uropathy     Shoulder pain     Shoulder pain     Sleep disorder     Spondylosis, cervical     Urgency of urination        Past surgical " History:  Past Surgical History:   Procedure Laterality Date    ANKLE SURGERY      replacement  rt    CAROTID ARTERY ANGIOPLASTY      CARPAL TUNNEL RELEASE      COLONOSCOPY      CYSTOSCOPY URETEROSCOPY LASER LITHOTRIPSY      kidney stones    EYE SURGERY      mesh  and plate under rt eye due to orbital fx    NOSE SURGERY      x2    OTHER SURGICAL HISTORY      uro  lift  1 /2021    TEETH EXTRACTION      dentures       Social History:  Social History     Socioeconomic History    Marital status: Significant Other   Tobacco Use    Smoking status: Never    Smokeless tobacco: Never   Vaping Use    Vaping Use: Never used   Substance and Sexual Activity    Alcohol use: No    Drug use: Never    Sexual activity: Defer       Review of Systems:  The following systems were reviewed as they relate to the cardiovascular system: Constitutional, Eyes, ENT, Cardiovascular, Respiratory, Gastrointestinal, Integumentary, Neurological, Psychiatric, Hematologic, Endocrine, Musculoskeletal, and Genitourinary. The pertinent cardiovascular findings are reported above with all other cardiovascular points within those systems being negative.    Diagnostic Study Review:     Current Electrocardiogram:    ECG 12 Lead    Date/Time: 6/7/2023 1:50 PM  Performed by: Yury Iraheta MD  Authorized by: Yury Iraheta MD   Comparison: compared with previous ECG   Similar to previous ECG  Rhythm: sinus rhythm and sinus bradycardia  Rate: normal  BPM: 55  Conduction: conduction normal  QRS axis: normal  Other findings: non-specific ST-T wave changes    Clinical impression: abnormal EKG  Comments: Inferior Q waves          NOTE: The following portions of the patient's history were reviewed and updated this visit as appropriate: allergies, current medications, past family history, past medical history, past social history, past surgical history and problem list.

## 2023-06-15 RX ORDER — ATENOLOL 50 MG/1
TABLET ORAL
Qty: 90 TABLET | Refills: 0 | Status: SHIPPED | OUTPATIENT
Start: 2023-06-15

## 2023-06-15 RX ORDER — AMLODIPINE BESYLATE 5 MG/1
TABLET ORAL
Qty: 180 TABLET | Refills: 0 | Status: SHIPPED | OUTPATIENT
Start: 2023-06-15

## 2023-07-25 ENCOUNTER — OFFICE VISIT (OUTPATIENT)
Dept: FAMILY MEDICINE CLINIC | Facility: CLINIC | Age: 73
End: 2023-07-25
Payer: MEDICARE

## 2023-07-25 VITALS
BODY MASS INDEX: 27.58 KG/M2 | OXYGEN SATURATION: 98 % | DIASTOLIC BLOOD PRESSURE: 60 MMHG | SYSTOLIC BLOOD PRESSURE: 136 MMHG | HEIGHT: 68 IN | WEIGHT: 182 LBS | HEART RATE: 59 BPM | TEMPERATURE: 98.1 F

## 2023-07-25 DIAGNOSIS — E55.9 VITAMIN D DEFICIENCY: Chronic | ICD-10-CM

## 2023-07-25 DIAGNOSIS — G89.4 CHRONIC PAIN SYNDROME: Chronic | ICD-10-CM

## 2023-07-25 DIAGNOSIS — I10 PRIMARY HYPERTENSION: Chronic | ICD-10-CM

## 2023-07-25 DIAGNOSIS — I25.10 CHRONIC CORONARY ARTERY DISEASE: Chronic | ICD-10-CM

## 2023-07-25 DIAGNOSIS — R26.81 UNSTEADY GAIT: Chronic | ICD-10-CM

## 2023-07-25 DIAGNOSIS — E11.65 TYPE 2 DIABETES MELLITUS WITH HYPERGLYCEMIA, WITHOUT LONG-TERM CURRENT USE OF INSULIN: Chronic | ICD-10-CM

## 2023-07-25 DIAGNOSIS — E53.8 B12 DEFICIENCY: Chronic | ICD-10-CM

## 2023-07-25 DIAGNOSIS — N40.0 BENIGN PROSTATIC HYPERPLASIA WITHOUT LOWER URINARY TRACT SYMPTOMS: Chronic | ICD-10-CM

## 2023-07-25 DIAGNOSIS — Z00.00 MEDICARE ANNUAL WELLNESS VISIT, SUBSEQUENT: Primary | ICD-10-CM

## 2023-07-25 DIAGNOSIS — I69.328 SPEECH OR LANGUAGE DEFICIT FOLLOWING CEREBROVASCULAR ACCIDENT: Chronic | ICD-10-CM

## 2023-07-25 DIAGNOSIS — E78.2 MIXED HYPERLIPIDEMIA: Chronic | ICD-10-CM

## 2023-07-25 DIAGNOSIS — I65.23 BILATERAL CAROTID ARTERY STENOSIS: Chronic | ICD-10-CM

## 2023-07-25 PROCEDURE — 99214 OFFICE O/P EST MOD 30 MIN: CPT | Performed by: NURSE PRACTITIONER

## 2023-07-25 PROCEDURE — G0439 PPPS, SUBSEQ VISIT: HCPCS | Performed by: NURSE PRACTITIONER

## 2023-07-25 PROCEDURE — 1159F MED LIST DOCD IN RCRD: CPT | Performed by: NURSE PRACTITIONER

## 2023-07-25 PROCEDURE — 3075F SYST BP GE 130 - 139MM HG: CPT | Performed by: NURSE PRACTITIONER

## 2023-07-25 PROCEDURE — 3078F DIAST BP <80 MM HG: CPT | Performed by: NURSE PRACTITIONER

## 2023-07-25 PROCEDURE — 96160 PT-FOCUSED HLTH RISK ASSMT: CPT | Performed by: NURSE PRACTITIONER

## 2023-07-25 PROCEDURE — 1170F FXNL STATUS ASSESSED: CPT | Performed by: NURSE PRACTITIONER

## 2023-07-25 PROCEDURE — 1160F RVW MEDS BY RX/DR IN RCRD: CPT | Performed by: NURSE PRACTITIONER

## 2023-07-25 PROCEDURE — 3044F HG A1C LEVEL LT 7.0%: CPT | Performed by: NURSE PRACTITIONER

## 2023-07-25 NOTE — PROGRESS NOTES
The ABCs of the Annual Wellness Visit  Subsequent Medicare Wellness Visit    Subjective    Merlin Trujillo is a 73 y.o. male who presents for a Subsequent Medicare Wellness Visit.    The following portions of the patient's history were reviewed and   updated as appropriate: allergies, current medications, past family history, past medical history, past social history, past surgical history, and problem list.    Compared to one year ago, the patient feels his physical   health is the same.    Compared to one year ago, the patient feels his mental   health is the same.    Recent Hospitalizations:  He was not admitted to the hospital during the last year.       Current Medical Providers:  Patient Care Team:  Court Anderson APRN as PCP - General (Nurse Practitioner)  Alice Bagley MD as Consulting Physician (Pain Medicine)  Yury Iraheta MD as Consulting Physician (Cardiology)  Edgar Abdi MD as Consulting Physician (Neurology)  Joseph Abdi MD as Consulting Physician (Urology)    Outpatient Medications Prior to Visit   Medication Sig Dispense Refill    Accu-Chek FastClix Lancets misc Test blood sugars once day 102 each 0    amLODIPine (NORVASC) 5 MG tablet TAKE ONE TABLET BY MOUTH TWICE DAILY 180 tablet 0    aspirin 81 MG EC tablet Take 1 tablet by mouth Daily.      atenolol (TENORMIN) 50 MG tablet TAKE 1/2 TABLET BY MOUTH TWICE DAILY 90 tablet 0    atorvastatin (LIPITOR) 40 MG tablet TAKE ONE TABLET BY MOUTH EVERY NIGHT AT BEDTIME 90 tablet 0    baclofen (LIORESAL) 10 MG tablet Take 1 tablet by mouth 3 (Three) Times a Day. 270 tablet 1    cholecalciferol (VITAMIN D3) 10 MCG (400 UNIT) tablet Take 1 tablet by mouth Daily. 1000 units      cyanocobalamin 1000 MCG/ML injection INJECT 1ML INTO THE APPROPRIATE MUSCLE AS DIRECTED BY PRESCRIBER ONE TIME FOR ONE DOSE 1 mL 5    Gabapentin Enacarbil  MG tablet controlled-release Take 300 mg by mouth every night at bedtime. 90 tablet 3     "Gabapentin Enacarbil  MG tablet controlled-release Take 600 mg by mouth 3 (Three) Times a Day As Needed (pain). 90 tablet 11    glipizide (GLUCOTROL) 10 MG tablet TAKE ONE TABLET BY MOUTH TWICE DAILY BEFORE MEALS 180 tablet 0    glucose blood (Accu-Chek Guide) test strip Test blood sugars once a day. 100 each 3    lubiprostone (Amitiza) 24 MCG capsule Take 1 capsule by mouth 2 (Two) Times a Day. 180 capsule 3    metFORMIN (GLUCOPHAGE) 500 MG tablet TAKE 1 TABLET BY MOUTH 2 TIMES A DAY WITH MEALS 180 tablet 0    Morphine (MS CONTIN) 15 MG 12 hr tablet Take 1 tablet by mouth 3 (Three) Times a Day. 90 tablet 0    Morphine (MS CONTIN) 15 MG 12 hr tablet Take 1 tablet by mouth 3 (Three) Times a Day As Needed for Severe Pain. DNF before 7/22/2023 90 tablet 0    multivitamin (THERAGRAN) tablet tablet Take  by mouth Daily. Calcium+magnesium+zinc  \"Sometimes\"      NON FORMULARY Comfort guardian 24-- for pain - ordered meds-- helps with arm pain      pentoxifylline (TRENtal) 400 MG CR tablet TAKE ONE TABLET BY MOUTH THREE TIMES DAILY 270 tablet 2    Syringe/Needle, Disp, (B-D 3CC LUER-RONY SYR 25GX1\") 25G X 1\" 3 ML misc B12 injection once week 12 each 3     No facility-administered medications prior to visit.       Opioid medication/s are on active medication list.  and I have evaluated his active treatment plan and pain score trends (see table).  Vitals:    07/25/23 0814   PainSc: 0-No pain     I have reviewed the chart for potential of high risk medication and harmful drug interactions in the elderly.          Aspirin is on active medication list. Aspirin use is indicated based on review of current medical condition/s. Pros and cons of this therapy have been discussed today. Benefits of this medication outweigh potential harm.  Patient has been encouraged to continue taking this medication.  .      Patient Active Problem List   Diagnosis    Neuropathic pain    Other long term (current) drug therapy    Abnormal results " "of kidney function studies    Adenomatous polyp of colon    Arthralgia of right ankle    Bilateral carotid artery stenosis    Cervical radiculitis    Chronic coronary artery disease    Chronic kidney disease, stage III (moderate)    Chronic pain    Edema leg    BPH (benign prostatic hyperplasia)    Epidermoid cyst of neck    Family history of diabetes mellitus    Family history of stroke    Fungal dermatitis    Hearing loss    Type 2 diabetes mellitus with hyperglycemia    Hyperlipidemia    Hypertension    Neck pain    Low back pain    Osteoarthritis of cervical 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    Shoulder pain    Sleep disorder    Speech or language deficit following cerebrovascular accident    Unsteady gait    Left median nerve neuropathy    Radial neuropathy    Osteoarthritis of left shoulder    Tear of left rotator cuff    Ulnar neuropathy of left upper extremity    Medicare annual wellness visit, subsequent    Cholesterol retinal embolus    Partial retinal artery occlusion    Closed fracture of thoracic vertebra without spinal cord injury    Lumbosacral spondylosis without myelopathy    Keratotic lesion    Need for hepatitis C screening test    B12 deficiency    Abscess of back    Vitamin D deficiency     Advance Care Planning   Advance Care Planning     Advance Directive is not on file.  ACP discussion was held with the patient during this visit. Patient does not have an advance directive, information provided.     Objective    Vitals:    07/25/23 0814 07/25/23 0839   BP:  136/60   Pulse: 59    Temp: 98.1 °F (36.7 °C)    TempSrc: Infrared    SpO2: 98%    Weight: 82.6 kg (182 lb)    Height: 172.7 cm (68\")    PainSc: 0-No pain      Estimated body mass index is 27.67 kg/m² as calculated from the following:    Height as of this encounter: 172.7 cm (68\").    Weight as of this encounter: 82.6 kg (182 lb).    BMI is >= 25 and <30. (Overweight) The following options were offered after discussion;: exercise " counseling/recommendations      Does the patient have evidence of cognitive impairment? No          HEALTH RISK ASSESSMENT    Smoking Status:  Social History     Tobacco Use   Smoking Status Never   Smokeless Tobacco Never     Alcohol Consumption:  Social History     Substance and Sexual Activity   Alcohol Use No     Fall Risk Screen:    CHERYL Fall Risk Assessment was completed, and patient is at MODERATE risk for falls. Assessment completed on:2023    Depression Screenin/25/2023     8:17 AM   PHQ-2/PHQ-9 Depression Screening   Little Interest or Pleasure in Doing Things 0-->not at all   Feeling Down, Depressed or Hopeless 1-->several days   PHQ-9: Brief Depression Severity Measure Score 1       Health Habits and Functional and Cognitive Screenin/25/2023     8:16 AM   Functional & Cognitive Status   Do you have difficulty preparing food and eating? No   Do you have difficulty bathing yourself, getting dressed or grooming yourself? No   Do you have difficulty using the toilet? No   Do you have difficulty moving around from place to place? No   Do you have trouble with steps or getting out of a bed or a chair? No   Current Diet Well Balanced Diet   Dental Exam Up to date   Eye Exam Up to date   Exercise (times per week) 4 times per week        Exercise Comment playing with dog   Do you need help using the phone?  No   Are you deaf or do you have serious difficulty hearing?  Yes   Do you need help to go to places out of walking distance? No   Do you need help shopping? No   Do you need help preparing meals?  No   Do you need help with housework?  No   Do you need help with laundry? No   Do you need help taking your medications? No   Do you need help managing money? No   Do you ever drive or ride in a car without wearing a seat belt? No   Have you felt unusual stress, anger or loneliness in the last month? No   Who do you live with? Spouse   If you need help, do you have trouble finding someone  available to you? No   Have you been bothered in the last four weeks by sexual problems? No   Do you have difficulty concentrating, remembering or making decisions? No       Age-appropriate Screening Schedule:  Refer to the list below for future screening recommendations based on patient's age, sex and/or medical conditions. Orders for these recommended tests are listed in the plan section. The patient has been provided with a written plan.    Health Maintenance   Topic Date Due    TDAP/TD VACCINES (1 - Tdap) Never done    HEMOGLOBIN A1C  07/05/2023    COVID-19 Vaccine (4 - Pfizer series) 07/27/2023 (Originally 1/26/2022)    INFLUENZA VACCINE  10/01/2023    DIABETIC FOOT EXAM  01/05/2024    LIPID PANEL  01/05/2024    URINE MICROALBUMIN  01/05/2024    DIABETIC EYE EXAM  02/25/2024    ANNUAL WELLNESS VISIT  07/25/2024    COLORECTAL CANCER SCREENING  01/03/2030    HEPATITIS C SCREENING  Completed    Pneumococcal Vaccine 65+  Completed    ZOSTER VACCINE  Completed                  CMS Preventative Services Quick Reference  Risk Factors Identified During Encounter  Fall Risk-High or Moderate: Discussed Fall Prevention in the home  The above risks/problems have been discussed with the patient.  Pertinent information has been shared with the patient in the After Visit Summary.  An After Visit Summary and PPPS were made available to the patient.    Follow Up:   Next Medicare Wellness visit to be scheduled in 1 year.       Additional E&M Note during same encounter follows:  Patient has multiple medical problems which are significant and separately identifiable that require additional work above and beyond the Medicare Wellness Visit.      Chief Complaint  Medicare Wellness-subsequent (Sub mcw) and Hypertension (6 month f/u)    Subjective        Hypertension    Merlin WHITE Cassandra is also being seen today for management of his chronic medical problems: chronic pain, hypertension, hyperlipidemia, b12, diabetes, cAD,  "BPH    Chronic pain followed by pain management. Baclofen 10 mg tid gabapentin enacarbil ER 300mg at night 600 mg tid. Morphine 15 mg tid .    Diabetes managed by dr Kern metformin 500 mg 1 po bid    Glipizide 10 mg bid checks most days nothing lower 103 never high reports. A1C 6.7 on 1/5/2023.  Microablbumin 43.9 hgih urine microalbumin 2.8 high.     Vit d def taking 400 IU vit d daily 1/2023 vit d normal 54    Constipation: taking amitiza 24 mcg bid     Hypertension: amlodipine 5 mg daily atenolol 50 mg daily  has not medicated this am.    Hyperlipidemia atorvastatin 40 mg daily. Has CAD and is diabetic    CAD ; aspirin 81 mg daily atenolol 50 mg daily has hyperlipidemia.    B12 def getting inj once month. 1/2023 high 1,271 high shot taken on 1/1/2023    Speech or language deficit following cerebrovascular accident taking pentoxifylline to make sure blood goes to brain. He reports had carotid enterectomy. Jomar carotid stenosis. History of TIA with amaurosis fugax in right eye. Significant carotid stenosis right carotid arter. Echo normal LV systolic function.     Neuropathic pain; gabapentin ER prescribed by pain management.     BPH treated with lift procedure . Not needing medications.     CAD followed by cardiology. 6/7/2023 reviewed notes low risk eCG stress test.     Objective   Vital Signs:  /60   Pulse 59   Temp 98.1 °F (36.7 °C) (Infrared)   Ht 172.7 cm (68\")   Wt 82.6 kg (182 lb)   SpO2 98%   BMI 27.67 kg/m²     Physical Exam  Vitals and nursing note reviewed.   Constitutional:       Appearance: Normal appearance.   HENT:      Head: Normocephalic.      Right Ear: External ear normal.      Left Ear: External ear normal.      Nose: Nose normal.      Mouth/Throat:      Mouth: Mucous membranes are moist.   Neck:      Thyroid: No thyromegaly.      Vascular: Carotid bruit present. No JVD.      Trachea: Trachea normal.   Cardiovascular:      Rate and Rhythm: Normal rate and regular rhythm.      Heart " sounds:    with a grade of 3/6.   Skin:     General: Skin is warm.   Neurological:      General: No focal deficit present.      Mental Status: He is alert and oriented to person, place, and time.   Psychiatric:         Mood and Affect: Mood normal.         Behavior: Behavior normal.         Thought Content: Thought content normal.         Judgment: Judgment normal.                       Assessment and Plan   Diagnoses and all orders for this visit:    1. Medicare annual wellness visit, subsequent (Primary)  Comments:  completed needs advanced care directive    2. Chronic coronary artery disease  Comments:  stable followed by cardiology    3. Mixed hyperlipidemia  Comments:  stable    4. Primary hypertension  Comments:  stable    5. B12 deficiency  Comments:  stable    6. Type 2 diabetes mellitus with hyperglycemia, without long-term current use of insulin  Comments:  stable followed by endocrinology    7. Vitamin D deficiency  Comments:  stable    8. Benign prostatic hyperplasia without lower urinary tract symptoms  Comments:  stable followed by urology    9. Chronic pain syndrome  Comments:  stable followed by pain managment    10. Unsteady gait  Comments:  using cane discussed pain medicaitons and risk for fall    11. Speech or language deficit following cerebrovascular accident  Comments:  stable    12. Bilateral carotid artery stenosis  Comments:  stable             Follow Up   Return if symptoms worsen or fail to improve.  Patient was given instructions and counseling regarding his condition or for health maintenance advice. Please see specific information pulled into the AVS if appropriate.

## 2023-07-25 NOTE — PATIENT INSTRUCTIONS
Get us copy of advanced care directive  Fall Prevention in the Home, Adult  Falls can cause injuries and affect people of all ages. There are many simple things that you can do to make your home safe and to help prevent falls. Ask for help when making these changes, if needed.  What actions can I take to prevent falls?  General instructions  Use good lighting in all rooms. Replace any light bulbs that burn out, turn on lights if it is dark, and use night-lights.  Place frequently used items in easy-to-reach places. Lower the shelves around your home if necessary.  Set up furniture so that there are clear paths around it. Avoid moving your furniture around.  Remove throw rugs and other tripping hazards from the floor.  Avoid walking on wet floors.  Fix any uneven floor surfaces.  Add color or contrast paint or tape to grab bars and handrails in your home. Place contrasting color strips on the first and last steps of staircases.  When you use a stepladder, make sure that it is completely opened and that the sides and supports are firmly locked. Have someone hold the ladder while you are using it. Do not climb a closed stepladder.  Know where your pets are when moving through your home.  What can I do in the bathroom?         Keep the floor dry. Immediately clean up any water that is on the floor.  Remove soap buildup in the tub or shower regularly.  Use nonskid mats or decals on the floor of the tub or shower.  Attach bath mats securely with double-sided, nonslip rug tape.  If you need to sit down while you are in the shower, use a plastic, nonslip stool.  Install grab bars by the toilet and in the tub and shower. Do not use towel bars as grab bars.  What can I do in the bedroom?  Make sure that a bedside light is easy to reach.  Do not use oversized bedding that reaches the floor.  Have a firm chair that has side arms to use for getting dressed.  What can I do in the kitchen?  Clean up any spills right away.  If you  need to reach for something above you, use a sturdy step stool that has a grab bar.  Keep electrical cables out of the way.  Do not use floor polish or wax that makes floors slippery. If you must use wax, make sure that it is non-skid floor wax.  What can I do with my stairs?  Do not leave any items on the stairs.  Make sure that you have a light switch at the top and the bottom of the stairs. Have them installed if you do not have them.  Make sure that there are handrails on both sides of the stairs. Fix handrails that are broken or loose. Make sure that handrails are as long as the staircases.  Install non-slip stair treads on all stairs in your home.  Avoid having throw rugs at the top or bottom of stairs, or secure the rugs with carpet tape to prevent them from moving.  Choose a carpet design that does not hide the edge of steps on the stairs.  Check any carpeting to make sure that it is firmly attached to the stairs. Fix any carpet that is loose or worn.  What can I do on the outside of my home?  Use bright outdoor lighting.  Regularly repair the edges of walkways and driveways and fix any cracks.  Remove high doorway thresholds.  Trim any shrubbery on the main path into your home.  Regularly check that handrails are securely fastened and in good repair. Both sides of all steps should have handrails.  Install guardrails along the edges of any raised decks or porches.  Clear walkways of debris and clutter, including tools and rocks.  Have leaves, snow, and ice cleared regularly.  Use sand or salt on walkways during winter months.  In the garage, clean up any spills right away, including grease or oil spills.  What other actions can I take?  Wear closed-toe shoes that fit well and support your feet. Wear shoes that have rubber soles or low heels.  Use mobility aids as needed, such as canes, walkers, scooters, and crutches.  Review your medicines with your health care provider. Some medicines can cause dizziness  or changes in blood pressure, which increase your risk of falling.  Talk with your health care provider about other ways that you can decrease your risk of falls. This may include working with a physical therapist or  to improve your strength, balance, and endurance.  Where to find more information  Centers for Disease Control and PreventionCHERYL: www.cdc.gov  [employer] [place] on Aging: www.saira.nih.gov  Contact a health care provider if:  You are afraid of falling at home.  You feel weak, drowsy, or dizzy at home.  You fall at home.  Summary  There are many simple things that you can do to make your home safe and to help prevent falls.  Ways to make your home safe include removing tripping hazards and installing grab bars in the bathroom.  Ask for help when making these changes in your home.  This information is not intended to replace advice given to you by your health care provider. Make sure you discuss any questions you have with your health care provider.  Document Revised: 09/19/2022 Document Reviewed: 07/21/2021  [employer] Patient Education © 2023 [employer]    Sit-to-Stand Exercise    The sit-to-stand exercise (also known as the chair stand or chair rise exercise) strengthens your lower body and helps you maintain or improve your mobility and independence. The end goal is to do the sit-to-stand exercise without using your hands. This will be easier as you become stronger. You should always talk with your health care provider before starting any exercise program, especially if you have had recent surgery.  Do the exercise exactly as told by your health care provider and adjust it as directed. It is normal to feel mild stretching, pulling, tightness, or discomfort as you do this exercise, but you should stop right away if you feel sudden pain or your pain gets worse. Do not begin doing this exercise until told by your health care provider.  What the sit-to-stand exercise does  The sit-to-stand  exercise helps to strengthen the muscles in your thighs and the muscles in the center of your body that give you stability (core muscles). This exercise is especially helpful if:  You have had knee or hip surgery.  You have trouble getting up from a chair, out of a car, or off the toilet due to muscle weakness.  How to do the sit-to-stand exercise  Sit toward the front edge of a sturdy chair without armrests. Your knees should be bent and your feet should be flat on the floor and shoulder-width apart and underneath your hips.  Place your hands lightly on each side of the seat. Keep your back and neck as straight as possible, with your chest slightly forward.  Breathe in slowly. Lean forward and slightly shift your weight to the front of your feet.  Breathe out as you slowly stand up. Try not to support any weight with your hands.  Stand and pause for a full breath in and out.  Breathe in as you sit down slowly. Tighten your core and abdominal muscles to control your lowering as much as possible. You should lower yourself back to the chair slowly, not just drop back into the seat.  Breathe out slowly.  Do this exercise 10-15 times. If needed, do it fewer times until you build up strength.  Rest for 1 minute, then do another set of 10-15 repetitions.  To change the difficulty of the sit-to-stand exercise  If the exercise is too difficult, use a chair with sturdy armrests, and push off the armrests to help you come to the standing position. You can also use the armrests to help slowly lower yourself back to sitting. As this gets easier, try to use your arms less. You can also place a firm cushion or pillow on the chair to make the surface higher.  If this exercise is too easy, do not use your arms to help raise or lower yourself. You can also wear a weighted vest, use hand weights, increase your repetitions, or try a lower chair.  General tips  You may feel tired when starting an exercise routine. This is normal.  You  may have muscle soreness that lasts a few days. This is normal. As you get stronger, you may not feel muscle soreness.  Use smooth, steady movements.  Do not  hold your breath during strength exercises. This can cause unsafe changes in your blood pressure.  Breathe in slowly through your nose, and breathe out slowly through your mouth.  Summary  Strengthening your lower body is an important step to help you move safely and independently.  The sit-to-stand exercise helps strengthen the muscles in your thighs and core.  You should always talk with your health care provider before starting any exercise program, especially if you have had recent surgery.  This information is not intended to replace advice given to you by your health care provider. Make sure you discuss any questions you have with your health care provider.  Document Revised: 04/10/2022 Document Reviewed: 04/10/2022  [employer] Patient Education © 2023 [employer]        Eat healthy and exercise.

## 2023-08-16 ENCOUNTER — OFFICE VISIT (OUTPATIENT)
Dept: PAIN MEDICINE | Facility: CLINIC | Age: 73
End: 2023-08-16
Payer: MEDICARE

## 2023-08-16 VITALS
HEART RATE: 58 BPM | DIASTOLIC BLOOD PRESSURE: 62 MMHG | SYSTOLIC BLOOD PRESSURE: 123 MMHG | OXYGEN SATURATION: 95 % | RESPIRATION RATE: 16 BRPM

## 2023-08-16 DIAGNOSIS — Z79.899 HIGH RISK MEDICATION USE: Primary | ICD-10-CM

## 2023-08-16 DIAGNOSIS — G89.29 CHRONIC LEFT SHOULDER PAIN: ICD-10-CM

## 2023-08-16 DIAGNOSIS — G89.4 CHRONIC PAIN SYNDROME: Primary | ICD-10-CM

## 2023-08-16 DIAGNOSIS — M25.512 CHRONIC LEFT SHOULDER PAIN: ICD-10-CM

## 2023-08-16 DIAGNOSIS — M79.2 NEUROPATHIC PAIN: ICD-10-CM

## 2023-08-16 DIAGNOSIS — Z79.899 HIGH RISK MEDICATION USE: ICD-10-CM

## 2023-08-16 DIAGNOSIS — M47.817 LUMBOSACRAL SPONDYLOSIS WITHOUT MYELOPATHY: ICD-10-CM

## 2023-08-16 RX ORDER — LUBIPROSTONE 24 UG/1
CAPSULE ORAL
Qty: 180 CAPSULE | Refills: 2 | Status: SHIPPED | OUTPATIENT
Start: 2023-08-16

## 2023-08-16 RX ORDER — MORPHINE SULFATE 15 MG/1
15 TABLET, FILM COATED, EXTENDED RELEASE ORAL 3 TIMES DAILY
Qty: 90 TABLET | Refills: 0 | Status: SHIPPED | OUTPATIENT
Start: 2023-08-21

## 2023-08-16 RX ORDER — MORPHINE SULFATE 15 MG/1
15 TABLET, FILM COATED, EXTENDED RELEASE ORAL 3 TIMES DAILY PRN
Qty: 90 TABLET | Refills: 0 | Status: SHIPPED | OUTPATIENT
Start: 2023-09-20
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
## 2023-08-17 RX ORDER — CYANOCOBALAMIN 1000 UG/ML
INJECTION, SOLUTION INTRAMUSCULAR; SUBCUTANEOUS
Qty: 1 ML | Refills: 4 | Status: SHIPPED | OUTPATIENT
Start: 2023-08-17

## 2023-08-17 NOTE — ASSESSMENT & PLAN NOTE
Diabetes is improving with treatment.   Regular aerobic exercise.  Discussed ways to avoid symptomatic hypoglycemia.  Discussed sick day management.  Discussed foot care.  Reminded to get yearly retinal exam.  Diabetes will be reassessed in 6 months.   66F HTN, gout, hypothyroidism, L. DVT, atrial fibrillation, COPD on home O2 (prior environmental exposure as [place] ), SONU on CPAP, obesity s/p gastric sleeve, chronic anemia here with melena, acute on chronic anemia, generalized weakness. Found to have severe metabolic acidosis pH 7.1, HCO3: 10 s/p bicarb drip and bicarb po supplement. Hospital course with acute gout flare started on prednisone. Seen by GI s/p EGD 8/16 without acute findings and unable to identify cause/source of melena. s/p colonoscopy 8/17 with internal hemorrhoids, hepatic flexure angioectasia s/p APC, and removal of hepatic flexure 5mm polyp. Noted to have LFT elevation on blood work.    DX: GI bleed, anemia due to blood loss, melena, metabolic acidosis, VY on CKD, COPD with oxygen dependence, chronic hypoxic respiratory failure, SONU on CPAP, obesity, gout flare, elevated LFTs, hepatic flexure polyp and angioectasia    - stable respiratory status  - tolerated EGD and colonoscopy well from respiratory standpoint, no respiratory complications  - on 2-3L NC supplementation  - cont nocturnal bipap for SONU  - cont hospital interchange for home trelegy with symbicort and spiriva, upon discharge can resume home trelegy  - encouraged compliance with nocturnal NIV for underlying SONU  - full dose AC held for procedure, to resume tomorrow  - s/p 1 unit pRBC 8/10  - metabolic acidosis improved with improving renal function and bicarb supplementation  - unclear why liver enzymes elevated (but continues to downtrend) ?medication induced (colchicine), would cont to monitor levels  - on protonix twice daily in setting of concerns for upper GI bleed, however EGD overall normal, can likely change to protonix daily, recommend changing to po route as well  - tolerating po diet  - complete 5 day course of prednisone for gout

## 2023-09-14 RX ORDER — ATENOLOL 50 MG/1
TABLET ORAL
Qty: 90 TABLET | Refills: 0 | Status: SHIPPED | OUTPATIENT
Start: 2023-09-14

## 2023-09-14 RX ORDER — AMLODIPINE BESYLATE 5 MG/1
TABLET ORAL
Qty: 180 TABLET | Refills: 0 | Status: SHIPPED | OUTPATIENT
Start: 2023-09-14

## 2023-09-14 RX ORDER — ATORVASTATIN CALCIUM 40 MG/1
TABLET, FILM COATED ORAL
Qty: 90 TABLET | Refills: 0 | Status: SHIPPED | OUTPATIENT
Start: 2023-09-14

## 2023-09-26 RX ORDER — BLOOD SUGAR DIAGNOSTIC
STRIP MISCELLANEOUS
Qty: 100 EACH | Refills: 3 | Status: SHIPPED | OUTPATIENT
Start: 2023-09-26

## 2023-10-11 ENCOUNTER — OFFICE VISIT (OUTPATIENT)
Dept: PAIN MEDICINE | Facility: CLINIC | Age: 73
End: 2023-10-11
Payer: MEDICARE

## 2023-10-11 VITALS
OXYGEN SATURATION: 96 % | DIASTOLIC BLOOD PRESSURE: 66 MMHG | RESPIRATION RATE: 16 BRPM | HEART RATE: 60 BPM | SYSTOLIC BLOOD PRESSURE: 128 MMHG

## 2023-10-11 DIAGNOSIS — M25.512 CHRONIC LEFT SHOULDER PAIN: ICD-10-CM

## 2023-10-11 DIAGNOSIS — G89.4 CHRONIC PAIN SYNDROME: Primary | ICD-10-CM

## 2023-10-11 DIAGNOSIS — Z79.899 HIGH RISK MEDICATION USE: ICD-10-CM

## 2023-10-11 DIAGNOSIS — M47.817 LUMBOSACRAL SPONDYLOSIS WITHOUT MYELOPATHY: ICD-10-CM

## 2023-10-11 DIAGNOSIS — G89.29 CHRONIC LEFT SHOULDER PAIN: ICD-10-CM

## 2023-10-11 DIAGNOSIS — M79.2 NEUROPATHIC PAIN: ICD-10-CM

## 2023-10-11 DIAGNOSIS — M47.812 CERVICAL SPONDYLOSIS WITHOUT MYELOPATHY: ICD-10-CM

## 2023-10-11 RX ORDER — MORPHINE SULFATE 15 MG/1
15 TABLET, FILM COATED, EXTENDED RELEASE ORAL 3 TIMES DAILY
Qty: 90 TABLET | Refills: 0 | Status: SHIPPED | OUTPATIENT
Start: 2023-10-20

## 2023-10-11 RX ORDER — MORPHINE SULFATE 15 MG/1
15 TABLET, FILM COATED, EXTENDED RELEASE ORAL 3 TIMES DAILY PRN
Qty: 90 TABLET | Refills: 0 | Status: SHIPPED | OUTPATIENT
Start: 2023-11-20
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
## 2023-10-17 RX ORDER — SYRINGE WITH NEEDLE, 1 ML 25GX5/8"
SYRINGE, EMPTY DISPOSABLE MISCELLANEOUS
Qty: 12 EACH | Refills: 2 | Status: SHIPPED | OUTPATIENT
Start: 2023-10-17

## 2023-10-17 RX ORDER — GLIPIZIDE 10 MG/1
TABLET ORAL
Qty: 180 TABLET | Refills: 0 | Status: SHIPPED | OUTPATIENT
Start: 2023-10-17

## 2023-11-15 RX ORDER — BACLOFEN 10 MG/1
10 TABLET ORAL 3 TIMES DAILY
Qty: 270 TABLET | Refills: 0 | Status: SHIPPED | OUTPATIENT
Start: 2023-11-15

## 2023-11-21 ENCOUNTER — TELEPHONE (OUTPATIENT)
Dept: PAIN MEDICINE | Facility: CLINIC | Age: 73
End: 2023-11-21
Payer: MEDICARE

## 2023-11-21 NOTE — TELEPHONE ENCOUNTER
11/21/23-- DR CRAFT-- PT LEFT VM THAT [facility] WONT PAY FOR HORRIZANT ANY MORE-- NEEDS  INSTEAD--GABAPENTIN CALLED IN IN BOTH STRENGTHS TO [facility]  PT CALL BACK # is [phone]     NEEDS GABAPENTIN 600MG 3TIMES A DAY AND  GABAPENTIN 300 ONCE AT NIGHT INSTEAD

## 2023-11-27 RX ORDER — GABAPENTIN 600 MG/1
600 TABLET ORAL 3 TIMES DAILY
Qty: 90 TABLET | Refills: 5 | Status: SHIPPED | OUTPATIENT
Start: 2023-11-27

## 2023-11-27 RX ORDER — GABAPENTIN 300 MG/1
300 CAPSULE ORAL NIGHTLY
Qty: 90 CAPSULE | Refills: 3 | Status: SHIPPED | OUTPATIENT
Start: 2023-11-27

## 2023-12-06 ENCOUNTER — OFFICE VISIT (OUTPATIENT)
Dept: CARDIOLOGY | Facility: CLINIC | Age: 73
End: 2023-12-06
Payer: MEDICARE

## 2023-12-06 VITALS
HEIGHT: 68 IN | SYSTOLIC BLOOD PRESSURE: 150 MMHG | WEIGHT: 180 LBS | HEART RATE: 54 BPM | BODY MASS INDEX: 27.28 KG/M2 | DIASTOLIC BLOOD PRESSURE: 80 MMHG | OXYGEN SATURATION: 97 %

## 2023-12-06 DIAGNOSIS — I65.23 BILATERAL CAROTID ARTERY STENOSIS: ICD-10-CM

## 2023-12-06 DIAGNOSIS — E11.65 TYPE 2 DIABETES MELLITUS WITH HYPERGLYCEMIA, WITHOUT LONG-TERM CURRENT USE OF INSULIN: ICD-10-CM

## 2023-12-06 DIAGNOSIS — I10 PRIMARY HYPERTENSION: ICD-10-CM

## 2023-12-06 DIAGNOSIS — I25.10 CHRONIC CORONARY ARTERY DISEASE: Primary | ICD-10-CM

## 2023-12-06 PROCEDURE — 99214 OFFICE O/P EST MOD 30 MIN: CPT | Performed by: INTERNAL MEDICINE

## 2023-12-06 PROCEDURE — 93000 ELECTROCARDIOGRAM COMPLETE: CPT | Performed by: INTERNAL MEDICINE

## 2023-12-06 PROCEDURE — 3079F DIAST BP 80-89 MM HG: CPT | Performed by: INTERNAL MEDICINE

## 2023-12-06 PROCEDURE — 3077F SYST BP >= 140 MM HG: CPT | Performed by: INTERNAL MEDICINE

## 2023-12-06 NOTE — PROGRESS NOTES
Cardiology Office Visit      Encounter Date:  12/06/2023    Patient ID:   Merlin Trujillo is a 73 y.o. male.    Reason For Followup:  Peripheral artery disease  Carotid stenosis  Hypertension  Hyperlipidemia    Brief Clinical History:  Dear Dr. Lyell, Reggie Duane, MD    I had the pleasure of seeing Merlin Trujillo today. As you are well aware, this is a 73 y.o. male with past medical history that is significant for history of hypertension hyperlipidemia  and peripheral arterial disease here for follow-up    Patient had symptoms of TIA with amaurosis fugax in the right eye.  Noted to have significant carotid stenosis in the right carotid artery  Status post a successful right carotid endarterectomy  Patient had recent hospitalization with urosepsis  Echocardiogram showed normal LV systolic function        Interval History:  Denies any chest pain  Blood pressure somewhat elevated in the office but patient states his home blood pressure readings are optimal  New cardiac symptoms  Denies any dizziness or syncope    Interpretation Summary    Myocardial perfusion imaging indicates a normal myocardial perfusion study with no evidence of ischemia.  Left ventricular ejection fraction is hyperdynamic (Calculated EF > 70%). .  Findings consistent with a normal ECG stress test.  Impressions are consistent with a low risk study.  There is no prior study available for comparison.    1. The patient has a history of prior right carotid surgery a few years  ago. The degree of narrowing is less than 50% by consensus panel  criteria in the right common carotid artery carotid bifurcation.  2. Mild to moderate plaque deposition left carotid bifurcation. The  degree of narrowing is less than 50% by consensus panel criteria.  3. Patent vertebral arteries bilaterally with antegrade flow.       Assessment & Plan    Impressions:     Hypertension   hyperlipidemia   peripheral arterial disease   presumed coronary artery disease but no  "evidence of any inducible ischemia on the stress test  Bilateral carotid stenosis less than 50% on carotid ultrasound that was done in March 2021  Stress test in May 2021 with normal LV systolic function normal wall motion estimated LV ejection fraction of 70% with no inducible ischemia  Chronic renal insufficiency with improvement in the creatinine numbers compared to baseline  [place] hypertension    Recommendations:  Recent myocardial perfusion study that was normal   continued aggressive risk factor modification   regular exercise   labs with primary care physician office  Labs discussed with the patient  Recent labs reviewed and discussed with the patient  Medications reviewed and discussed with patient  Labs and medications reviewed and discussed the patient  Labs from May 2022 reviewed and discussed with the patient  Lipids from March reviewed and discussed with patient lipids are optimal including LDL cholesterol less than 70  Continue current medical therapy with aspirin 81 mg p.o. once a day atenolol 25 mg p.o. twice daily Lipitor 40 mg p.o. once a day  Advised patient to consider discussing with primary care physician about going back on low-dose metformin with normalization of the renal function  Close monitoring of blood pressure at home  Consider repeat carotid ultrasound study next year  Recommend vascular screening study  Home blood pressure monitoring  Recent labs reviewed and discussed with patient  Carotid ultrasound before next office visit  Close monitoring of renal function with primary care physician office   follow up in office in 6 months        Vitals:  Vitals:    12/06/23 1432   BP: 150/80   BP Location: Left arm   Patient Position: Sitting   Pulse: 54   SpO2: 97%   Weight: 81.6 kg (180 lb)   Height: 172.7 cm (68\")       Physical Exam:    General: Alert, cooperative, no distress, appears stated age  Head:  Normocephalic, atraumatic, mucous membranes moist  Eyes:  Conjunctiva/corneas " clear, EOM's intact     Neck:  Supple,  no adenopathy;      Lungs: Clear to auscultation bilaterally, no wheezes rhonchi rales are noted  Chest wall: No tenderness  Heart::  Regular rate and rhythm, S1 and S2 normal, no murmur, rub or gallop  Abdomen: Soft, non-tender, nondistended bowel sounds active  Extremities: No cyanosis, clubbing, or edema  Pulses: 2+ and symmetric all extremities  Skin:  No rashes or lesions  Neuro/psych: A&O x3. CN II through XII are grossly intact with appropriate affect              Lab Results   Component Value Date    GLUCOSE 176 (H) 07/19/2022    BUN 18 07/19/2022    CREATININE 1.24 07/19/2022    EGFRRESULT 58 (L) 03/03/2022    EGFR 61.8 07/19/2022    BCR 14.5 07/19/2022    K 4.8 07/19/2022    CO2 25.0 07/19/2022    CALCIUM 9.6 07/19/2022    PROTENTOTREF 7.8 03/03/2022    ALBUMIN 4.70 07/19/2022    BILITOT 0.5 07/19/2022    AST 24 07/19/2022    ALT 25 07/19/2022        No results found for this or any previous visit.     Lab Results   Component Value Date    CHOL 145 01/05/2023    CHLPL 147 03/03/2022    TRIG 108 01/05/2023    HDL 53 01/05/2023    LDL 72 01/05/2023      Results for orders placed during the hospital encounter of 05/19/21    Stress Test With Myocardial Perfusion One Day    Interpretation Summary  · Myocardial perfusion imaging indicates a normal myocardial perfusion study with no evidence of ischemia.  · Left ventricular ejection fraction is hyperdynamic (Calculated EF > 70%). .  · Findings consistent with a normal ECG stress test.  · Impressions are consistent with a low risk study.  · There is no prior study available for comparison.   Results for orders placed in visit on 04/10/17    CARDIOVASCULAR STUDIES - CONVERTED    Narrative  Nuclear Stress Test Preliminary      Imported By: Daphne Bansal MA 4/10/2017 4:47:10 PM    _____________________________________________________________________    External Attachment:    Type: Image  Comment:  External  Document      Electronically signed by Yury Iraheta MD on 04/11/2017 at 8:58 AM  ________________________________________________________________________      Disclaimer: Converted Note message may not contain all data elements that existed in the [facility] source system. Please see [facility] for the original note details.           Objective:          Allergies:  No Known Allergies    Medication Review:     Current Outpatient Medications:     amLODIPine (NORVASC) 5 MG tablet, TAKE ONE TABLET BY MOUTH TWICE DAILY, Disp: 180 tablet, Rfl: 0    aspirin 81 MG EC tablet, Take 1 tablet by mouth Daily., Disp: , Rfl:     atenolol (TENORMIN) 50 MG tablet, TAKE 1/2 TABLET BY MOUTH TWICE DAILY, Disp: 90 tablet, Rfl: 0    atorvastatin (LIPITOR) 40 MG tablet, TAKE ONE TABLET BY MOUTH EVERY NIGHT AT BEDTIME, Disp: 90 tablet, Rfl: 0    baclofen (LIORESAL) 10 MG tablet, TAKE ONE TABLET BY MOUTH THREE TIMES DAILY, Disp: 270 tablet, Rfl: 0    cholecalciferol (VITAMIN D3) 10 MCG (400 UNIT) tablet, Take 1 tablet by mouth Daily. 1000 units, Disp: , Rfl:     cyanocobalamin 1000 MCG/ML injection, INJECT 1ML INTO THE APPROPRIATE MUSCLE AS DIRECTED BY PRESCRIBER ONE TIME FOR ONE DOSE, Disp: 1 mL, Rfl: 4    gabapentin (NEURONTIN) 300 MG capsule, Take 1 capsule by mouth Every Night., Disp: 90 capsule, Rfl: 3    gabapentin (NEURONTIN) 600 MG tablet, Take 1 tablet by mouth 3 (Three) Times a Day., Disp: 90 tablet, Rfl: 5    glipizide (GLUCOTROL) 10 MG tablet, TAKE ONE TABLET BY MOUTH TWICE DAILY BEFORE MEALS, Disp: 180 tablet, Rfl: 0    lubiprostone (AMITIZA) 24 MCG capsule, TAKE 1 CAPSULE BY MOUTH TWICE DAILY, Disp: 180 capsule, Rfl: 2    metFORMIN (GLUCOPHAGE) 500 MG tablet, TAKE 1 TABLET BY MOUTH 2 TIMES A DAY WITH MEALS, Disp: 180 tablet, Rfl: 0    Morphine (MS CONTIN) 15 MG 12 hr tablet, Take 1 tablet by mouth 3 (Three) Times a Day., Disp: 90 tablet, Rfl: 0    multivitamin (THERAGRAN) tablet tablet, Take  by mouth  "Daily. Calcium+magnesium+zinc \"Sometimes\", Disp: , Rfl:     pentoxifylline (TRENtal) 400 MG CR tablet, TAKE ONE TABLET BY MOUTH THREE TIMES DAILY, Disp: 270 tablet, Rfl: 2    Accu-Chek FastClix Lancets misc, Test blood sugars once day, Disp: 102 each, Rfl: 0    glucose blood (Accu-Chek Guide) test strip, Test blood sugars once a day., Disp: 100 each, Rfl: 3    Morphine (MS CONTIN) 15 MG 12 hr tablet, Take 1 tablet by mouth 3 (Three) Times a Day As Needed for Severe Pain. DNF before 11/20/2023, Disp: 90 tablet, Rfl: 0    NON FORMULARY, Comfort guardian 24-- for pain - ordered meds-- helps with arm pain, Disp: , Rfl:     Syringe/Needle, Disp, (B-D 3CC LUER-RONY SYR 25GX1\") 25G X 1\" 3 ML misc, USE AS DIRECTED WITH B-12 INJECTION, Disp: 12 each, Rfl: 2    Family History:  Family History   Problem Relation Age of Onset    Dementia Mother     Heart disease Mother     COPD Father     Stroke Father     Heart disease Father     Hypertension Sister     Diabetes Sister     Hypertension Brother        Past Medical History:  Past Medical History:   Diagnosis Date    Adenomatous polyps     Amaurosis fugax 3/27/2017    Arm pain     left into hand    Arm pain     Arm pain     left--- wk comp --injury 6/15/2015    Blood in urine     3/2020    BPH (benign prostatic hyperplasia)     CAD (coronary artery disease)     CKD (chronic kidney disease) stage 3, GFR 30-59 ml/min     CVA, old, speech/language deficit     Diabetes     Diabetic acetonemia     Hearing loss     Hematuria     Hernia, inguinal     Hyperlipidemia     Hypertension     Kidney stones     Low back pain     Neuropathic pain     Obstructive uropathy     Shoulder pain     Shoulder pain     Sleep disorder     Spondylosis, cervical     Urgency of urination        Past surgical History:  Past Surgical History:   Procedure Laterality Date    ANKLE SURGERY      replacement  rt    CAROTID ARTERY ANGIOPLASTY      CARPAL TUNNEL RELEASE      COLONOSCOPY      CYSTOSCOPY URETEROSCOPY " LASER LITHOTRIPSY      kidney stones    EYE SURGERY      mesh  and plate under rt eye due to orbital fx    NOSE SURGERY      x2    OTHER SURGICAL HISTORY      uro  lift  1 /2021    TEETH EXTRACTION      dentures       Social History:  Social History     Socioeconomic History    Marital status: Significant Other   Tobacco Use    Smoking status: Never     Passive exposure: Past    Smokeless tobacco: Never   Vaping Use    Vaping Use: Never used   Substance and Sexual Activity    Alcohol use: No    Drug use: Never    Sexual activity: Defer       Review of Systems:  The following systems were reviewed as they relate to the cardiovascular system: Constitutional, Eyes, ENT, Cardiovascular, Respiratory, Gastrointestinal, Integumentary, Neurological, Psychiatric, Hematologic, Endocrine, Musculoskeletal, and Genitourinary. The pertinent cardiovascular findings are reported above with all other cardiovascular points within those systems being negative.    Diagnostic Study Review:     Current Electrocardiogram:    ECG 12 Lead    Date/Time: 12/6/2023 2:55 PM  Performed by: Yury Iraheta MD    Authorized by: Yury Iraheta MD  Comparison: compared with previous ECG   Similar to previous ECG  Rhythm: sinus rhythm and sinus bradycardia  Rate: normal  BPM: 53  Conduction: conduction normal  QRS axis: normal  Other findings: non-specific ST-T wave changes    Clinical impression: abnormal EKG                NOTE: The following portions of the patient's history were reviewed and updated this visit as appropriate: allergies, current medications, past family history, past medical history, past social history, past surgical history and problem list.

## 2023-12-14 RX ORDER — ATENOLOL 50 MG/1
TABLET ORAL
Qty: 90 TABLET | Refills: 0 | Status: SHIPPED | OUTPATIENT
Start: 2023-12-14

## 2023-12-14 RX ORDER — ATORVASTATIN CALCIUM 40 MG/1
TABLET, FILM COATED ORAL
Qty: 90 TABLET | Refills: 0 | Status: SHIPPED | OUTPATIENT
Start: 2023-12-14

## 2023-12-14 RX ORDER — AMLODIPINE BESYLATE 5 MG/1
TABLET ORAL
Qty: 180 TABLET | Refills: 0 | Status: SHIPPED | OUTPATIENT
Start: 2023-12-14

## 2023-12-15 ENCOUNTER — HOSPITAL ENCOUNTER (OUTPATIENT)
Dept: CARDIOLOGY | Facility: HOSPITAL | Age: 73
Discharge: HOME | End: 2023-12-15
Payer: MEDICARE

## 2023-12-15 DIAGNOSIS — I65.23 BILATERAL CAROTID ARTERY STENOSIS: ICD-10-CM

## 2023-12-15 DIAGNOSIS — I25.10 CHRONIC CORONARY ARTERY DISEASE: ICD-10-CM

## 2023-12-15 DIAGNOSIS — I10 PRIMARY HYPERTENSION: ICD-10-CM

## 2023-12-15 DIAGNOSIS — E11.65 TYPE 2 DIABETES MELLITUS WITH HYPERGLYCEMIA, WITHOUT LONG-TERM CURRENT USE OF INSULIN: ICD-10-CM

## 2023-12-15 LAB
BH CV XLRA MEAS LEFT DIST CCA EDV: -18.6 CM/SEC
BH CV XLRA MEAS LEFT DIST CCA PSV: -65.2 CM/SEC
BH CV XLRA MEAS LEFT DIST ICA EDV: -32.1 CM/SEC
BH CV XLRA MEAS LEFT DIST ICA PSV: -104 CM/SEC
BH CV XLRA MEAS LEFT ICA/CCA RATIO: 1.48
BH CV XLRA MEAS LEFT PROX CCA EDV: -14.3 CM/SEC
BH CV XLRA MEAS LEFT PROX CCA PSV: -78.3 CM/SEC
BH CV XLRA MEAS LEFT PROX ECA PSV: -122 CM/SEC
BH CV XLRA MEAS LEFT PROX ICA EDV: -32.1 CM/SEC
BH CV XLRA MEAS LEFT PROX ICA PSV: -116 CM/SEC
BH CV XLRA MEAS LEFT PROX SCLA PSV: 112 CM/SEC
BH CV XLRA MEAS LEFT VERTEBRAL A EDV: -17.4 CM/SEC
BH CV XLRA MEAS LEFT VERTEBRAL A PSV: -61.5 CM/SEC
BH CV XLRA MEAS RIGHT DIST CCA EDV: 16.2 CM/SEC
BH CV XLRA MEAS RIGHT DIST CCA PSV: 94.4 CM/SEC
BH CV XLRA MEAS RIGHT DIST ICA EDV: -20.5 CM/SEC
BH CV XLRA MEAS RIGHT DIST ICA PSV: -72.1 CM/SEC
BH CV XLRA MEAS RIGHT ICA/CCA RATIO: -1.27
BH CV XLRA MEAS RIGHT PROX CCA EDV: 14.3 CM/SEC
BH CV XLRA MEAS RIGHT PROX CCA PSV: 60.3 CM/SEC
BH CV XLRA MEAS RIGHT PROX ECA PSV: -101 CM/SEC
BH CV XLRA MEAS RIGHT PROX ICA EDV: -24.3 CM/SEC
BH CV XLRA MEAS RIGHT PROX ICA PSV: -120 CM/SEC
BH CV XLRA MEAS RIGHT PROX SCLA PSV: 136 CM/SEC
BH CV XLRA MEAS RIGHT VERTEBRAL A EDV: -14.3 CM/SEC
BH CV XLRA MEAS RIGHT VERTEBRAL A PSV: -48.5 CM/SEC
LEFT ARM BP: NORMAL MMHG
RIGHT ARM BP: NORMAL MMHG

## 2023-12-15 PROCEDURE — 93880 EXTRACRANIAL BILAT STUDY: CPT

## 2023-12-20 ENCOUNTER — OFFICE VISIT (OUTPATIENT)
Dept: PAIN MEDICINE | Facility: CLINIC | Age: 73
End: 2023-12-20
Payer: MEDICARE

## 2023-12-20 VITALS
BODY MASS INDEX: 27.83 KG/M2 | DIASTOLIC BLOOD PRESSURE: 68 MMHG | RESPIRATION RATE: 16 BRPM | HEART RATE: 55 BPM | WEIGHT: 183 LBS | SYSTOLIC BLOOD PRESSURE: 147 MMHG | OXYGEN SATURATION: 96 %

## 2023-12-20 DIAGNOSIS — M54.16 LUMBAR RADICULITIS: ICD-10-CM

## 2023-12-20 DIAGNOSIS — M79.2 NEUROPATHIC PAIN: ICD-10-CM

## 2023-12-20 DIAGNOSIS — M47.817 LUMBOSACRAL SPONDYLOSIS WITHOUT MYELOPATHY: ICD-10-CM

## 2023-12-20 DIAGNOSIS — G89.4 CHRONIC PAIN SYNDROME: Primary | ICD-10-CM

## 2023-12-20 DIAGNOSIS — Z79.899 HIGH RISK MEDICATION USE: ICD-10-CM

## 2023-12-20 DIAGNOSIS — M47.812 CERVICAL SPONDYLOSIS WITHOUT MYELOPATHY: ICD-10-CM

## 2023-12-20 RX ORDER — MORPHINE SULFATE 15 MG/1
15 TABLET, FILM COATED, EXTENDED RELEASE ORAL 3 TIMES DAILY PRN
Qty: 90 TABLET | Refills: 0 | Status: SHIPPED | OUTPATIENT
Start: 2024-01-22

## 2023-12-20 RX ORDER — MORPHINE SULFATE 15 MG/1
15 TABLET, FILM COATED, EXTENDED RELEASE ORAL 3 TIMES DAILY
Qty: 90 TABLET | Refills: 0 | Status: SHIPPED | OUTPATIENT
Start: 2023-12-22
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
## 2024-01-05 ENCOUNTER — HOSPITAL ENCOUNTER (OUTPATIENT)
Dept: PAIN MEDICINE | Facility: HOSPITAL | Age: 74
Discharge: HOME | End: 2024-01-05
Payer: MEDICARE

## 2024-01-05 VITALS
BODY MASS INDEX: 27.74 KG/M2 | DIASTOLIC BLOOD PRESSURE: 77 MMHG | OXYGEN SATURATION: 95 % | TEMPERATURE: 97.3 F | WEIGHT: 183 LBS | HEIGHT: 68 IN | RESPIRATION RATE: 16 BRPM | HEART RATE: 54 BPM | SYSTOLIC BLOOD PRESSURE: 134 MMHG

## 2024-01-05 DIAGNOSIS — R52 PAIN: ICD-10-CM

## 2024-01-05 DIAGNOSIS — M54.16 LUMBAR RADICULITIS: Primary | ICD-10-CM

## 2024-01-05 PROCEDURE — 25010000002 METHYLPREDNISOLONE PER 40 MG: Performed by: ANESTHESIOLOGY

## 2024-01-05 PROCEDURE — 25510000001 IOPAMIDOL 41 % SOLUTION: Performed by: ANESTHESIOLOGY

## 2024-01-05 PROCEDURE — 77003 FLUOROGUIDE FOR SPINE INJECT: CPT

## 2024-01-05 RX ORDER — METHYLPREDNISOLONE ACETATE 40 MG/ML
40 INJECTION, SUSPENSION INTRA-ARTICULAR; INTRALESIONAL; INTRAMUSCULAR; SOFT TISSUE ONCE
Status: COMPLETED | OUTPATIENT
Start: 2024-01-05 | End: 2024-01-05

## 2024-01-05 RX ORDER — IOPAMIDOL 408 MG/ML
3 INJECTION, SOLUTION INTRATHECAL
Status: COMPLETED | OUTPATIENT
Start: 2024-01-05 | End: 2024-01-05

## 2024-01-05 RX ADMIN — IOPAMIDOL 3 ML: 408 INJECTION, SOLUTION INTRATHECAL at 08:53

## 2024-01-05 RX ADMIN — METHYLPREDNISOLONE ACETATE 40 MG: 40 INJECTION, SUSPENSION INTRA-ARTICULAR; INTRALESIONAL; INTRAMUSCULAR; INTRASYNOVIAL; SOFT TISSUE at 08:53

## 2024-01-05 NOTE — PROCEDURES
"Subjective    CC back  pain  Merlin Trujillo is a 73 y.o. male lumbar stenosis, lumbar radiculitis here for repeat LESI.  No anticoagulation    Pain Assessment   Location of Pain: Lower Back, R Hip, L Hip, right leg   description of Pain: Dull/Aching, Throbbing, Stabbing  Previous Pain Rating :4  Current Pain Ratin  Aggravating Factors: Activity  Alleviating Factors: Rest, Medication    The following portions of the patient's history were reviewed and updated as appropriate: allergies, current medications, past family history, past medical history, past social history, past surgical history and problem list.  Review of Systems  As in HPI  Objective   Physical Exam  Vitals reviewed.   Constitutional:       General: He is not in acute distress.  Pulmonary:      Effort: Pulmonary effort is normal.       /77 (BP Location: Right arm, Patient Position: Sitting)   Pulse 54   Temp 97.3 °F (36.3 °C) (Skin)   Resp 16   Ht 172.7 cm (68\")   Wt 83 kg (183 lb)   SpO2 95%   BMI 27.83 kg/m²     Assessment & Plan    underwent repeat LESI.    RTC 4-6 weeks or as needed for repeat    DATE OF PROCEDURE: 2024    PREOPERATIVE DIAGNOSIS: lumbar DDD/radiculitis    POSTOPERATIVE DIAGNOSIS: same    PROCEDURE PERFORMED:  lumbar Epidural Steroid Injection    The patient presents with a history of   lumbar degenerative disc disease with  radiculitis. The patient presents today for a  lumbar epidural steroid injection at level  L5/S1.   The patient was seen in the preoperative area.  Patient's consent was obtained and updated.  Vitals were taken.  Patient was then brought to the procedure suite and placed in a prone position. The appropriate anatomic area was widely prepped with Chloraprep and draped in a sterile fashion. Noninvasive monitoring per routine anesthesia protocol was placed.  Under fluoroscopic guidance using  AP view a 20 gauge styleted tuohy needle was passed through skin anesthetized with 1% Lidocaine " without epinephrine.  The needle was advanced using the continuous loss of resistance to saline technique into the L5 epidural space. Needle tip placement in the epidural space was confirmed by loss of resistance and injection of 1 mL of  preservative free contrast.  Following this 8 mL of a solution containing  1 mL of 40 mg Depo-Medrol and 7 mL of preservative-free saline was carefully administered in the epidural space.  A sterile dressing was placed over the puncture site.    The patient tolerated the procedure with no complications . They were then brought to the post procedure area where they recovered nicely.

## 2024-01-05 NOTE — DISCHARGE INSTRUCTIONS

## 2024-01-08 ENCOUNTER — TELEPHONE (OUTPATIENT)
Dept: PAIN MEDICINE | Facility: HOSPITAL | Age: 74
End: 2024-01-08
Payer: MEDICARE

## 2024-01-08 NOTE — TELEPHONE ENCOUNTER
Post procedure phone call completed.  Pt states they are doing good and denies questions or concerns.  Patient reports pain level a #4.

## 2024-01-15 RX ORDER — CYANOCOBALAMIN 1000 UG/ML
INJECTION, SOLUTION INTRAMUSCULAR; SUBCUTANEOUS
Qty: 1 ML | Refills: 3 | Status: SHIPPED | OUTPATIENT
Start: 2024-01-15

## 2024-01-15 RX ORDER — GLIPIZIDE 10 MG/1
TABLET ORAL
Qty: 180 TABLET | Refills: 0 | Status: SHIPPED | OUTPATIENT
Start: 2024-01-15

## 2024-02-05 ENCOUNTER — OFFICE VISIT (OUTPATIENT)
Dept: FAMILY MEDICINE CLINIC | Facility: CLINIC | Age: 74
End: 2024-02-05
Payer: MEDICARE

## 2024-02-05 ENCOUNTER — LAB (OUTPATIENT)
Dept: LAB | Facility: HOSPITAL | Age: 74
End: 2024-02-05
Payer: MEDICARE

## 2024-02-05 VITALS
WEIGHT: 184 LBS | BODY MASS INDEX: 27.89 KG/M2 | TEMPERATURE: 97.8 F | OXYGEN SATURATION: 99 % | HEIGHT: 68 IN | DIASTOLIC BLOOD PRESSURE: 70 MMHG | HEART RATE: 71 BPM | SYSTOLIC BLOOD PRESSURE: 150 MMHG

## 2024-02-05 DIAGNOSIS — E55.9 VITAMIN D DEFICIENCY: Chronic | ICD-10-CM

## 2024-02-05 DIAGNOSIS — E11.65 TYPE 2 DIABETES MELLITUS WITH HYPERGLYCEMIA, WITHOUT LONG-TERM CURRENT USE OF INSULIN: Chronic | ICD-10-CM

## 2024-02-05 DIAGNOSIS — N40.0 BENIGN PROSTATIC HYPERPLASIA WITHOUT LOWER URINARY TRACT SYMPTOMS: Chronic | ICD-10-CM

## 2024-02-05 DIAGNOSIS — G89.4 CHRONIC PAIN SYNDROME: Chronic | ICD-10-CM

## 2024-02-05 DIAGNOSIS — I10 PRIMARY HYPERTENSION: Chronic | ICD-10-CM

## 2024-02-05 DIAGNOSIS — E83.52 HYPERCALCEMIA: ICD-10-CM

## 2024-02-05 DIAGNOSIS — E78.2 MIXED HYPERLIPIDEMIA: Chronic | ICD-10-CM

## 2024-02-05 DIAGNOSIS — I25.10 CHRONIC CORONARY ARTERY DISEASE: Chronic | ICD-10-CM

## 2024-02-05 DIAGNOSIS — E53.8 B12 DEFICIENCY: Chronic | ICD-10-CM

## 2024-02-05 LAB
25(OH)D3 SERPL-MCNC: 30.4 NG/ML (ref 30–100)
ALBUMIN SERPL-MCNC: 4.6 G/DL (ref 3.5–5.2)
ALBUMIN UR-MCNC: 14.6 MG/DL
ALBUMIN/GLOB SERPL: 1.4 G/DL
ALP SERPL-CCNC: 118 U/L (ref 39–117)
ALT SERPL W P-5'-P-CCNC: 28 U/L (ref 1–41)
ANION GAP SERPL CALCULATED.3IONS-SCNC: 8 MMOL/L (ref 5–15)
AST SERPL-CCNC: 28 U/L (ref 1–40)
BILIRUB SERPL-MCNC: 0.4 MG/DL (ref 0–1.2)
BUN SERPL-MCNC: 20 MG/DL (ref 8–23)
BUN/CREAT SERPL: 17.2 (ref 7–25)
CALCIUM SPEC-SCNC: 11.1 MG/DL (ref 8.6–10.5)
CHLORIDE SERPL-SCNC: 103 MMOL/L (ref 98–107)
CHOLEST SERPL-MCNC: 148 MG/DL (ref 0–200)
CO2 SERPL-SCNC: 28 MMOL/L (ref 22–29)
CREAT SERPL-MCNC: 1.16 MG/DL (ref 0.76–1.27)
CREAT UR-MCNC: 53 MG/DL
EGFRCR SERPLBLD CKD-EPI 2021: 66.1 ML/MIN/1.73
GLOBULIN UR ELPH-MCNC: 3.3 GM/DL
GLUCOSE SERPL-MCNC: 171 MG/DL (ref 65–99)
HBA1C MFR BLD: 6.8 % (ref 4.8–5.6)
HDLC SERPL-MCNC: 59 MG/DL (ref 40–60)
LDLC SERPL CALC-MCNC: 70 MG/DL (ref 0–100)
LDLC/HDLC SERPL: 1.14 {RATIO}
MICROALBUMIN/CREAT UR: 275.5 MG/G (ref 0–29)
POTASSIUM SERPL-SCNC: 4.6 MMOL/L (ref 3.5–5.2)
PROT SERPL-MCNC: 7.9 G/DL (ref 6–8.5)
SODIUM SERPL-SCNC: 139 MMOL/L (ref 136–145)
TRIGL SERPL-MCNC: 108 MG/DL (ref 0–150)
VIT B12 BLD-MCNC: 1489 PG/ML (ref 211–946)
VLDLC SERPL-MCNC: 19 MG/DL (ref 5–40)

## 2024-02-05 PROCEDURE — 80061 LIPID PANEL: CPT

## 2024-02-05 PROCEDURE — 82570 ASSAY OF URINE CREATININE: CPT

## 2024-02-05 PROCEDURE — 99214 OFFICE O/P EST MOD 30 MIN: CPT | Performed by: NURSE PRACTITIONER

## 2024-02-05 PROCEDURE — 82306 VITAMIN D 25 HYDROXY: CPT

## 2024-02-05 PROCEDURE — 1160F RVW MEDS BY RX/DR IN RCRD: CPT | Performed by: NURSE PRACTITIONER

## 2024-02-05 PROCEDURE — 3078F DIAST BP <80 MM HG: CPT | Performed by: NURSE PRACTITIONER

## 2024-02-05 PROCEDURE — 3077F SYST BP >= 140 MM HG: CPT | Performed by: NURSE PRACTITIONER

## 2024-02-05 PROCEDURE — 83036 HEMOGLOBIN GLYCOSYLATED A1C: CPT

## 2024-02-05 PROCEDURE — 36415 COLL VENOUS BLD VENIPUNCTURE: CPT | Performed by: NURSE PRACTITIONER

## 2024-02-05 PROCEDURE — 82607 VITAMIN B-12: CPT | Performed by: NURSE PRACTITIONER

## 2024-02-05 PROCEDURE — 1159F MED LIST DOCD IN RCRD: CPT | Performed by: NURSE PRACTITIONER

## 2024-02-05 PROCEDURE — 80053 COMPREHEN METABOLIC PANEL: CPT

## 2024-02-05 PROCEDURE — 82043 UR ALBUMIN QUANTITATIVE: CPT

## 2024-02-05 PROCEDURE — 84100 ASSAY OF PHOSPHORUS: CPT

## 2024-02-05 NOTE — PATIENT INSTRUCTIONS
Follow up on labs  Feet are dry. Need to lotion them monitor for wounds don't pick around nails.  Eat healthy exercise daily./

## 2024-02-05 NOTE — PROGRESS NOTES
Subjective        Merlin Trujillo is a 74 y.o. male.     Chief Complaint   Patient presents with    Hypertension     6 month f/u       Hypertension      Patient is here for management of his chronic medical problems: Diabetes, vit d def, hypertension, hyperlipidemia, chronic pain. BPH, CAD, b12 def,     DM glipizide 10 mg bid, metformin 500 mg bid denies lows. No recent labs.  A1C last check 6.7 . Ususally runs 140.     Hypertension: atenolol 50 mg 1/2 tablet bid, amlodipine 5 mg daily.  Has DM and CAD.     CAD followed by cardiology; taking aspirin 81 mg daily. Atorvastatin and atenolol. Has bilateral carotid stenosis and partial retinal artery occlusion. PAD,     Hyperlipidemia: atorvastatin 40 mg daily has CAD and hypertension. Is DM controlled.  1/5/2023 tC 145 tri 108 hdl 53 ldl 72 all normal.     Chronic pain followed by pain management on gabapentin 300 mg at night and 600 mg tid. Prescribed by pain management. Morphine 15 mg tid prn. No helping as much but is willing stay on same dose.    Constipation amitiza daily 24 mg related to chronic pain.     Vit d def 400 units daily also taking multi vit            The following portions of the patient's history were reviewed and updated as appropriate: allergies, current medications, past family history, past medical history, past social history, past surgical history and problem list.      Current Outpatient Medications:     Accu-Chek FastClix Lancets misc, Test blood sugars once day, Disp: 102 each, Rfl: 0    amLODIPine (NORVASC) 5 MG tablet, TAKE ONE TABLET BY MOUTH TWICE DAILY, Disp: 180 tablet, Rfl: 0    aspirin 81 MG EC tablet, Take 1 tablet by mouth Daily., Disp: , Rfl:     atenolol (TENORMIN) 50 MG tablet, TAKE 1/2 TABLET BY MOUTH TWICE DAILY, Disp: 90 tablet, Rfl: 0    atorvastatin (LIPITOR) 40 MG tablet, TAKE ONE TABLET BY MOUTH EVERY NIGHT AT BEDTIME, Disp: 90 tablet, Rfl: 0    baclofen (LIORESAL) 10 MG tablet, TAKE ONE TABLET BY MOUTH THREE TIMES  "DAILY, Disp: 270 tablet, Rfl: 0    cholecalciferol (VITAMIN D3) 10 MCG (400 UNIT) tablet, Take 1 tablet by mouth Daily. 1000 units, Disp: , Rfl:     cyanocobalamin 1000 MCG/ML injection, INJECT 1ML INTO THE APPROPRIATE MUSCLE AS DIRECTED BY PRESCRIBER ONE TIME FOR ONE DOSE, Disp: 1 mL, Rfl: 3    gabapentin (NEURONTIN) 300 MG capsule, Take 1 capsule by mouth Every Night., Disp: 90 capsule, Rfl: 3    gabapentin (NEURONTIN) 600 MG tablet, Take 1 tablet by mouth 3 (Three) Times a Day., Disp: 90 tablet, Rfl: 5    glipizide (GLUCOTROL) 10 MG tablet, TAKE ONE TABLET BY MOUTH TWICE DAILY BEFORE MEALS, Disp: 180 tablet, Rfl: 0    glucose blood (Accu-Chek Guide) test strip, Test blood sugars once a day., Disp: 100 each, Rfl: 3    lubiprostone (AMITIZA) 24 MCG capsule, TAKE 1 CAPSULE BY MOUTH TWICE DAILY, Disp: 180 capsule, Rfl: 2    metFORMIN (GLUCOPHAGE) 500 MG tablet, TAKE 1 TABLET BY MOUTH 2 TIMES A DAY WITH MEALS, Disp: 180 tablet, Rfl: 0    Morphine (MS CONTIN) 15 MG 12 hr tablet, Take 1 tablet by mouth 3 (Three) Times a Day., Disp: 90 tablet, Rfl: 0    Morphine (MS CONTIN) 15 MG 12 hr tablet, Take 1 tablet by mouth 3 (Three) Times a Day As Needed for Severe Pain. DNF before 1/22/2024, Disp: 90 tablet, Rfl: 0    multivitamin (THERAGRAN) tablet tablet, Take  by mouth Daily. Calcium+magnesium+zinc \"Sometimes\", Disp: , Rfl:     pentoxifylline (TRENtal) 400 MG CR tablet, TAKE ONE TABLET BY MOUTH THREE TIMES DAILY, Disp: 270 tablet, Rfl: 2    Syringe/Needle, Disp, (B-D 3CC LUER-RONY SYR 25GX1\") 25G X 1\" 3 ML misc, USE AS DIRECTED WITH B-12 INJECTION, Disp: 12 each, Rfl: 2    Recent Results (from the past 4032 hour(s))   Duplex Carotid Ultrasound CAR    Collection Time: 12/15/23 11:06 AM   Result Value Ref Range    Prox CCA PSV 60.3 cm/sec    Prox CCA EDV 14.3 cm/sec    Dist CCA PSV 94.4 cm/sec    Dist CCA EDV 16.2 cm/sec    Prox ICA PSV -120.0 cm/sec    Prox ICA EDV -24.3 cm/sec    Dist ICA PSV -72.1 cm/sec    Dist ICA EDV " "-20.5 cm/sec    Prox ECA PSV -101.0 cm/sec    Vertebral A PSV -48.5 cm/sec    Vertebral A EDV -14.3 cm/sec    Prox SCLA .0 cm/sec    ICA/CCA ratio -1.27     Prox CCA PSV -78.3 cm/sec    Prox CCA EDV -14.3 cm/sec    Dist CCA PSV -65.2 cm/sec    Dist CCA EDV -18.6 cm/sec    Prox ICA PSV -116.0 cm/sec    Prox ICA EDV -32.1 cm/sec    Dist ICA PSV -104.0 cm/sec    Dist ICA EDV -32.1 cm/sec    Prox ECA PSV -122.0 cm/sec    Vertebral A PSV -61.5 cm/sec    Vertebral A EDV -17.4 cm/sec    Prox SCLA .0 cm/sec    ICA/CCA ratio 1.48     Right arm /93 mmHg    Left arm /77 mmHg         Review of Systems    Objective     /70   Pulse 71   Temp 97.8 °F (36.6 °C) (Infrared)   Ht 172.7 cm (68\")   Wt 83.5 kg (184 lb)   SpO2 99%   BMI 27.98 kg/m²     Physical Exam  Vitals and nursing note reviewed.   Constitutional:       Appearance: He is obese.   HENT:      Head: Normocephalic.      Right Ear: External ear normal.      Left Ear: External ear normal.      Nose: Nose normal.      Mouth/Throat:      Mouth: Mucous membranes are moist.   Eyes:      Pupils: Pupils are equal, round, and reactive to light.   Neck:      Vascular: Carotid bruit present.   Cardiovascular:      Rate and Rhythm: Normal rate and regular rhythm.      Pulses: Normal pulses.           Dorsalis pedis pulses are 2+ on the right side and 2+ on the left side.        Posterior tibial pulses are 2+ on the right side and 2+ on the left side.      Heart sounds: Murmur heard.      Comments: Holosystolic murmur.   Pulmonary:      Effort: Pulmonary effort is normal.      Breath sounds: Normal breath sounds.   Abdominal:      General: Bowel sounds are normal.      Palpations: Abdomen is soft.   Musculoskeletal:      Right lower leg: No edema.      Left lower leg: No edema.      Right foot: Normal range of motion.      Left foot: Normal range of motion.   Feet:      Right foot:      Protective Sensation: 10 sites tested.  10 sites sensed.      " Skin integrity: Dry skin present.      Toenail Condition: Right toenails are abnormally thick.      Left foot:      Protective Sensation: 10 sites tested.  10 sites sensed.      Skin integrity: Dry skin present.      Toenail Condition: Left toenails are abnormally thick.   Skin:     General: Skin is warm.   Neurological:      General: No focal deficit present.      Mental Status: He is alert and oriented to person, place, and time.   Psychiatric:         Mood and Affect: Mood normal.         Thought Content: Thought content normal.         Result Review :                Assessment & Plan    Diagnoses and all orders for this visit:    1. Chronic coronary artery disease  Comments:  stable  Orders:  -     Lipid Panel; Future  -     Microalbumin / Creatinine Urine Ratio - Urine, Clean Catch; Future  -     Comprehensive Metabolic Panel; Future  -     Hemoglobin A1c; Future    2. Mixed hyperlipidemia  Comments:  labs ordered  Orders:  -     Lipid Panel; Future  -     Comprehensive Metabolic Panel; Future    3. Primary hypertension  Comments:  stable  Orders:  -     Lipid Panel; Future  -     Comprehensive Metabolic Panel; Future    4. B12 deficiency  Comments:  labs ordered  Orders:  -     Vitamin B12    5. Type 2 diabetes mellitus with hyperglycemia, without long-term current use of insulin  Comments:  monitor blood sugar  Orders:  -     Lipid Panel; Future  -     Microalbumin / Creatinine Urine Ratio - Urine, Clean Catch; Future  -     Comprehensive Metabolic Panel; Future  -     Hemoglobin A1c; Future    6. Vitamin D deficiency  Comments:  labs ordered  Orders:  -     Vitamin D,25-Hydroxy; Future    7. Benign prostatic hyperplasia without lower urinary tract symptoms  Comments:  stable    8. Chronic pain syndrome  Comments:  followed by pain management      Patient Instructions   Follow up on labs  Feet are dry. Need to lotion them monitor for wounds don't pick around nails.  Eat healthy exercise daily./     Follow Up    Return in about 6 months (around 8/5/2024).    Patient was given instructions and counseling regarding his condition or for health maintenance advice. Please see specific information pulled into the AVS if appropriate.     Court Anderson, APRN    02/05/24

## 2024-02-06 DIAGNOSIS — E83.52 HYPERCALCEMIA: Primary | ICD-10-CM

## 2024-02-06 LAB — PHOSPHATE SERPL-MCNC: 3.7 MG/DL (ref 2.5–4.5)

## 2024-02-09 DIAGNOSIS — E83.52 HYPERCALCEMIA: Primary | ICD-10-CM

## 2024-02-09 DIAGNOSIS — R80.9 MICROALBUMINURIA: ICD-10-CM

## 2024-02-09 DIAGNOSIS — E11.65 TYPE 2 DIABETES MELLITUS WITH HYPERGLYCEMIA, WITHOUT LONG-TERM CURRENT USE OF INSULIN: ICD-10-CM

## 2024-02-13 ENCOUNTER — OFFICE VISIT (OUTPATIENT)
Dept: PAIN MEDICINE | Facility: CLINIC | Age: 74
End: 2024-02-13
Payer: OTHER MISCELLANEOUS

## 2024-02-13 VITALS
DIASTOLIC BLOOD PRESSURE: 79 MMHG | WEIGHT: 186 LBS | SYSTOLIC BLOOD PRESSURE: 148 MMHG | OXYGEN SATURATION: 99 % | HEART RATE: 62 BPM | BODY MASS INDEX: 28.28 KG/M2 | RESPIRATION RATE: 16 BRPM

## 2024-02-13 DIAGNOSIS — M25.512 CHRONIC LEFT SHOULDER PAIN: ICD-10-CM

## 2024-02-13 DIAGNOSIS — G89.4 CHRONIC PAIN SYNDROME: Primary | ICD-10-CM

## 2024-02-13 DIAGNOSIS — M47.817 LUMBOSACRAL SPONDYLOSIS WITHOUT MYELOPATHY: ICD-10-CM

## 2024-02-13 DIAGNOSIS — M79.2 NEUROPATHIC PAIN: ICD-10-CM

## 2024-02-13 DIAGNOSIS — G89.29 CHRONIC LEFT SHOULDER PAIN: ICD-10-CM

## 2024-02-13 DIAGNOSIS — M47.812 CERVICAL SPONDYLOSIS WITHOUT MYELOPATHY: ICD-10-CM

## 2024-02-13 DIAGNOSIS — Z79.899 HIGH RISK MEDICATION USE: ICD-10-CM

## 2024-02-13 RX ORDER — MORPHINE SULFATE 15 MG/1
15 TABLET, FILM COATED, EXTENDED RELEASE ORAL 3 TIMES DAILY
Qty: 90 TABLET | Refills: 0 | Status: SHIPPED | OUTPATIENT
Start: 2024-02-22

## 2024-02-13 RX ORDER — MORPHINE SULFATE 15 MG/1
15 TABLET, FILM COATED, EXTENDED RELEASE ORAL 3 TIMES DAILY PRN
Qty: 90 TABLET | Refills: 0 | Status: SHIPPED | OUTPATIENT
Start: 2024-03-21

## 2024-02-13 RX ORDER — GABAPENTIN 300 MG/1
300 CAPSULE ORAL NIGHTLY
Qty: 90 CAPSULE | Refills: 3 | Status: SHIPPED | OUTPATIENT
Start: 2024-02-13

## 2024-02-14 RX ORDER — BACLOFEN 10 MG/1
10 TABLET ORAL 3 TIMES DAILY
Qty: 270 TABLET | Refills: 0 | Status: SHIPPED | OUTPATIENT
Start: 2024-02-14

## 2024-03-13 RX ORDER — ATORVASTATIN CALCIUM 40 MG/1
TABLET, FILM COATED ORAL
Qty: 90 TABLET | Refills: 0 | Status: SHIPPED | OUTPATIENT
Start: 2024-03-13

## 2024-03-13 RX ORDER — AMLODIPINE BESYLATE 5 MG/1
TABLET ORAL
Qty: 180 TABLET | Refills: 0 | Status: SHIPPED | OUTPATIENT
Start: 2024-03-13

## 2024-03-13 RX ORDER — ATENOLOL 50 MG/1
TABLET ORAL
Qty: 90 TABLET | Refills: 0 | Status: SHIPPED | OUTPATIENT
Start: 2024-03-13

## 2024-04-15 RX ORDER — PENTOXIFYLLINE 400 MG/1
TABLET, EXTENDED RELEASE ORAL
Qty: 270 TABLET | Refills: 1 | Status: SHIPPED | OUTPATIENT
Start: 2024-04-15

## 2024-04-15 RX ORDER — GLIPIZIDE 10 MG/1
TABLET ORAL
Qty: 180 TABLET | Refills: 0 | Status: SHIPPED | OUTPATIENT
Start: 2024-04-15

## 2024-04-18 ENCOUNTER — OFFICE VISIT (OUTPATIENT)
Dept: PAIN MEDICINE | Facility: CLINIC | Age: 74
End: 2024-04-18
Payer: MEDICARE

## 2024-04-18 VITALS
RESPIRATION RATE: 16 BRPM | DIASTOLIC BLOOD PRESSURE: 75 MMHG | SYSTOLIC BLOOD PRESSURE: 143 MMHG | WEIGHT: 184 LBS | HEART RATE: 64 BPM | BODY MASS INDEX: 27.98 KG/M2 | OXYGEN SATURATION: 95 %

## 2024-04-18 DIAGNOSIS — M47.817 LUMBOSACRAL SPONDYLOSIS WITHOUT MYELOPATHY: ICD-10-CM

## 2024-04-18 DIAGNOSIS — M47.812 CERVICAL SPONDYLOSIS WITHOUT MYELOPATHY: ICD-10-CM

## 2024-04-18 DIAGNOSIS — G89.4 CHRONIC PAIN SYNDROME: ICD-10-CM

## 2024-04-18 DIAGNOSIS — M79.2 NEUROPATHIC PAIN: ICD-10-CM

## 2024-04-18 DIAGNOSIS — G89.29 CHRONIC LEFT SHOULDER PAIN: ICD-10-CM

## 2024-04-18 DIAGNOSIS — Z79.899 HIGH RISK MEDICATION USE: Primary | ICD-10-CM

## 2024-04-18 DIAGNOSIS — M25.512 CHRONIC LEFT SHOULDER PAIN: ICD-10-CM

## 2024-04-18 RX ORDER — GABAPENTIN 600 MG/1
600 TABLET ORAL 4 TIMES DAILY PRN
Qty: 120 TABLET | Refills: 5 | Status: SHIPPED | OUTPATIENT
Start: 2024-04-18

## 2024-04-18 RX ORDER — MORPHINE SULFATE 15 MG/1
15 TABLET, FILM COATED, EXTENDED RELEASE ORAL 3 TIMES DAILY
Qty: 90 TABLET | Refills: 0 | Status: SHIPPED | OUTPATIENT
Start: 2024-04-18

## 2024-04-18 RX ORDER — MORPHINE SULFATE 15 MG/1
15 TABLET, FILM COATED, EXTENDED RELEASE ORAL 3 TIMES DAILY PRN
Qty: 90 TABLET | Refills: 0 | Status: SHIPPED | OUTPATIENT
Start: 2024-05-20
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
## 2024-05-13 RX ORDER — LUBIPROSTONE 24 UG/1
CAPSULE ORAL
Qty: 180 CAPSULE | Refills: 1 | Status: SHIPPED | OUTPATIENT
Start: 2024-05-13

## 2024-05-14 RX ORDER — BACLOFEN 10 MG/1
10 TABLET ORAL 3 TIMES DAILY
Qty: 270 TABLET | Refills: 0 | Status: SHIPPED | OUTPATIENT
Start: 2024-05-14

## 2024-06-11 RX ORDER — AMLODIPINE BESYLATE 5 MG/1
TABLET ORAL
Qty: 180 TABLET | Refills: 0 | Status: SHIPPED | OUTPATIENT
Start: 2024-06-11

## 2024-06-11 RX ORDER — ATORVASTATIN CALCIUM 40 MG/1
TABLET, FILM COATED ORAL
Qty: 90 TABLET | Refills: 0 | Status: SHIPPED | OUTPATIENT
Start: 2024-06-11

## 2024-06-11 RX ORDER — CYANOCOBALAMIN 1000 UG/ML
INJECTION, SOLUTION INTRAMUSCULAR; SUBCUTANEOUS
Qty: 1 ML | Refills: 2 | Status: SHIPPED | OUTPATIENT
Start: 2024-06-11

## 2024-06-11 RX ORDER — ATENOLOL 50 MG/1
TABLET ORAL
Qty: 90 TABLET | Refills: 0 | Status: SHIPPED | OUTPATIENT
Start: 2024-06-11

## 2024-06-13 ENCOUNTER — OFFICE VISIT (OUTPATIENT)
Dept: PAIN MEDICINE | Facility: CLINIC | Age: 74
End: 2024-06-13
Payer: MEDICARE

## 2024-06-13 VITALS
BODY MASS INDEX: 27.22 KG/M2 | OXYGEN SATURATION: 98 % | RESPIRATION RATE: 16 BRPM | WEIGHT: 179 LBS | DIASTOLIC BLOOD PRESSURE: 83 MMHG | HEART RATE: 61 BPM | SYSTOLIC BLOOD PRESSURE: 134 MMHG

## 2024-06-13 DIAGNOSIS — M79.2 NEUROPATHIC PAIN: ICD-10-CM

## 2024-06-13 DIAGNOSIS — M47.812 CERVICAL SPONDYLOSIS WITHOUT MYELOPATHY: ICD-10-CM

## 2024-06-13 DIAGNOSIS — G89.29 CHRONIC LEFT SHOULDER PAIN: ICD-10-CM

## 2024-06-13 DIAGNOSIS — G89.4 CHRONIC PAIN SYNDROME: Primary | ICD-10-CM

## 2024-06-13 DIAGNOSIS — Z79.899 HIGH RISK MEDICATION USE: ICD-10-CM

## 2024-06-13 DIAGNOSIS — M47.817 LUMBOSACRAL SPONDYLOSIS WITHOUT MYELOPATHY: ICD-10-CM

## 2024-06-13 DIAGNOSIS — M25.512 CHRONIC LEFT SHOULDER PAIN: ICD-10-CM

## 2024-06-13 RX ORDER — MORPHINE SULFATE 15 MG/1
15 TABLET, FILM COATED, EXTENDED RELEASE ORAL 3 TIMES DAILY
Qty: 90 TABLET | Refills: 0 | Status: SHIPPED | OUTPATIENT
Start: 2024-06-19

## 2024-06-13 RX ORDER — MORPHINE SULFATE 15 MG/1
15 TABLET, FILM COATED, EXTENDED RELEASE ORAL 3 TIMES DAILY PRN
Qty: 90 TABLET | Refills: 0 | Status: SHIPPED | OUTPATIENT
Start: 2024-07-19
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
## 2024-06-19 ENCOUNTER — OFFICE VISIT (OUTPATIENT)
Dept: CARDIOLOGY | Facility: CLINIC | Age: 74
End: 2024-06-19
Payer: MEDICARE

## 2024-06-19 VITALS
HEIGHT: 68 IN | BODY MASS INDEX: 26.52 KG/M2 | WEIGHT: 175 LBS | OXYGEN SATURATION: 96 % | DIASTOLIC BLOOD PRESSURE: 75 MMHG | SYSTOLIC BLOOD PRESSURE: 143 MMHG | HEART RATE: 53 BPM

## 2024-06-19 DIAGNOSIS — I10 PRIMARY HYPERTENSION: ICD-10-CM

## 2024-06-19 DIAGNOSIS — I25.10 CHRONIC CORONARY ARTERY DISEASE: Primary | ICD-10-CM

## 2024-06-19 DIAGNOSIS — I65.23 BILATERAL CAROTID ARTERY STENOSIS: ICD-10-CM

## 2024-06-19 DIAGNOSIS — E78.2 MIXED HYPERLIPIDEMIA: ICD-10-CM

## 2024-06-19 PROCEDURE — 1160F RVW MEDS BY RX/DR IN RCRD: CPT | Performed by: INTERNAL MEDICINE

## 2024-06-19 PROCEDURE — 3078F DIAST BP <80 MM HG: CPT | Performed by: INTERNAL MEDICINE

## 2024-06-19 PROCEDURE — 99214 OFFICE O/P EST MOD 30 MIN: CPT | Performed by: INTERNAL MEDICINE

## 2024-06-19 PROCEDURE — 93000 ELECTROCARDIOGRAM COMPLETE: CPT | Performed by: INTERNAL MEDICINE

## 2024-06-19 PROCEDURE — 3077F SYST BP >= 140 MM HG: CPT | Performed by: INTERNAL MEDICINE

## 2024-06-19 PROCEDURE — 1159F MED LIST DOCD IN RCRD: CPT | Performed by: INTERNAL MEDICINE

## 2024-06-19 NOTE — PROGRESS NOTES
Cardiology Office Visit      Encounter Date:  06/19/2024    Patient ID:   Merlin Trujillo is a 74 y.o. male.    Reason For Followup:  Peripheral artery disease  Carotid stenosis  Hypertension  Hyperlipidemia    Brief Clinical History:  Dear Dr. Lyell, Reggie Duane, MD    I had the pleasure of seeing Merlin Trujillo today. As you are well aware, this is a 74 y.o. male with past medical history that is significant for history of hypertension hyperlipidemia  and peripheral arterial disease here for follow-up  Patient had symptoms of TIA with amaurosis fugax in the right eye.  Noted to have significant carotid stenosis in the right carotid artery  Status post a successful right carotid endarterectomy  Patient had recent hospitalization with urosepsis  Echocardiogram showed normal LV systolic function        Interval History:  Denies any chest pain  Blood pressure somewhat elevated in the office but patient states his home blood pressure readings are optimal  New cardiac symptoms  Denies any dizziness or syncope    Interpretation Summary    Myocardial perfusion imaging indicates a normal myocardial perfusion study with no evidence of ischemia.  Left ventricular ejection fraction is hyperdynamic (Calculated EF > 70%). .  Findings consistent with a normal ECG stress test.  Impressions are consistent with a low risk study.  There is no prior study available for comparison.    1. The patient has a history of prior right carotid surgery a few years ago. The degree of narrowing is less than 50% by consensus panel criteria in the right common carotid artery carotid bifurcation.  2. Mild to moderate plaque deposition left carotid bifurcation. The degree of narrowing is less than 50% by consensus panel criteria.  3. Patent vertebral arteries bilaterally with antegrade flow.       Assessment & Plan    Impressions:     Hypertension   hyperlipidemia   peripheral arterial disease   presumed coronary artery disease but no evidence  "of any inducible ischemia on the stress test  Bilateral carotid stenosis less than 50% on carotid ultrasound that was done in March 2021  Stress test in May 2021 with normal LV systolic function normal wall motion estimated LV ejection fraction of 70% with no inducible ischemia  Chronic renal insufficiency with improvement in the creatinine numbers compared to baseline  [place] hypertension    Recommendations:  Recent myocardial perfusion study that was normal   continued aggressive risk factor modification   regular exercise   labs with primary care physician office  Labs discussed with the patient  Recent labs reviewed and discussed with the patient  Medications reviewed and discussed with patient  Labs and medications reviewed and discussed the patient  Lipids from March reviewed and discussed with patient lipids are optimal including LDL cholesterol less than 70  Continue current medical therapy with aspirin 81 mg p.o. once a day atenolol 25 mg p.o. twice daily Lipitor 40 mg p.o. once a day  Close monitoring of blood pressure at home  Recent carotid ultrasound study from December 2023 with normal postoperative findings involving the right side and no significant stenosis involving the left internal carotid artery  Consider repeat carotid ultrasound study next year  Home blood pressure monitoring  Recent labs reviewed and discussed with patient  Close monitoring of renal function with primary care physician office  Follow up in office in 6 months        Vitals:  Vitals:    06/19/24 1429   BP: 143/75   Pulse: 53   SpO2: 96%   Weight: 79.4 kg (175 lb)   Height: 172.7 cm (68\")       Physical Exam:    General: Alert, cooperative, no distress, appears stated age  Head:  Normocephalic, atraumatic, mucous membranes moist  Eyes:  Conjunctiva/corneas clear, EOM's intact     Neck:  Supple,  no adenopathy;      Lungs: Clear to auscultation bilaterally, no wheezes rhonchi rales are noted  Chest wall: No " tenderness  Heart::  Regular rate and rhythm, S1 and S2 normal, no murmur, rub or gallop  Abdomen: Soft, non-tender, nondistended bowel sounds active  Extremities: No cyanosis, clubbing, or edema  Pulses: 2+ and symmetric all extremities  Skin:  No rashes or lesions  Neuro/psych: A&O x3. CN II through XII are grossly intact with appropriate affect              Lab Results   Component Value Date    GLUCOSE 171 (H) 02/05/2024    BUN 20 02/05/2024    CREATININE 1.16 02/05/2024    EGFRRESULT 58 (L) 03/03/2022    EGFR 66.1 02/05/2024    BCR 17.2 02/05/2024    K 4.6 02/05/2024    CO2 28.0 02/05/2024    CALCIUM 11.1 (H) 02/05/2024    PROTENTOTREF 7.8 03/03/2022    ALBUMIN 4.6 02/05/2024    BILITOT 0.4 02/05/2024    AST 28 02/05/2024    ALT 28 02/05/2024        No results found for this or any previous visit.     Lab Results   Component Value Date    CHOL 148 02/05/2024    CHLPL 147 03/03/2022    TRIG 108 02/05/2024    HDL 59 02/05/2024    LDL 70 02/05/2024      Results for orders placed during the hospital encounter of 05/19/21    Stress Test With Myocardial Perfusion One Day    Interpretation Summary  · Myocardial perfusion imaging indicates a normal myocardial perfusion study with no evidence of ischemia.  · Left ventricular ejection fraction is hyperdynamic (Calculated EF > 70%). .  · Findings consistent with a normal ECG stress test.  · Impressions are consistent with a low risk study.  · There is no prior study available for comparison.   Results for orders placed in visit on 04/10/17    CARDIOVASCULAR STUDIES - CONVERTED    Narrative  Nuclear Stress Test Preliminary      Imported By: Daphne Bansal MA 4/10/2017 4:47:10 PM    _____________________________________________________________________    External Attachment:    Type: Image  Comment:  External Document      Electronically signed by Yury Iraheta MD on 04/11/2017 at 8:58  AM  ________________________________________________________________________      Disclaimer: Converted Note message may not contain all data elements that existed in the legacy source system. Please see [employer] for the original note details.           Objective:          Allergies:  No Known Allergies    Medication Review:     Current Outpatient Medications:     Accu-Chek FastClix Lancets misc, Test blood sugars once day, Disp: 102 each, Rfl: 0    amLODIPine (NORVASC) 5 MG tablet, TAKE ONE TABLET BY MOUTH TWICE DAILY, Disp: 180 tablet, Rfl: 0    aspirin 81 MG EC tablet, Take 1 tablet by mouth Daily., Disp: , Rfl:     atenolol (TENORMIN) 50 MG tablet, TAKE 1/2 TABLET BY MOUTH TWICE DAILY, Disp: 90 tablet, Rfl: 0    atorvastatin (LIPITOR) 40 MG tablet, TAKE ONE TABLET BY MOUTH EVERY NIGHT AT BEDTIME, Disp: 90 tablet, Rfl: 0    baclofen (LIORESAL) 10 MG tablet, TAKE ONE TABLET BY MOUTH THREE TIMES DAILY, Disp: 270 tablet, Rfl: 0    cholecalciferol (VITAMIN D3) 10 MCG (400 UNIT) tablet, Take 1 tablet by mouth Daily. 1000 units, Disp: , Rfl:     cyanocobalamin 1000 MCG/ML injection, INJECT 1ML INTO THE APPROPRIATE MUSCLE AS DIRECTED BY PRESCRIBER ONE TIME FOR ONE DOSE (NOT WANT 3/21/24), Disp: 1 mL, Rfl: 2    gabapentin (NEURONTIN) 600 MG tablet, Take 1 tablet by mouth 4 (Four) Times a Day As Needed (pain)., Disp: 120 tablet, Rfl: 5    glipizide (GLUCOTROL) 10 MG tablet, TAKE ONE TABLET BY MOUTH TWICE DAILY BEFORE MEALS, Disp: 180 tablet, Rfl: 0    glucose blood (Accu-Chek Guide) test strip, Test blood sugars once a day., Disp: 100 each, Rfl: 3    lubiprostone (AMITIZA) 24 MCG capsule, TAKE 1 CAPSULE BY MOUTH TWICE DAILY, Disp: 180 capsule, Rfl: 1    metFORMIN (GLUCOPHAGE) 500 MG tablet, TAKE 1 TABLET BY MOUTH 2 TIMES A DAY WITH MEALS, Disp: 180 tablet, Rfl: 0    Morphine (MS CONTIN) 15 MG 12 hr tablet, Take 1 tablet by mouth 3 (Three) Times a Day., Disp: 90 tablet, Rfl: 0    [START ON 7/19/2024]  "Morphine (MS CONTIN) 15 MG 12 hr tablet, Take 1 tablet by mouth 3 (Three) Times a Day As Needed for Severe Pain. DNF before 7/19/2024, Disp: 90 tablet, Rfl: 0    multivitamin (THERAGRAN) tablet tablet, Take  by mouth Daily. Calcium+magnesium+zinc \"Sometimes\", Disp: , Rfl:     pentoxifylline (TRENtal) 400 MG CR tablet, TAKE ONE TABLET BY MOUTH THREE TIMES DAILY, Disp: 270 tablet, Rfl: 1    Syringe/Needle, Disp, (B-D 3CC LUER-RONY SYR 25GX1\") 25G X 1\" 3 ML misc, USE AS DIRECTED WITH B-12 INJECTION, Disp: 12 each, Rfl: 2    Family History:  Family History   Problem Relation Age of Onset    Dementia Mother     Heart disease Mother     COPD Father     Stroke Father     Heart disease Father     Hypertension Sister     Diabetes Sister     Hypertension Brother        Past Medical History:  Past Medical History:   Diagnosis Date    Adenomatous polyps     Amaurosis fugax 3/27/2017    Arm pain     left into hand    Arm pain     Arm pain     left--- wk comp --injury 6/15/2015    Blood in urine     3/2020    BPH (benign prostatic hyperplasia)     CAD (coronary artery disease)     CKD (chronic kidney disease) stage 3, GFR 30-59 ml/min     CVA, old, speech/language deficit     Diabetes     Diabetic acetonemia     Hearing loss     Hematuria     Hernia, inguinal     Hyperlipidemia     Hypertension     Kidney stones     Low back pain     Neuropathic pain     Obstructive uropathy     Shoulder pain     Shoulder pain     Sleep disorder     Spondylosis, cervical     Urgency of urination        Past surgical History:  Past Surgical History:   Procedure Laterality Date    ANKLE SURGERY      replacement  rt    CAROTID ARTERY ANGIOPLASTY      CARPAL TUNNEL RELEASE      COLONOSCOPY      CYSTOSCOPY URETEROSCOPY LASER LITHOTRIPSY      kidney stones    EYE SURGERY      mesh  and plate under rt eye due to orbital fx    NOSE SURGERY      x2    OTHER SURGICAL HISTORY      uro  lift  1 /2021    TEETH EXTRACTION      dentures       Social " History:  Social History     Socioeconomic History    Marital status: Significant Other   Tobacco Use    Smoking status: Never     Passive exposure: Past    Smokeless tobacco: Never   Vaping Use    Vaping status: Never Used   Substance and Sexual Activity    Alcohol use: No    Drug use: Never    Sexual activity: Defer       Review of Systems:  The following systems were reviewed as they relate to the cardiovascular system: Constitutional, Eyes, ENT, Cardiovascular, Respiratory, Gastrointestinal, Integumentary, Neurological, Psychiatric, Hematologic, Endocrine, Musculoskeletal, and Genitourinary. The pertinent cardiovascular findings are reported above with all other cardiovascular points within those systems being negative.    Diagnostic Study Review:     Current Electrocardiogram:    ECG 12 Lead    Date/Time: 6/19/2024 3:50 PM  Performed by: Yury Iraheta MD    Authorized by: Yury Iraheta MD  Comparison: compared with previous ECG   Similar to previous ECG  Rhythm: sinus rhythm and sinus bradycardia  Rate: normal  BPM: 53  Conduction: conduction normal  ST Segments: ST segments normal  T Waves: T waves normal  QRS axis: normal  Other findings: non-specific ST-T wave changes    Clinical impression: abnormal EKG            Advance Care Planning   ACP discussion was held with the patient during this visit. Patient has an advance directive in EMR which is still valid.         NOTE: The following portions of the patient's history were reviewed and updated this visit as appropriate: allergies, current medications, past family history, past medical history, past social history, past surgical history and problem list.

## 2024-07-17 RX ORDER — CYANOCOBALAMIN 1000 UG/ML
INJECTION, SOLUTION INTRAMUSCULAR; SUBCUTANEOUS
Qty: 1 ML | Refills: 0 | Status: SHIPPED | OUTPATIENT
Start: 2024-07-17

## 2024-07-17 RX ORDER — GLIPIZIDE 10 MG/1
TABLET ORAL
Qty: 180 TABLET | Refills: 0 | Status: SHIPPED | OUTPATIENT
Start: 2024-07-17

## 2024-07-23 ENCOUNTER — OFFICE VISIT (OUTPATIENT)
Dept: FAMILY MEDICINE CLINIC | Facility: CLINIC | Age: 74
End: 2024-07-23
Payer: MEDICARE

## 2024-07-23 VITALS
HEIGHT: 68 IN | DIASTOLIC BLOOD PRESSURE: 71 MMHG | TEMPERATURE: 97.4 F | HEART RATE: 60 BPM | OXYGEN SATURATION: 100 % | RESPIRATION RATE: 16 BRPM | BODY MASS INDEX: 26.34 KG/M2 | SYSTOLIC BLOOD PRESSURE: 128 MMHG | WEIGHT: 173.8 LBS

## 2024-07-23 DIAGNOSIS — M47.817 LUMBOSACRAL SPONDYLOSIS WITHOUT MYELOPATHY: Primary | ICD-10-CM

## 2024-07-23 PROCEDURE — 3074F SYST BP LT 130 MM HG: CPT | Performed by: NURSE PRACTITIONER

## 2024-07-23 PROCEDURE — 3044F HG A1C LEVEL LT 7.0%: CPT | Performed by: NURSE PRACTITIONER

## 2024-07-23 PROCEDURE — 1125F AMNT PAIN NOTED PAIN PRSNT: CPT | Performed by: NURSE PRACTITIONER

## 2024-07-23 PROCEDURE — 3078F DIAST BP <80 MM HG: CPT | Performed by: NURSE PRACTITIONER

## 2024-07-23 PROCEDURE — 99213 OFFICE O/P EST LOW 20 MIN: CPT | Performed by: NURSE PRACTITIONER
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
## 2024-07-23 NOTE — PATIENT INSTRUCTIONS
Talk to pain management about the pain.  You should hear from Dr William regarding appointment with neurosurgery  You should hear from scheduling about MRI.

## 2024-08-13 RX ORDER — CYANOCOBALAMIN 1000 UG/ML
INJECTION, SOLUTION INTRAMUSCULAR; SUBCUTANEOUS
Qty: 1 ML | Refills: 0 | Status: SHIPPED | OUTPATIENT
Start: 2024-08-13

## 2024-08-13 RX ORDER — BACLOFEN 10 MG/1
10 TABLET ORAL 3 TIMES DAILY
Qty: 270 TABLET | Refills: 0 | Status: SHIPPED | OUTPATIENT
Start: 2024-08-13

## 2024-08-16 ENCOUNTER — HOSPITAL ENCOUNTER (OUTPATIENT)
Dept: MRI IMAGING | Facility: HOSPITAL | Age: 74
Discharge: HOME | End: 2024-08-16
Payer: MEDICARE

## 2024-08-16 DIAGNOSIS — M47.817 LUMBOSACRAL SPONDYLOSIS WITHOUT MYELOPATHY: ICD-10-CM

## 2024-08-16 LAB
CREAT BLDA-MCNC: 0.9 MG/DL (ref 0.6–1.3)
EGFRCR SERPLBLD CKD-EPI 2021: 89.6 ML/MIN/1.73

## 2024-08-16 PROCEDURE — 72158 MRI LUMBAR SPINE W/O & W/DYE: CPT

## 2024-08-16 PROCEDURE — 82565 ASSAY OF CREATININE: CPT

## 2024-08-16 PROCEDURE — A9579 GAD-BASE MR CONTRAST NOS,1ML: HCPCS | Performed by: NURSE PRACTITIONER

## 2024-08-16 PROCEDURE — 25010000002 GADOTERIDOL PER 1 ML: Performed by: NURSE PRACTITIONER

## 2024-08-16 RX ADMIN — GADOTERIDOL 20 ML: 279.3 INJECTION, SOLUTION INTRAVENOUS at 14:00

## 2024-08-19 ENCOUNTER — OFFICE VISIT (OUTPATIENT)
Dept: PAIN MEDICINE | Facility: CLINIC | Age: 74
End: 2024-08-19
Payer: MEDICARE

## 2024-08-19 VITALS
OXYGEN SATURATION: 95 % | DIASTOLIC BLOOD PRESSURE: 81 MMHG | WEIGHT: 185.1 LBS | BODY MASS INDEX: 28.14 KG/M2 | SYSTOLIC BLOOD PRESSURE: 145 MMHG | HEART RATE: 55 BPM | RESPIRATION RATE: 16 BRPM

## 2024-08-19 DIAGNOSIS — M79.2 NEUROPATHIC PAIN: ICD-10-CM

## 2024-08-19 DIAGNOSIS — Z79.899 HIGH RISK MEDICATION USE: ICD-10-CM

## 2024-08-19 DIAGNOSIS — Z79.899 HIGH RISK MEDICATION USE: Primary | ICD-10-CM

## 2024-08-19 DIAGNOSIS — M25.512 CHRONIC LEFT SHOULDER PAIN: ICD-10-CM

## 2024-08-19 DIAGNOSIS — G89.4 CHRONIC PAIN SYNDROME: Primary | ICD-10-CM

## 2024-08-19 DIAGNOSIS — G89.29 CHRONIC LEFT SHOULDER PAIN: ICD-10-CM

## 2024-08-19 DIAGNOSIS — M47.812 CERVICAL SPONDYLOSIS WITHOUT MYELOPATHY: ICD-10-CM

## 2024-08-19 DIAGNOSIS — M47.817 LUMBOSACRAL SPONDYLOSIS WITHOUT MYELOPATHY: ICD-10-CM

## 2024-08-19 PROCEDURE — 3079F DIAST BP 80-89 MM HG: CPT | Performed by: ANESTHESIOLOGY

## 2024-08-19 PROCEDURE — 1160F RVW MEDS BY RX/DR IN RCRD: CPT | Performed by: ANESTHESIOLOGY

## 2024-08-19 PROCEDURE — 1125F AMNT PAIN NOTED PAIN PRSNT: CPT | Performed by: ANESTHESIOLOGY

## 2024-08-19 PROCEDURE — 99214 OFFICE O/P EST MOD 30 MIN: CPT | Performed by: ANESTHESIOLOGY

## 2024-08-19 PROCEDURE — 3077F SYST BP >= 140 MM HG: CPT | Performed by: ANESTHESIOLOGY

## 2024-08-19 PROCEDURE — 1159F MED LIST DOCD IN RCRD: CPT | Performed by: ANESTHESIOLOGY

## 2024-08-19 RX ORDER — MORPHINE SULFATE 15 MG/1
15 TABLET, FILM COATED, EXTENDED RELEASE ORAL 3 TIMES DAILY PRN
Qty: 90 TABLET | Refills: 0 | Status: SHIPPED | OUTPATIENT
Start: 2024-08-22

## 2024-08-19 RX ORDER — MORPHINE SULFATE 15 MG/1
15 TABLET, FILM COATED, EXTENDED RELEASE ORAL 3 TIMES DAILY PRN
Qty: 90 TABLET | Refills: 0 | Status: SHIPPED | OUTPATIENT
Start: 2024-09-21
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
## 2024-08-20 ENCOUNTER — TRANSCRIBE ORDERS (OUTPATIENT)
Dept: PHYSICAL THERAPY | Facility: CLINIC | Age: 74
End: 2024-08-20
Payer: MEDICARE

## 2024-08-20 ENCOUNTER — TREATMENT (OUTPATIENT)
Dept: PHYSICAL THERAPY | Facility: CLINIC | Age: 74
End: 2024-08-20
Payer: MEDICARE

## 2024-08-20 DIAGNOSIS — M54.50 LOW BACK PAIN, UNSPECIFIED BACK PAIN LATERALITY, UNSPECIFIED CHRONICITY, UNSPECIFIED WHETHER SCIATICA PRESENT: Primary | ICD-10-CM

## 2024-08-20 PROCEDURE — 97110 THERAPEUTIC EXERCISES: CPT | Performed by: PHYSICAL THERAPIST

## 2024-08-20 PROCEDURE — 97530 THERAPEUTIC ACTIVITIES: CPT | Performed by: PHYSICAL THERAPIST

## 2024-08-20 PROCEDURE — 97162 PT EVAL MOD COMPLEX 30 MIN: CPT | Performed by: PHYSICAL THERAPIST
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
## 2024-08-22 ENCOUNTER — TREATMENT (OUTPATIENT)
Dept: PHYSICAL THERAPY | Facility: CLINIC | Age: 74
End: 2024-08-22
Payer: MEDICARE

## 2024-08-22 DIAGNOSIS — M54.50 LOW BACK PAIN, UNSPECIFIED BACK PAIN LATERALITY, UNSPECIFIED CHRONICITY, UNSPECIFIED WHETHER SCIATICA PRESENT: Primary | ICD-10-CM

## 2024-08-22 PROCEDURE — 97112 NEUROMUSCULAR REEDUCATION: CPT | Performed by: PHYSICAL THERAPIST

## 2024-08-22 PROCEDURE — 97110 THERAPEUTIC EXERCISES: CPT | Performed by: PHYSICAL THERAPIST

## 2024-08-22 PROCEDURE — 97116 GAIT TRAINING THERAPY: CPT | Performed by: PHYSICAL THERAPIST

## 2024-08-22 NOTE — PROGRESS NOTES
Physical Therapy Daily Treatment Note  [facility]  [address] [facility] [place] [address]     Patient: Merlin Trujillo   : 1950   Referring practitioner: Merlin William MD  Date of initial visit: Type: THERAPY  Noted: 2024   Today's date: 2024   Patient seen for 2 visits    Visit Diagnoses:    ICD-10-CM ICD-9-CM   1. Low back pain, unspecified back pain laterality, unspecified chronicity, unspecified whether sciatica present  M54.50 724.2       Subjective   Pt reports: 0/10 LBP today. No new complaints.       Objective     See Exercise, Manual, and Modality Logs for complete treatment.     Patient Education: HEP    Assessment/Plan Balance is limited, fatigued post visit.      Progress per Plan of Care            Timed:         Manual Therapy:         mins  83688;     Therapeutic Exercise:    10     mins  56010;     Neuromuscular Isaac:    10    mins  84766;    Therapeutic Activity:          mins  68582;     Gait Training:      15     mins  22964;     Ultrasound:          mins  53379;    Ionto                                   mins   51282  Self Care                            mins   27507    Un-Timed:  Electrical Stimulation:         mins  89661 ( );  Traction          mins 80821  Low Eval          Mins  41817  Mod Eval          Mins  67857  High Eval                            Mins  08114  Re-Eval                               mins  62134    Timed Treatment:   35   mins   Total Treatment:     35   mins      Anthony Rooney, PT, DPT, OCS  [place] license: [id number]  Physical Therapist

## 2024-08-26 ENCOUNTER — TREATMENT (OUTPATIENT)
Dept: PHYSICAL THERAPY | Facility: CLINIC | Age: 74
End: 2024-08-26
Payer: MEDICARE

## 2024-08-26 ENCOUNTER — OFFICE VISIT (OUTPATIENT)
Dept: FAMILY MEDICINE CLINIC | Facility: CLINIC | Age: 74
End: 2024-08-26
Payer: MEDICARE

## 2024-08-26 VITALS
TEMPERATURE: 98.2 F | SYSTOLIC BLOOD PRESSURE: 138 MMHG | HEIGHT: 68 IN | WEIGHT: 183 LBS | HEART RATE: 55 BPM | OXYGEN SATURATION: 98 % | RESPIRATION RATE: 17 BRPM | BODY MASS INDEX: 27.74 KG/M2 | DIASTOLIC BLOOD PRESSURE: 74 MMHG

## 2024-08-26 DIAGNOSIS — E53.8 B12 DEFICIENCY: ICD-10-CM

## 2024-08-26 DIAGNOSIS — M79.2 NEUROPATHIC PAIN: Chronic | ICD-10-CM

## 2024-08-26 DIAGNOSIS — I10 PRIMARY HYPERTENSION: Chronic | ICD-10-CM

## 2024-08-26 DIAGNOSIS — Z00.00 MEDICARE ANNUAL WELLNESS VISIT, SUBSEQUENT: Primary | ICD-10-CM

## 2024-08-26 DIAGNOSIS — E55.9 VITAMIN D DEFICIENCY: Chronic | ICD-10-CM

## 2024-08-26 DIAGNOSIS — I25.10 CHRONIC CORONARY ARTERY DISEASE: Chronic | ICD-10-CM

## 2024-08-26 DIAGNOSIS — R26.81 UNSTEADY GAIT: Chronic | ICD-10-CM

## 2024-08-26 DIAGNOSIS — M54.50 LOW BACK PAIN, UNSPECIFIED BACK PAIN LATERALITY, UNSPECIFIED CHRONICITY, UNSPECIFIED WHETHER SCIATICA PRESENT: Primary | ICD-10-CM

## 2024-08-26 DIAGNOSIS — G89.4 CHRONIC PAIN SYNDROME: Chronic | ICD-10-CM

## 2024-08-26 DIAGNOSIS — E11.65 TYPE 2 DIABETES MELLITUS WITH HYPERGLYCEMIA, WITHOUT LONG-TERM CURRENT USE OF INSULIN: Chronic | ICD-10-CM

## 2024-08-26 DIAGNOSIS — E78.2 MIXED HYPERLIPIDEMIA: Chronic | ICD-10-CM

## 2024-08-26 DIAGNOSIS — N18.31 STAGE 3A CHRONIC KIDNEY DISEASE: Chronic | ICD-10-CM

## 2024-08-26 PROCEDURE — 1159F MED LIST DOCD IN RCRD: CPT | Performed by: NURSE PRACTITIONER

## 2024-08-26 PROCEDURE — G0439 PPPS, SUBSEQ VISIT: HCPCS | Performed by: NURSE PRACTITIONER

## 2024-08-26 PROCEDURE — 97110 THERAPEUTIC EXERCISES: CPT | Performed by: PHYSICAL THERAPIST

## 2024-08-26 PROCEDURE — 1160F RVW MEDS BY RX/DR IN RCRD: CPT | Performed by: NURSE PRACTITIONER

## 2024-08-26 PROCEDURE — 3078F DIAST BP <80 MM HG: CPT | Performed by: NURSE PRACTITIONER

## 2024-08-26 PROCEDURE — 1170F FXNL STATUS ASSESSED: CPT | Performed by: NURSE PRACTITIONER

## 2024-08-26 PROCEDURE — 97112 NEUROMUSCULAR REEDUCATION: CPT | Performed by: PHYSICAL THERAPIST

## 2024-08-26 PROCEDURE — 3044F HG A1C LEVEL LT 7.0%: CPT | Performed by: NURSE PRACTITIONER

## 2024-08-26 PROCEDURE — 1125F AMNT PAIN NOTED PAIN PRSNT: CPT | Performed by: NURSE PRACTITIONER

## 2024-08-26 PROCEDURE — 3075F SYST BP GE 130 - 139MM HG: CPT | Performed by: NURSE PRACTITIONER

## 2024-08-26 PROCEDURE — 99214 OFFICE O/P EST MOD 30 MIN: CPT | Performed by: NURSE PRACTITIONER

## 2024-08-26 PROCEDURE — 97116 GAIT TRAINING THERAPY: CPT | Performed by: PHYSICAL THERAPIST

## 2024-08-26 NOTE — PROGRESS NOTES
Subjective   The ABCs of the Annual Wellness Visit  Medicare Wellness Visit      Merlin Trujillo is a 74 y.o. patient who presents for a Medicare Wellness Visit.    The following portions of the patient's history were reviewed and   updated as appropriate: allergies, current medications, past family history, past medical history, past social history, past surgical history, and problem list.    Compared to one year ago, the patient's physical   health is the same.  Compared to one year ago, the patient's mental   health is the same.    Recent Hospitalizations:  He was not admitted to the hospital during the last year.     Current Medical Providers:  Patient Care Team:  Court Anderson APRN as PCP - General (Nurse Practitioner)  Alice Bagley MD as Consulting Physician (Pain Medicine)  Yury Iraheta MD as Consulting Physician (Cardiology)  Joseph Abdi MD as Consulting Physician (Urology)  Tristen Jimenez MD as Consulting Physician (Nephrology)  Merlin William MD as Consulting Physician (Neurosurgery)    Outpatient Medications Prior to Visit   Medication Sig Dispense Refill    Accu-Chek FastClix Lancets misc Test blood sugars once day 102 each 0    amLODIPine (NORVASC) 5 MG tablet TAKE ONE TABLET BY MOUTH TWICE DAILY 180 tablet 0    aspirin 81 MG EC tablet Take 1 tablet by mouth Daily.      atenolol (TENORMIN) 50 MG tablet TAKE 1/2 TABLET BY MOUTH TWICE DAILY 90 tablet 0    atorvastatin (LIPITOR) 40 MG tablet TAKE ONE TABLET BY MOUTH EVERY NIGHT AT BEDTIME 90 tablet 0    baclofen (LIORESAL) 10 MG tablet TAKE ONE TABLET BY MOUTH THREE TIMES DAILY 270 tablet 0    cholecalciferol (VITAMIN D3) 10 MCG (400 UNIT) tablet Take 1 tablet by mouth Daily. 1000 units      cyanocobalamin 1000 MCG/ML injection INJECT 1ML INTO THE APPROPRIATE MUSCLE AS DIRECTED BY PRESCRIBER ONE TIME FOR ONE DOSE 1 mL 0    gabapentin (NEURONTIN) 600 MG tablet Take 1 tablet by mouth 4 (Four) Times a Day As Needed  "(pain). 120 tablet 5    glipizide (GLUCOTROL) 10 MG tablet TAKE ONE TABLET BY MOUTH TWICE DAILY BEFORE MEALS 180 tablet 0    glucose blood (Accu-Chek Guide) test strip Test blood sugars once a day. 100 each 3    lubiprostone (AMITIZA) 24 MCG capsule TAKE 1 CAPSULE BY MOUTH TWICE DAILY 180 capsule 1    metFORMIN (GLUCOPHAGE) 500 MG tablet TAKE 1 TABLET BY MOUTH 2 TIMES A DAY WITH MEALS 180 tablet 0    Morphine (MS CONTIN) 15 MG 12 hr tablet Take 1 tablet by mouth 3 (Three) Times a Day As Needed for Severe Pain. 90 tablet 0    [START ON 9/21/2024] Morphine (MS CONTIN) 15 MG 12 hr tablet Take 1 tablet by mouth 3 (Three) Times a Day As Needed for Severe Pain. DNF before 9/21/2024 90 tablet 0    multivitamin (THERAGRAN) tablet tablet Take  by mouth Daily. Calcium+magnesium+zinc  \"Sometimes\"      pentoxifylline (TRENtal) 400 MG CR tablet TAKE ONE TABLET BY MOUTH THREE TIMES DAILY 270 tablet 1    Syringe/Needle, Disp, (B-D 3CC LUER-RONY SYR 25GX1\") 25G X 1\" 3 ML misc USE AS DIRECTED WITH B-12 INJECTION 12 each 2     No facility-administered medications prior to visit.     Opioid medication/s are on active medication list.  and I have evaluated his active treatment plan and pain score trends (see table).  There were no vitals filed for this visit.  I have reviewed the chart for potential of high risk medication and harmful drug interactions in the elderly.        Aspirin is on active medication list. Aspirin use is indicated based on review of current medical condition/s. Pros and cons of this therapy have been discussed today. Benefits of this medication outweigh potential harm.  Patient has been encouraged to continue taking this medication.  .      Patient Active Problem List   Diagnosis    Neuropathic pain    Other long term (current) drug therapy    Abnormal results of kidney function studies    Adenomatous polyp of colon    Arthralgia of right ankle    Bilateral carotid artery stenosis    Cervical radiculitis    " "Chronic coronary artery disease    Chronic kidney disease, stage III (moderate)    Chronic pain    Edema leg    BPH (benign prostatic hyperplasia)    Epidermoid cyst of neck    Family history of diabetes mellitus    Family history of stroke    Fungal dermatitis    Hearing loss    Type 2 diabetes mellitus with hyperglycemia    Hyperlipidemia    Hypertension    Neck pain    Low back pain    Osteoarthritis of cervical 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    Shoulder pain    Sleep disorder    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  ACP discussion was held with the patient during this visit. Patient does not have an advance directive, information provided.            Objective   Vitals:    08/26/24 0918   BP: 138/74   BP Location: Left arm   Patient Position: Sitting   Cuff Size: Adult   Pulse: 55   Resp: 17   Temp: 98.2 °F (36.8 °C)   TempSrc: Oral   SpO2: 98%   Weight: 83 kg (183 lb)   Height: 172.7 cm (68\")       Estimated body mass index is 27.83 kg/m² as calculated from the following:    Height as of this encounter: 172.7 cm (68\").    Weight as of this encounter: 83 kg (183 lb).            Does the patient have evidence of cognitive impairment? No                                                                                                Health  Risk Assessment    Smoking Status:  Social History     Tobacco Use   Smoking Status " Never    Passive exposure: Past   Smokeless Tobacco Never     Alcohol Consumption:  Social History     Substance and Sexual Activity   Alcohol Use No       Fall Risk Screen  STEADI Fall Risk Assessment was completed, and patient is at LOW risk for falls.Assessment completed on:2024    Depression Screenin/26/2024     9:16 AM   PHQ-2/PHQ-9 Depression Screening   Little Interest or Pleasure in Doing Things 0-->not at all   Feeling Down, Depressed or Hopeless 0-->not at all   PHQ-9: Brief Depression Severity Measure Score 0     Health Habits and Functional and Cognitive Screenin/26/2024     9:17 AM   Functional & Cognitive Status   Do you have difficulty preparing food and eating? No   Do you have difficulty bathing yourself, getting dressed or grooming yourself? No   Do you have difficulty using the toilet? No   Do you have difficulty moving around from place to place? No   Do you have trouble with steps or getting out of a bed or a chair? No   Current Diet Well Balanced Diet   Dental Exam Not up to date   Eye Exam Up to date   Exercise (times per week) 2 times per week   Current Exercises Include Other   Do you need help using the phone?  No   Are you deaf or do you have serious difficulty hearing?  No   Do you need help to go to places out of walking distance? No   Do you need help shopping? No   Do you need help preparing meals?  No   Do you need help with housework?  No   Do you need help with laundry? No   Do you need help taking your medications? No   Do you need help managing money? No   Do you ever drive or ride in a car without wearing a seat belt? No   Have you felt unusual stress, anger or loneliness in the last month? No   Who do you live with? Other   If you need help, do you have trouble finding someone available to you? No   Have you been bothered in the last four weeks by sexual problems? No   Do you have difficulty concentrating, remembering or making decisions? No            Age-appropriate Screening Schedule:  Refer to the list below for future screening recommendations based on patient's age, sex and/or medical conditions. Orders for these recommended tests are listed in the plan section. The patient has been provided with a written plan.    Health Maintenance List  Health Maintenance   Topic Date Due    TDAP/TD VACCINES (1 - Tdap) Never done    DIABETIC EYE EXAM  02/25/2024    HEMOGLOBIN A1C  08/05/2024    INFLUENZA VACCINE  08/01/2024    COVID-19 Vaccine (4 - 2023-24 season) 02/05/2025 (Originally 9/1/2023)    DIABETIC FOOT EXAM  02/05/2025    LIPID PANEL  02/05/2025    URINE MICROALBUMIN  02/05/2025    BMI FOLLOWUP  08/12/2025    ANNUAL WELLNESS VISIT  08/26/2025    COLORECTAL CANCER SCREENING  01/03/2030    HEPATITIS C SCREENING  Completed    Pneumococcal Vaccine 65+  Completed    ZOSTER VACCINE  Completed                                                                                                                                                CMS Preventative Services Quick Reference  Risk Factors Identified During Encounter  Immunizations Discussed/Encouraged: Tdap    The above risks/problems have been discussed with the patient.  Pertinent information has been shared with the patient in the After Visit Summary.  An After Visit Summary and PPPS were made available to the patient.    Follow Up:   Next Medicare Wellness visit to be scheduled in 1 year.         Additional E&M Note during same encounter follows:  Patient has additional, significant, and separately identifiable condition(s)/problem(s) that require work above and beyond the Medicare Wellness Visit     Chief Complaint  Medicare Wellness-subsequent and Diabetes  C    Subjective   Diabetes  Associated symptoms include weakness.         Review of Systems   HENT:  Negative for dental problem.    Eyes:         Has appointment upcoming   Respiratory:  Negative for cough and wheezing.    Gastrointestinal:  Negative  "for abdominal pain, anal bleeding and blood in stool.   Musculoskeletal:  Positive for gait problem.        Balance disorder   Skin: Negative.    Allergic/Immunologic: Negative.    Neurological:  Positive for weakness.   Hematological: Negative.    Psychiatric/Behavioral:  Negative for dysphoric mood, hallucinations and suicidal ideas. The patient is not hyperactive.     Patient is here for management of his chronic medical problems:  Diabetes, chronic back pain, CAD, hypertension, hyperlipidemia,   B12 def, vit d def, CKD stae 3    Diabetes A1C 6.80 high metformin 500 mg bid glipizide 10 mg bid    Vit d def 30.4 normal on low end.     B12 def 1,489 high injection once month.     Neuropathy gabapentin 600mg times day , morphine baclofen 10 mg tid.     Hyperlipidemia atrovastatin 40 mg daily has Cad     Cad aspirin 81 m daily  has hypertension, and hyperlipidemia.     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 Prescribed in past post occupational injury.           Objective   Vital Signs:  /74 (BP Location: Left arm, Patient Position: Sitting, Cuff Size: Adult)   Pulse 55   Temp 98.2 °F (36.8 °C) (Oral)   Resp 17   Ht 172.7 cm (68\")   Wt 83 kg (183 lb)   SpO2 98%   BMI 27.83 kg/m²   Physical Exam  Vitals and nursing note reviewed.   Constitutional:       Appearance: Normal appearance.   HENT:      Head: Normocephalic.      Right Ear: Tympanic membrane, ear canal and external ear normal.      Left Ear: Tympanic membrane, ear canal and external ear normal.      Nose: Nose normal.      Mouth/Throat:      Mouth: Mucous membranes are moist.   Eyes:      Pupils: Pupils are equal, round, and reactive to light.   Cardiovascular:      Rate and Rhythm: Normal rate.      Pulses: Normal pulses.      Heart sounds: Murmur heard.      Systolic murmur is present with a grade of 4/6.      Comments: Holo systolic   Pulmonary:      Effort: Pulmonary effort is normal.      Breath sounds: Normal breath " sounds.   Abdominal:      General: Bowel sounds are normal.      Palpations: Abdomen is soft.   Skin:     General: Skin is warm.   Neurological:      General: No focal deficit present.      Mental Status: He is alert and oriented to person, place, and time.   Psychiatric:         Mood and Affect: Mood normal.         Thought Content: Thought content normal.                 Assessment and Plan               Type 2 diabetes mellitus with hyperglycemia, without long-term current use of insulin    Labs ordered today . Continue metformin and glipizide  Mixed hyperlipidemia     Labs ordered today taking EDTA . Taking atorvastatin  Chronic coronary artery disease  Followed by cardiology. No chest pain has DM and hypetension controlled.     Primary hypertension    Stable on medication  Vitamin D deficiency  Labs ordered  Stage 3a chronic kidney disease    Labs ordered has DM  Neuropathic pain  Stable on gabapentin  Unsteady gait  Using cane. He is using due to balance.   Chronic pain syndrome  Followed by pain managment  Medicare annual wellness visit, subsequent  Completed today. Discussed immunizations and disease management.     B12 deficiency  Labs ordered last B12 was high. Will monitorl       Orders Placed This Encounter   Procedures    Lipid Panel     Standing Status:   Future     Standing Expiration Date:   8/26/2025     Order Specific Question:   Release to patient     Answer:   Routine Release [[id number]]    Comprehensive Metabolic Panel     Standing Status:   Future     Standing Expiration Date:   8/26/2025     Order Specific Question:   Release to patient     Answer:   Routine Release [[id number]]    Microalbumin / Creatinine Urine Ratio - Urine, Clean Catch     Standing Status:   Future     Standing Expiration Date:   8/26/2025     Order Specific Question:   Release to patient     Answer:   Routine Release [[id number]]    Hemoglobin A1c     Standing Status:   Future     Standing Expiration Date:   8/26/2025      Order Specific Question:   Release to patient     Answer:   Routine Release [[id number]]    Vitamin D,25-Hydroxy     Standing Status:   Future     Standing Expiration Date:   8/26/2025     Order Specific Question:   Release to patient     Answer:   Routine Release [[id number]]    Vitamin B12     Standing Status:   Future     Standing Expiration Date:   8/26/2025     Order Specific Question:   Release to patient     Answer:   Routine Release [[id number]]    Folate     Standing Status:   Future     Standing Expiration Date:   8/26/2025     Order Specific Question:   Release to patient     Answer:   Routine Release [[id number]]             Follow Up   No follow-ups on file.  Patient was given instructions and counseling regarding his condition or for health maintenance advice. Please see specific information pulled into the AVS if appropriate.

## 2024-08-26 NOTE — PROGRESS NOTES
Subjective        Merlin Trujillo is a 74 y.o. male.     Chief Complaint   Patient presents with    Medicare Wellness-subsequent    Diabetes       History of Present Illness    The following portions of the patient's history were reviewed and updated as appropriate: allergies, current medications, past family history, past medical history, past social history, past surgical history and problem list.      Current Outpatient Medications:     Accu-Chek FastClix Lancets misc, Test blood sugars once day, Disp: 102 each, Rfl: 0    amLODIPine (NORVASC) 5 MG tablet, TAKE ONE TABLET BY MOUTH TWICE DAILY, Disp: 180 tablet, Rfl: 0    aspirin 81 MG EC tablet, Take 1 tablet by mouth Daily., Disp: , Rfl:     atenolol (TENORMIN) 50 MG tablet, TAKE 1/2 TABLET BY MOUTH TWICE DAILY, Disp: 90 tablet, Rfl: 0    atorvastatin (LIPITOR) 40 MG tablet, TAKE ONE TABLET BY MOUTH EVERY NIGHT AT BEDTIME, Disp: 90 tablet, Rfl: 0    baclofen (LIORESAL) 10 MG tablet, TAKE ONE TABLET BY MOUTH THREE TIMES DAILY, Disp: 270 tablet, Rfl: 0    cholecalciferol (VITAMIN D3) 10 MCG (400 UNIT) tablet, Take 1 tablet by mouth Daily. 1000 units, Disp: , Rfl:     cyanocobalamin 1000 MCG/ML injection, INJECT 1ML INTO THE APPROPRIATE MUSCLE AS DIRECTED BY PRESCRIBER ONE TIME FOR ONE DOSE, Disp: 1 mL, Rfl: 0    gabapentin (NEURONTIN) 600 MG tablet, Take 1 tablet by mouth 4 (Four) Times a Day As Needed (pain)., Disp: 120 tablet, Rfl: 5    glipizide (GLUCOTROL) 10 MG tablet, TAKE ONE TABLET BY MOUTH TWICE DAILY BEFORE MEALS, Disp: 180 tablet, Rfl: 0    glucose blood (Accu-Chek Guide) test strip, Test blood sugars once a day., Disp: 100 each, Rfl: 3    lubiprostone (AMITIZA) 24 MCG capsule, TAKE 1 CAPSULE BY MOUTH TWICE DAILY, Disp: 180 capsule, Rfl: 1    metFORMIN (GLUCOPHAGE) 500 MG tablet, TAKE 1 TABLET BY MOUTH 2 TIMES A DAY WITH MEALS, Disp: 180 tablet, Rfl: 0    Morphine (MS CONTIN) 15 MG 12 hr tablet, Take 1 tablet by mouth 3 (Three) Times a Day As Needed for  "Severe Pain., Disp: 90 tablet, Rfl: 0    [START ON 9/21/2024] Morphine (MS CONTIN) 15 MG 12 hr tablet, Take 1 tablet by mouth 3 (Three) Times a Day As Needed for Severe Pain. DNF before 9/21/2024, Disp: 90 tablet, Rfl: 0    multivitamin (THERAGRAN) tablet tablet, Take  by mouth Daily. Calcium+magnesium+zinc \"Sometimes\", Disp: , Rfl:     pentoxifylline (TRENtal) 400 MG CR tablet, TAKE ONE TABLET BY MOUTH THREE TIMES DAILY, Disp: 270 tablet, Rfl: 1    Syringe/Needle, Disp, (B-D 3CC LUER-RONY SYR 25GX1\") 25G X 1\" 3 ML misc, USE AS DIRECTED WITH B-12 INJECTION, Disp: 12 each, Rfl: 2    Recent Results (from the past 4032 hour(s))   POC Creatinine    Collection Time: 08/16/24  1:48 PM    Specimen: Venous Blood   Result Value Ref Range    Creatinine 0.90 0.60 - 1.30 mg/dL    eGFR 89.6 >60.0 mL/min/1.73         Review of Systems    Objective     /74 (BP Location: Left arm, Patient Position: Sitting, Cuff Size: Adult)   Pulse 55   Temp 98.2 °F (36.8 °C) (Oral)   Resp 17   Ht 172.7 cm (68\")   Wt 83 kg (183 lb)   SpO2 98%   BMI 27.83 kg/m²     Physical Exam    Result Review :                Assessment & Plan    There are no diagnoses linked to this encounter.  There are no Patient Instructions on file for this visit.    Follow Up   No follow-ups on file.    Patient was given instructions and counseling regarding his condition or for health maintenance advice. Please see specific information pulled into the AVS if appropriate.     Court Anderson, APRN    08/26/24      "

## 2024-08-26 NOTE — PATIENT INSTRUCTIONS
Get us copy of advanced care directive.   Schedule eye exam and have them send us records.   TDAP  Covid vaccine due  Follow up on lab results

## 2024-08-26 NOTE — PROGRESS NOTES
Physical Therapy Daily Progress Note      Patient: Merlin Trujillo   : 1950  Diagnosis/ICD-10 Code:  Low back pain, unspecified back pain laterality, unspecified chronicity, unspecified whether sciatica present [M54.50]  Referring practitioner: Merlin William MD  Date of Initial Visit: Type: THERAPY  Noted: 2024  Today's Date: 2024  Patient seen for 3 sessions             Subjective No significant changes to report today    Objective   See Exercise, Manual, and Modality Logs for complete treatment.       Assessment/Plan  Balance and strength deficits still noted.  Good effort with exercises.    Progress per Plan of Care           Timed:           Therapeutic Exercise:    10     mins  49041;     Neuromuscular Isaac:    10    mins  49178;    Gait Training:      15     mins  66428;         Timed Treatment:   35   mins   Total Treatment:     35   mins        Merlin Rueda PTA  Physical Therapist Assistant

## 2024-08-29 ENCOUNTER — TREATMENT (OUTPATIENT)
Dept: PHYSICAL THERAPY | Facility: CLINIC | Age: 74
End: 2024-08-29
Payer: MEDICARE

## 2024-08-29 DIAGNOSIS — M54.50 LOW BACK PAIN, UNSPECIFIED BACK PAIN LATERALITY, UNSPECIFIED CHRONICITY, UNSPECIFIED WHETHER SCIATICA PRESENT: Primary | ICD-10-CM

## 2024-08-29 PROCEDURE — 97112 NEUROMUSCULAR REEDUCATION: CPT | Performed by: PHYSICAL THERAPIST

## 2024-08-29 PROCEDURE — 97530 THERAPEUTIC ACTIVITIES: CPT | Performed by: PHYSICAL THERAPIST

## 2024-08-29 PROCEDURE — 97110 THERAPEUTIC EXERCISES: CPT | Performed by: PHYSICAL THERAPIST

## 2024-08-29 PROCEDURE — 97116 GAIT TRAINING THERAPY: CPT | Performed by: PHYSICAL THERAPIST

## 2024-08-29 NOTE — PROGRESS NOTES
Physical Therapy Daily Treatment Note  [facility] Physical Therapy  [address] [place] [address]     Patient: Merlin Trujillo   : 1950   Referring practitioner: Merlin William MD  Date of initial visit: Type: THERAPY  Noted: 2024   Today's date: 2024   Patient seen for 4 visits    Visit Diagnoses:    ICD-10-CM ICD-9-CM   1. Low back pain, unspecified back pain laterality, unspecified chronicity, unspecified whether sciatica present  M54.50 724.2       Subjective   Pt reports: No new complaints. 0/10 LBP this AM.      Objective     See Exercise, Manual, and Modality Logs for complete treatment.     Patient Education: HEP    Assessment/Plan Tolerating treatment well.      Progress per Plan of Care            Timed:         Manual Therapy:         mins  41849;     Therapeutic Exercise:    10     mins  59466;     Neuromuscular Isaac:    8    mins  26399;    Therapeutic Activity:     8     mins  99614;     Gait Trainin     mins  63610;     Ultrasound:          mins  91149;    Ionto                                   mins   83022  Self Care                            mins   70302    Un-Timed:  Electrical Stimulation:         mins  35243 ( );  Traction          mins 10444  Low Eval          Mins  32338  Mod Eval          Mins  24017  High Eval                            Mins  48430  Re-Eval                               mins  02285    Timed Treatment:   34   mins   Total Treatment:     34   mins      Anthony Rooney PT, DPT, OCS  [place] license: [id number]  Physical Therapist

## 2024-09-04 ENCOUNTER — TREATMENT (OUTPATIENT)
Dept: PHYSICAL THERAPY | Facility: CLINIC | Age: 74
End: 2024-09-04
Payer: MEDICARE

## 2024-09-04 DIAGNOSIS — M54.50 LOW BACK PAIN, UNSPECIFIED BACK PAIN LATERALITY, UNSPECIFIED CHRONICITY, UNSPECIFIED WHETHER SCIATICA PRESENT: Primary | ICD-10-CM

## 2024-09-04 NOTE — PROGRESS NOTES
Physical Therapy Daily Treatment Note  Visit: 5    Merlin Trujillo reports: small improvement in lower back pain but improved ability to walk and be on his feet.   YOB: 1950  Referring practitioner : Merlin William MD  Date of Initial: Type: THERAPY  Noted: 2024  Today's date: 2024  Patient seen for 5 sessions    Visit Diagnoses:    ICD-10-CM ICD-9-CM   1. Low back pain, unspecified back pain laterality, unspecified chronicity, unspecified whether sciatica present  M54.50 724.2       Subjective       Objective   See Exercise, Manual, and Modality Logs for complete treatment.       Assessment/Plan    Improving LE strength and activity tolerance with standing/walking. Small improvement in LBP.            Timed:           Therapeutic Exercise:    10     mins  51814;     Neuromuscular Isaac:    10    mins  93920;    Therapeutic Activity:     8     mins  64220;     Gait Trainin     mins  32482;             Timed Treatment:   36   mins   Total Treatment:     36   mins         Jerardo Guaman PT, DPT  Physical Therapist  [place] Lic #[id number]

## 2024-09-06 ENCOUNTER — TREATMENT (OUTPATIENT)
Dept: PHYSICAL THERAPY | Facility: CLINIC | Age: 74
End: 2024-09-06
Payer: MEDICARE

## 2024-09-06 DIAGNOSIS — M54.50 LOW BACK PAIN, UNSPECIFIED BACK PAIN LATERALITY, UNSPECIFIED CHRONICITY, UNSPECIFIED WHETHER SCIATICA PRESENT: Primary | ICD-10-CM

## 2024-09-06 NOTE — PROGRESS NOTES
Physical Therapy Daily Progress Note      Patient: Merlin Trujillo   : 1950  Diagnosis/ICD-10 Code:  Low back pain, unspecified back pain laterality, unspecified chronicity, unspecified whether sciatica present [M54.50]  Referring practitioner: Merlin William MD  Date of Initial Visit: Type: THERAPY  Noted: 2024  Today's Date: 2024  Patient seen for 6 sessions             Subjective Pt reports getting better.   Back stiffness is main c/o.    Objective   See Exercise, Manual, and Modality Logs for complete treatment.       Assessment/Plan  Good effort and response with rx today.  Progress as tolerated    Progress per Plan of Care           Timed:           Therapeutic Exercise:    15     mins  88163;     Neuromuscular Isaac:    12   mins  37338;    Gait Trainin     mins  99490;         Timed Treatment:   38   mins   Total Treatment:     38   mins        Merlin Rueda PTA  Physical Therapist Assistant

## 2024-09-10 ENCOUNTER — TREATMENT (OUTPATIENT)
Dept: PHYSICAL THERAPY | Facility: CLINIC | Age: 74
End: 2024-09-10
Payer: MEDICARE

## 2024-09-10 DIAGNOSIS — M54.50 LOW BACK PAIN, UNSPECIFIED BACK PAIN LATERALITY, UNSPECIFIED CHRONICITY, UNSPECIFIED WHETHER SCIATICA PRESENT: Primary | ICD-10-CM

## 2024-09-10 PROCEDURE — 97116 GAIT TRAINING THERAPY: CPT | Performed by: PHYSICAL THERAPIST

## 2024-09-10 PROCEDURE — 97110 THERAPEUTIC EXERCISES: CPT | Performed by: PHYSICAL THERAPIST

## 2024-09-10 PROCEDURE — 97112 NEUROMUSCULAR REEDUCATION: CPT | Performed by: PHYSICAL THERAPIST

## 2024-09-10 NOTE — PROGRESS NOTES
Physical Therapy Daily Treatment Note  [facility] Physical Therapy  [address] [place] [address]     Patient: Merlin Trujillo   : 1950   Referring practitioner: Merlin William MD  Date of initial visit: Type: THERAPY  Noted: 2024   Today's date: 9/10/2024   Patient seen for 7 visits    Visit Diagnoses:    ICD-10-CM ICD-9-CM   1. Low back pain, unspecified back pain laterality, unspecified chronicity, unspecified whether sciatica present  M54.50 724.2       Subjective   Pt reports: No new complaints vs last visit      Objective     See Exercise, Manual, and Modality Logs for complete treatment.     Patient Education: HEP    Assessment/Plan Fatigued post visit, progressing well. Balance remains limited.      Progress per Plan of Care            Timed:         Manual Therapy:         mins  48318;     Therapeutic Exercise:    12     mins  99839;     Neuromuscular Isaac:    10    mins  60506;    Therapeutic Activity:          mins  80416;     Gait Training:      10     mins  50069;     Ultrasound:          mins  90015;    Ionto                                   mins   72591  Self Care                            mins   15633    Un-Timed:  Electrical Stimulation:         mins  53306 ( );  Traction          mins 48463  Low Eval          Mins  34553  Mod Eval          Mins  68643  High Eval                            Mins  46573  Re-Eval                               mins  96486    Timed Treatment:   32   mins   Total Treatment:     32   mins      Anthony Rooney, PT, DPT, OCS  [place] license: [id number]  Physical Therapist

## 2024-09-12 ENCOUNTER — TREATMENT (OUTPATIENT)
Dept: PHYSICAL THERAPY | Facility: CLINIC | Age: 74
End: 2024-09-12
Payer: MEDICARE

## 2024-09-12 DIAGNOSIS — M54.50 LOW BACK PAIN, UNSPECIFIED BACK PAIN LATERALITY, UNSPECIFIED CHRONICITY, UNSPECIFIED WHETHER SCIATICA PRESENT: Primary | ICD-10-CM

## 2024-09-12 PROCEDURE — 97112 NEUROMUSCULAR REEDUCATION: CPT | Performed by: PHYSICAL THERAPIST

## 2024-09-12 PROCEDURE — 97116 GAIT TRAINING THERAPY: CPT | Performed by: PHYSICAL THERAPIST

## 2024-09-12 PROCEDURE — 97110 THERAPEUTIC EXERCISES: CPT | Performed by: PHYSICAL THERAPIST

## 2024-09-12 RX ORDER — CYANOCOBALAMIN 1000 UG/ML
INJECTION, SOLUTION INTRAMUSCULAR; SUBCUTANEOUS
Qty: 1 ML | Refills: 0 | Status: SHIPPED | OUTPATIENT
Start: 2024-09-12

## 2024-09-12 RX ORDER — ATENOLOL 50 MG/1
TABLET ORAL
Qty: 90 TABLET | Refills: 0 | Status: SHIPPED | OUTPATIENT
Start: 2024-09-12

## 2024-09-12 RX ORDER — ATORVASTATIN CALCIUM 40 MG/1
TABLET, FILM COATED ORAL
Qty: 90 TABLET | Refills: 0 | Status: SHIPPED | OUTPATIENT
Start: 2024-09-12

## 2024-09-12 RX ORDER — AMLODIPINE BESYLATE 5 MG/1
TABLET ORAL
Qty: 180 TABLET | Refills: 0 | Status: SHIPPED | OUTPATIENT
Start: 2024-09-12

## 2024-09-12 NOTE — PROGRESS NOTES
Physical Therapy Daily Progress Note      Patient: Merlin Trujillo   : 1950  Diagnosis/ICD-10 Code:  Low back pain, unspecified back pain laterality, unspecified chronicity, unspecified whether sciatica present [M54.50]  Referring practitioner: Merlin William MD  Date of Initial Visit: Type: THERAPY  Noted: 2024  Today's Date: 2024  Patient seen for 8 sessions             Subjective Back has been more sore since last visit.  He isn't sure if it's from therapy or from doing too much at home.    Objective   See Exercise, Manual, and Modality Logs for complete treatment.       Assessment/Plan  Strength and balance deficits still persist, requiring CGA with walking and stationary balance exercises    Progress per Plan of Care           Timed:           Therapeutic Exercise:    15     mins  49821;     Neuromuscular Isaac:    12    mins  93439;    Gait Trainin     mins  52762;         Timed Treatment:   38   mins   Total Treatment:     38   mins        Merlin Rueda PTA  Physical Therapist Assistant

## 2024-09-16 ENCOUNTER — TREATMENT (OUTPATIENT)
Dept: PHYSICAL THERAPY | Facility: CLINIC | Age: 74
End: 2024-09-16
Payer: MEDICARE

## 2024-09-16 DIAGNOSIS — M54.50 LOW BACK PAIN, UNSPECIFIED BACK PAIN LATERALITY, UNSPECIFIED CHRONICITY, UNSPECIFIED WHETHER SCIATICA PRESENT: Primary | ICD-10-CM

## 2024-09-16 PROCEDURE — 97112 NEUROMUSCULAR REEDUCATION: CPT | Performed by: PHYSICAL THERAPIST

## 2024-09-16 PROCEDURE — 97110 THERAPEUTIC EXERCISES: CPT | Performed by: PHYSICAL THERAPIST

## 2024-09-16 PROCEDURE — 97530 THERAPEUTIC ACTIVITIES: CPT | Performed by: PHYSICAL THERAPIST

## 2024-09-19 ENCOUNTER — TREATMENT (OUTPATIENT)
Dept: PHYSICAL THERAPY | Facility: CLINIC | Age: 74
End: 2024-09-19
Payer: MEDICARE

## 2024-09-19 DIAGNOSIS — M54.50 LOW BACK PAIN, UNSPECIFIED BACK PAIN LATERALITY, UNSPECIFIED CHRONICITY, UNSPECIFIED WHETHER SCIATICA PRESENT: Primary | ICD-10-CM

## 2024-09-19 PROCEDURE — 97116 GAIT TRAINING THERAPY: CPT | Performed by: PHYSICAL THERAPIST

## 2024-09-19 PROCEDURE — 97110 THERAPEUTIC EXERCISES: CPT | Performed by: PHYSICAL THERAPIST

## 2024-09-19 PROCEDURE — 97112 NEUROMUSCULAR REEDUCATION: CPT | Performed by: PHYSICAL THERAPIST

## 2024-09-23 ENCOUNTER — TREATMENT (OUTPATIENT)
Dept: PHYSICAL THERAPY | Facility: CLINIC | Age: 74
End: 2024-09-23
Payer: MEDICARE

## 2024-09-23 DIAGNOSIS — M54.50 LOW BACK PAIN, UNSPECIFIED BACK PAIN LATERALITY, UNSPECIFIED CHRONICITY, UNSPECIFIED WHETHER SCIATICA PRESENT: Primary | ICD-10-CM

## 2024-09-23 PROCEDURE — 97110 THERAPEUTIC EXERCISES: CPT | Performed by: PHYSICAL THERAPIST

## 2024-09-23 PROCEDURE — G0283 ELEC STIM OTHER THAN WOUND: HCPCS | Performed by: PHYSICAL THERAPIST

## 2024-09-23 PROCEDURE — 97140 MANUAL THERAPY 1/> REGIONS: CPT | Performed by: PHYSICAL THERAPIST

## 2024-09-23 PROCEDURE — 97112 NEUROMUSCULAR REEDUCATION: CPT | Performed by: PHYSICAL THERAPIST

## 2024-09-26 ENCOUNTER — TREATMENT (OUTPATIENT)
Dept: PHYSICAL THERAPY | Facility: CLINIC | Age: 74
End: 2024-09-26
Payer: MEDICARE

## 2024-09-26 DIAGNOSIS — M54.50 LOW BACK PAIN, UNSPECIFIED BACK PAIN LATERALITY, UNSPECIFIED CHRONICITY, UNSPECIFIED WHETHER SCIATICA PRESENT: Primary | ICD-10-CM

## 2024-09-26 PROCEDURE — 97110 THERAPEUTIC EXERCISES: CPT | Performed by: PHYSICAL THERAPIST

## 2024-09-26 PROCEDURE — G0283 ELEC STIM OTHER THAN WOUND: HCPCS | Performed by: PHYSICAL THERAPIST

## 2024-09-26 PROCEDURE — 97112 NEUROMUSCULAR REEDUCATION: CPT | Performed by: PHYSICAL THERAPIST

## 2024-09-30 ENCOUNTER — TREATMENT (OUTPATIENT)
Dept: PHYSICAL THERAPY | Facility: CLINIC | Age: 74
End: 2024-09-30
Payer: MEDICARE

## 2024-09-30 DIAGNOSIS — M54.50 LOW BACK PAIN, UNSPECIFIED BACK PAIN LATERALITY, UNSPECIFIED CHRONICITY, UNSPECIFIED WHETHER SCIATICA PRESENT: Primary | ICD-10-CM

## 2024-09-30 PROCEDURE — G0283 ELEC STIM OTHER THAN WOUND: HCPCS | Performed by: PHYSICAL THERAPIST

## 2024-09-30 PROCEDURE — 97140 MANUAL THERAPY 1/> REGIONS: CPT | Performed by: PHYSICAL THERAPIST

## 2024-09-30 PROCEDURE — 97112 NEUROMUSCULAR REEDUCATION: CPT | Performed by: PHYSICAL THERAPIST

## 2024-09-30 PROCEDURE — 97110 THERAPEUTIC EXERCISES: CPT | Performed by: PHYSICAL THERAPIST

## 2024-09-30 NOTE — PROGRESS NOTES
Physical Therapy Daily Treatment Note  [facility] Physical Therapy  [address] [place] [address]     Patient: Merlin Trujillo   : 1950   Referring practitioner: Merlin William MD  Date of initial visit: Type: THERAPY  Noted: 2024   Today's date: 2024   Patient seen for 13 visits    Visit Diagnoses:    ICD-10-CM ICD-9-CM   1. Low back pain, unspecified back pain laterality, unspecified chronicity, unspecified whether sciatica present  M54.50 724.2       Subjective   Pt reports: LBP improved over the weekend. HEP going well. Primarily limited with standing and walking.      Objective     See Exercise, Manual, and Modality Logs for complete treatment.     Patient Education: HEP    Assessment/Plan Fatigued post visit, tolerated visit well.      Progress per Plan of Care            Timed:         Manual Therapy:    8     mins  83012;     Therapeutic Exercise:    23     mins  03297;     Neuromuscular Isaac:    8    mins  00154;    Therapeutic Activity:          mins  85693;     Gait Training:           mins  21181;     Ultrasound:          mins  66157;    Ionto                                   mins   83602  Self Care                            mins   50942    Un-Timed:  Electrical Stimulation:    15     mins  01641 ( );  Traction          mins 22242  Low Eval          Mins  98703  Mod Eval          Mins  73676  High Eval                            Mins  29625  Re-Eval                               mins  20481    Timed Treatment:   39   mins   Total Treatment:     54   mins      Anthony Rooney, PT, DPT, OCS  [place] license: [id number]  Physical Therapist

## 2024-10-02 ENCOUNTER — TREATMENT (OUTPATIENT)
Dept: PHYSICAL THERAPY | Facility: CLINIC | Age: 74
End: 2024-10-02
Payer: MEDICARE

## 2024-10-02 DIAGNOSIS — M54.50 LOW BACK PAIN, UNSPECIFIED BACK PAIN LATERALITY, UNSPECIFIED CHRONICITY, UNSPECIFIED WHETHER SCIATICA PRESENT: Primary | ICD-10-CM

## 2024-10-02 PROCEDURE — 97112 NEUROMUSCULAR REEDUCATION: CPT | Performed by: PHYSICAL THERAPIST

## 2024-10-02 PROCEDURE — G0283 ELEC STIM OTHER THAN WOUND: HCPCS | Performed by: PHYSICAL THERAPIST

## 2024-10-02 PROCEDURE — 97110 THERAPEUTIC EXERCISES: CPT | Performed by: PHYSICAL THERAPIST

## 2024-10-02 PROCEDURE — 97140 MANUAL THERAPY 1/> REGIONS: CPT | Performed by: PHYSICAL THERAPIST

## 2024-10-02 NOTE — LETTER
Re-Assessment / Re-Certification        Patient: Merlin Trujillo   : 1950  Diagnosis/ICD-10 Code:  Low back pain, unspecified back pain laterality, unspecified chronicity, unspecified whether sciatica present [M54.50]  Referring practitioner: Merlin William MD  Date of Initial Visit: Type: THERAPY  Noted: 2024  Today's Date: 10/2/2024  Patient seen for 14 sessions      Subjective:     Subjective Questionnaire: Oswestry: 34%  Clinical Progress: improved  Home Program Compliance: Yes  Treatment has included: therapeutic exercise, neuromuscular re-education, manual therapy, therapeutic activity, and gait training    Subjective Pt still rates worst pain at 8/10 but feels his activity tolerance has continued to improve. Can now walk for ~15 minutes before needing to stop. HEP is going well.  Objective   Active Range of Motion      Lumbar   Normal active range of motion  Extension: with pain  Right lateral flexion: with pain     Strength/Myotome Testing      Left Hip   Planes of Motion   Flexion: 4     Right Hip   Planes of Motion   Flexion: 4     Additional Strength Details  LE strength 5/5 otherwise      Additional Tests Details  Further provocative testing is negative    Tinetti   TUG 11.2 seconds  Five times sit to stand 16.2 seconds  Four square step test 13 seconds  Assessment/Plan  Pt has continued to make good progress to this point re: activity tolerance, performance tests, function, and exercise tolerance. Recommend continuing with PT evaluation and treatment to address his remaining impairments.    Short term goals, 1 week: Tolerate HEP progression. met  Voice compliance with activity modification. met  Report improvement in symptoms. met     Long term goals, 5 weeks: Improve YAMINI score to 38%. met  AROM to be wfl and non provocative with testing. Not met  4+/5 hip flexion strength to facilitate swing phase of gait. progressing  Improve TUG time to 14 seconds. met  Improve five times  sit to stand time to 13 seconds.progressing  Improve four square step test to 14 seconds. met  Independent with HEP. progressing    Continue BIW for 5 weeks    Anthony Rooney, PT, DPT, OCS  [place] license: [id number]  Physical Therapist      Timed:         Manual Therapy:         mins  10973;     Therapeutic Exercise:    23    mins  97487;     Neuromuscular Isaac:    15    mins  98555;    Therapeutic Activity:     8     mins  98682;     Gait Training:           mins  10401;     Ultrasound:          mins  32976;    Ionto                                   mins   21216  Self Care                            mins   90684    Un-Timed:  Electrical Stimulation:         mins  39290 ( );  Traction          mins 87595  Low Eval          Mins  84204  Mod Eval          Mins  16704  High Eval                            Mins  52987  Re-Eval                               mins  15718      Timed Treatment:   46   mins   Total Treatment:     46   mins

## 2024-10-02 NOTE — LETTER
Re-Assessment / Re-Certification        Patient: Merlin Trujillo   : 1950  Diagnosis/ICD-10 Code:  Low back pain, unspecified back pain laterality, unspecified chronicity, unspecified whether sciatica present [M54.50]  Referring practitioner: Merlin William MD  Date of Initial Visit: Type: THERAPY  Noted: 2024  Today's Date: 10/2/2024  Patient seen for 14 sessions      Subjective:     Subjective Questionnaire: Oswestry: 34%  Clinical Progress: improved  Home Program Compliance: Yes  Treatment has included: therapeutic exercise, neuromuscular re-education, manual therapy, therapeutic activity, and gait training    Subjective Pt still rates worst pain at 8/10 but feels his activity tolerance has continued to improve when caring for his animals. Can now walk for ~10 minutes before needing to stop. HEP is going well.  Objective   Active Range of Motion      Lumbar   Normal active range of motion  Extension: with pain  Right lateral flexion: with pain     Strength/Myotome Testing      Left Hip   Planes of Motion   Flexion: 4     Right Hip   Planes of Motion   Flexion: 4     Additional Strength Details  LE strength 5/5 otherwise      Additional Tests Details  Further provocative testing is negative    Tinetti   TUG 11.2 seconds  Five times sit to stand 16.2 seconds  Four square step test 13 seconds  Assessment/Plan  Pt has continued to make good progress to this point re: activity tolerance, performance tests, function, and exercise tolerance. Recommend continuing with PT evaluation and treatment to address his remaining impairments.    Short term goals, 1 week: Tolerate HEP progression. met  Voice compliance with activity modification. met  Report improvement in symptoms. met     Long term goals, 5 weeks: Improve YAMINI score to 38%. met  AROM to be wfl and non provocative with testing. Not met  4+/5 hip flexion strength to facilitate swing phase of gait. progressing  Improve TUG time to 14 seconds.  met  Improve five times sit to stand time to 13 seconds.progressing  Improve four square step test to 14 seconds. met  Independent with HEP. progressing    Continue BIW for 5 weeks    Anthony Rooney, PT, DPT, OCS  [place] license: [id number]  Physical Therapist      Timed:         Manual Therapy:   8      mins  98033;     Therapeutic Exercise:    23    mins  18763;     Neuromuscular Isaac:    8    mins  30371;    Therapeutic Activity:         mins  58154;     Gait Training:           mins  37920;     Ultrasound:          mins  20048;    Ionto                                   mins   17155  Self Care                            mins   04719    Un-Timed:  Electrical Stimulation:       15  mins  71459 ( );  Traction          mins 21169  Low Eval          Mins  43274  Mod Eval          Mins  05610  High Eval                            Mins  90961  Re-Eval                               mins  04848      Timed Treatment:   39  mins   Total Treatment:     54   mins

## 2024-10-02 NOTE — PROGRESS NOTES
Re-Assessment / Re-Certification        Patient: Merlin Trujillo   : 1950  Diagnosis/ICD-10 Code:  Low back pain, unspecified back pain laterality, unspecified chronicity, unspecified whether sciatica present [M54.50]  Referring practitioner: Merlin William MD  Date of Initial Visit: Type: THERAPY  Noted: 2024  Today's Date: 10/2/2024  Patient seen for 14 sessions      Subjective:     Subjective Questionnaire: Oswestry: 34%  Clinical Progress: improved  Home Program Compliance: Yes  Treatment has included: therapeutic exercise, neuromuscular re-education, manual therapy, therapeutic activity, and gait training    Subjective Pt still rates worst pain at 8/10 but feels his activity tolerance has continued to improve when caring for his animals. Can now walk for ~10 minutes before needing to stop. HEP is going well.  Objective   Active Range of Motion      Lumbar   Normal active range of motion  Extension: with pain  Right lateral flexion: with pain     Strength/Myotome Testing      Left Hip   Planes of Motion   Flexion: 4     Right Hip   Planes of Motion   Flexion: 4     Additional Strength Details  LE strength 5/5 otherwise      Additional Tests Details  Further provocative testing is negative    Tinetti   TUG 11.2 seconds  Five times sit to stand 16.2 seconds  Four square step test 13 seconds  Assessment/Plan  Pt has continued to make good progress to this point re: activity tolerance, performance tests, function, and exercise tolerance. Recommend continuing with PT evaluation and treatment to address his remaining impairments.    Short term goals, 1 week: Tolerate HEP progression. met  Voice compliance with activity modification. met  Report improvement in symptoms. met     Long term goals, 5 weeks: Improve YAMINI score to 38%. met  AROM to be wfl and non provocative with testing. Not met  4+/5 hip flexion strength to facilitate swing phase of gait. progressing  Improve TUG time to 14 seconds.  met  Improve five times sit to stand time to 13 seconds.progressing  Improve four square step test to 14 seconds. met  Independent with HEP. progressing    Continue BIW for 5 weeks    Anthony Rooney, PT, DPT, OCS  [place] license: [id number]  Physical Therapist      Timed:         Manual Therapy:   8      mins  99332;     Therapeutic Exercise:    23    mins  04390;     Neuromuscular Isaac:    8    mins  28231;    Therapeutic Activity:         mins  36173;     Gait Training:           mins  26395;     Ultrasound:          mins  91426;    Ionto                                   mins   58996  Self Care                            mins   32716    Un-Timed:  Electrical Stimulation:       15  mins  23572 ( );  Traction          mins 90002  Low Eval          Mins  43261  Mod Eval          Mins  47284  High Eval                            Mins  81007  Re-Eval                               mins  04417      Timed Treatment:   39  mins   Total Treatment:     54   mins

## 2024-10-12 RX ORDER — PENTOXIFYLLINE 400 MG/1
TABLET, EXTENDED RELEASE ORAL
Qty: 270 TABLET | Refills: 0 | Status: SHIPPED | OUTPATIENT
Start: 2024-10-12

## 2024-10-12 RX ORDER — CYANOCOBALAMIN 1000 UG/ML
INJECTION, SOLUTION INTRAMUSCULAR; SUBCUTANEOUS
Qty: 1 ML | Refills: 0 | Status: SHIPPED | OUTPATIENT
Start: 2024-10-12

## 2024-10-12 RX ORDER — GLIPIZIDE 10 MG/1
TABLET ORAL
Qty: 180 TABLET | Refills: 0 | Status: SHIPPED | OUTPATIENT
Start: 2024-10-12

## 2024-10-14 ENCOUNTER — OFFICE VISIT (OUTPATIENT)
Dept: PAIN MEDICINE | Facility: CLINIC | Age: 74
End: 2024-10-14
Payer: MEDICARE

## 2024-10-14 VITALS
HEART RATE: 60 BPM | SYSTOLIC BLOOD PRESSURE: 156 MMHG | RESPIRATION RATE: 16 BRPM | BODY MASS INDEX: 27.83 KG/M2 | WEIGHT: 183 LBS | DIASTOLIC BLOOD PRESSURE: 67 MMHG | OXYGEN SATURATION: 98 %

## 2024-10-14 DIAGNOSIS — Z79.899 HIGH RISK MEDICATION USE: ICD-10-CM

## 2024-10-14 DIAGNOSIS — M79.2 NEUROPATHIC PAIN: ICD-10-CM

## 2024-10-14 DIAGNOSIS — M47.812 CERVICAL SPONDYLOSIS WITHOUT MYELOPATHY: ICD-10-CM

## 2024-10-14 DIAGNOSIS — G89.4 CHRONIC PAIN SYNDROME: Primary | ICD-10-CM

## 2024-10-14 DIAGNOSIS — M47.817 LUMBOSACRAL SPONDYLOSIS WITHOUT MYELOPATHY: ICD-10-CM

## 2024-10-14 RX ORDER — MORPHINE SULFATE 15 MG/1
15 TABLET, FILM COATED, EXTENDED RELEASE ORAL 3 TIMES DAILY PRN
Qty: 90 TABLET | Refills: 0 | Status: SHIPPED | OUTPATIENT
Start: 2024-10-21

## 2024-10-14 RX ORDER — MORPHINE SULFATE 15 MG/1
15 TABLET, FILM COATED, EXTENDED RELEASE ORAL 3 TIMES DAILY PRN
Qty: 90 TABLET | Refills: 0 | Status: SHIPPED | OUTPATIENT
Start: 2024-11-20

## 2024-10-15 ENCOUNTER — TELEPHONE (OUTPATIENT)
Dept: ORTHOPEDICS | Facility: OTHER | Age: 74
End: 2024-10-15
Payer: MEDICARE
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
## 2024-10-17 ENCOUNTER — TREATMENT (OUTPATIENT)
Dept: PHYSICAL THERAPY | Facility: CLINIC | Age: 74
End: 2024-10-17
Payer: MEDICARE

## 2024-10-17 DIAGNOSIS — M54.50 LOW BACK PAIN, UNSPECIFIED BACK PAIN LATERALITY, UNSPECIFIED CHRONICITY, UNSPECIFIED WHETHER SCIATICA PRESENT: Primary | ICD-10-CM

## 2024-10-17 PROCEDURE — 97110 THERAPEUTIC EXERCISES: CPT | Performed by: PHYSICAL THERAPIST

## 2024-10-17 PROCEDURE — 97112 NEUROMUSCULAR REEDUCATION: CPT | Performed by: PHYSICAL THERAPIST

## 2024-10-17 NOTE — PROGRESS NOTES
Physical Therapy Daily Treatment Note  [facility]  [address] [place] [address]     Patient: Merlin Trujillo   : 1950   Referring practitioner: Merlin William MD  Date of initial visit: Type: THERAPY  Noted: 2024   Today's date: 10/17/2024   Patient seen for 15 visits    Visit Diagnoses:    ICD-10-CM ICD-9-CM   1. Low back pain, unspecified back pain laterality, unspecified chronicity, unspecified whether sciatica present  M54.50 724.2       Subjective   Pt reports: No new complaints. Still having LBP with standing activities. HEP going well.      Objective     See Exercise, Manual, and Modality Logs for complete treatment.     Patient Education: HEP    Assessment/Plan Tolerated visit well. Needs to continue with graded exposure.      Progress per Plan of Care            Timed:         Manual Therapy:         mins  48563;     Therapeutic Exercise:    30     mins  62382;     Neuromuscular Isaac:    15    mins  72398;    Therapeutic Activity:          mins  82017;     Gait Training:           mins  72124;     Ultrasound:          mins  26953;    Ionto                                   mins   75686  Self Care                            mins   91968    Un-Timed:  Electrical Stimulation:         mins  22306 ( );  Traction          mins 95766  Low Eval          Mins  46975  Mod Eval          Mins  72027  High Eval                            Mins  49155  Re-Eval                               mins  72620    Timed Treatment:   45   mins   Total Treatment:     45   mins      Anthony Rooney PT, DPT, OCS  [place] license: [id number]  Physical Therapist

## 2024-10-22 ENCOUNTER — TREATMENT (OUTPATIENT)
Dept: PHYSICAL THERAPY | Facility: CLINIC | Age: 74
End: 2024-10-22
Payer: MEDICARE

## 2024-10-22 DIAGNOSIS — M54.50 LOW BACK PAIN, UNSPECIFIED BACK PAIN LATERALITY, UNSPECIFIED CHRONICITY, UNSPECIFIED WHETHER SCIATICA PRESENT: Primary | ICD-10-CM

## 2024-10-22 PROCEDURE — 97112 NEUROMUSCULAR REEDUCATION: CPT | Performed by: PHYSICAL THERAPIST

## 2024-10-22 PROCEDURE — 97110 THERAPEUTIC EXERCISES: CPT | Performed by: PHYSICAL THERAPIST

## 2024-10-22 NOTE — PROGRESS NOTES
Physical Therapy Daily Treatment Note  [facility]  [address] [place] [address]     Patient: Merlin Trujillo   : 1950   Referring practitioner: Merlin William MD  Date of initial visit: Type: THERAPY  Noted: 2024   Today's date: 10/22/2024   Patient seen for 16 visits    Visit Diagnoses:    ICD-10-CM ICD-9-CM   1. Low back pain, unspecified back pain laterality, unspecified chronicity, unspecified whether sciatica present  M54.50 724.2       Subjective   Pt reports: Fatigued in general today. Still limited at times by his LBP. HEP going well.       Objective     See Exercise, Manual, and Modality Logs for complete treatment.     Patient Education: HEP    Assessment/Plan Good response to rx, no increase in pain post visit.      Progress per Plan of Care            Timed:         Manual Therapy:         mins  97430;     Therapeutic Exercise:    25     mins  77384;     Neuromuscular Isaac:    10    mins  19543;    Therapeutic Activity:          mins  27119;     Gait Training:           mins  04151;     Ultrasound:          mins  26086;    Ionto                                   mins   50808  Self Care                            mins   71990    Un-Timed:  Electrical Stimulation:         mins  76575 ( );  Traction          mins 02712  Low Eval          Mins  50635  Mod Eval          Mins  57890  High Eval                            Mins  14701  Re-Eval                               mins  36020    Timed Treatment:   35   mins   Total Treatment:     35   mins      Anthony Rooney, PT, DPT, OCS  [place] license: [id number]  Physical Therapist

## 2024-10-24 ENCOUNTER — TREATMENT (OUTPATIENT)
Dept: PHYSICAL THERAPY | Facility: CLINIC | Age: 74
End: 2024-10-24
Payer: MEDICARE

## 2024-10-24 DIAGNOSIS — M54.50 LOW BACK PAIN, UNSPECIFIED BACK PAIN LATERALITY, UNSPECIFIED CHRONICITY, UNSPECIFIED WHETHER SCIATICA PRESENT: Primary | ICD-10-CM

## 2024-10-24 PROCEDURE — G0283 ELEC STIM OTHER THAN WOUND: HCPCS | Performed by: PHYSICAL THERAPIST

## 2024-10-24 PROCEDURE — 97140 MANUAL THERAPY 1/> REGIONS: CPT | Performed by: PHYSICAL THERAPIST

## 2024-10-24 PROCEDURE — 97110 THERAPEUTIC EXERCISES: CPT | Performed by: PHYSICAL THERAPIST

## 2024-10-24 PROCEDURE — 97112 NEUROMUSCULAR REEDUCATION: CPT | Performed by: PHYSICAL THERAPIST

## 2024-10-24 NOTE — PROGRESS NOTES
Physical Therapy Daily Treatment Note  [facility] Therapy  [address] [place] [address]     Patient: Merlin Trujillo   : 1950   Referring practitioner: Merlin William MD  Date of initial visit: Type: THERAPY  Noted: 2024   Today's date: 10/24/2024   Patient seen for 17 visits    Visit Diagnoses:    ICD-10-CM ICD-9-CM   1. Low back pain, unspecified back pain laterality, unspecified chronicity, unspecified whether sciatica present  M54.50 724.2       Subjective   Pt reports: More LBP today, relates this to working with his chickens earlier today. HEP going well.       Objective     See Exercise, Manual, and Modality Logs for complete treatment.     Patient Education: HEP    Assessment/Plan Less pain after IFC and manual therapy. Continue as tolerated.      Progress per Plan of Care            Timed:         Manual Therapy:    8     mins  19367;     Therapeutic Exercise:    8     mins  71481;     Neuromuscular Isaac:    8    mins  16942;    Therapeutic Activity:          mins  47144;     Gait Training:           mins  21378;     Ultrasound:          mins  49309;    Ionto                                   mins   63418  Self Care                            mins   83762    Un-Timed:  Electrical Stimulation:       15  mins  97807 ( );  Traction          mins 14068  Low Eval          Mins  42545  Mod Eval          Mins  67323  High Eval                            Mins  16219  Re-Eval                               mins  83418    Timed Treatment:   24   mins   Total Treatment:     39   mins      Anthony Rooney, PT, DPT, OCS  [place] license: [id number]  Physical Therapist

## 2024-10-29 ENCOUNTER — LAB (OUTPATIENT)
Dept: LAB | Facility: HOSPITAL | Age: 74
End: 2024-10-29
Payer: MEDICARE

## 2024-10-29 ENCOUNTER — TREATMENT (OUTPATIENT)
Dept: PHYSICAL THERAPY | Facility: CLINIC | Age: 74
End: 2024-10-29
Payer: MEDICARE

## 2024-10-29 DIAGNOSIS — E11.65 TYPE 2 DIABETES MELLITUS WITH HYPERGLYCEMIA, WITHOUT LONG-TERM CURRENT USE OF INSULIN: Chronic | ICD-10-CM

## 2024-10-29 DIAGNOSIS — E53.8 B12 DEFICIENCY: ICD-10-CM

## 2024-10-29 DIAGNOSIS — E55.9 VITAMIN D DEFICIENCY: Chronic | ICD-10-CM

## 2024-10-29 DIAGNOSIS — M54.50 LOW BACK PAIN, UNSPECIFIED BACK PAIN LATERALITY, UNSPECIFIED CHRONICITY, UNSPECIFIED WHETHER SCIATICA PRESENT: Primary | ICD-10-CM

## 2024-10-29 LAB
25(OH)D3 SERPL-MCNC: 27.5 NG/ML (ref 30–100)
ALBUMIN SERPL-MCNC: 4.4 G/DL (ref 3.5–5.2)
ALBUMIN UR-MCNC: 3.9 MG/DL
ALBUMIN/GLOB SERPL: 1.5 G/DL
ALP SERPL-CCNC: 108 U/L (ref 39–117)
ALT SERPL W P-5'-P-CCNC: 19 U/L (ref 1–41)
ANION GAP SERPL CALCULATED.3IONS-SCNC: 8 MMOL/L (ref 5–15)
AST SERPL-CCNC: 17 U/L (ref 1–40)
BILIRUB SERPL-MCNC: 0.6 MG/DL (ref 0–1.2)
BUN SERPL-MCNC: 20 MG/DL (ref 8–23)
BUN/CREAT SERPL: 18.5 (ref 7–25)
CALCIUM SPEC-SCNC: 9 MG/DL (ref 8.6–10.5)
CHLORIDE SERPL-SCNC: 104 MMOL/L (ref 98–107)
CHOLEST SERPL-MCNC: 125 MG/DL (ref 0–200)
CO2 SERPL-SCNC: 27 MMOL/L (ref 22–29)
CREAT SERPL-MCNC: 1.08 MG/DL (ref 0.76–1.27)
CREAT UR-MCNC: 124.1 MG/DL
EGFRCR SERPLBLD CKD-EPI 2021: 72 ML/MIN/1.73
FOLATE SERPL-MCNC: 12.8 NG/ML (ref 4.78–24.2)
GLOBULIN UR ELPH-MCNC: 2.9 GM/DL
GLUCOSE SERPL-MCNC: 107 MG/DL (ref 65–99)
HBA1C MFR BLD: 6.6 % (ref 4.8–5.6)
HDLC SERPL-MCNC: 52 MG/DL (ref 40–60)
LDLC SERPL CALC-MCNC: 54 MG/DL (ref 0–100)
LDLC/HDLC SERPL: 1 {RATIO}
MICROALBUMIN/CREAT UR: 31.4 MG/G (ref 0–29)
POTASSIUM SERPL-SCNC: 4.2 MMOL/L (ref 3.5–5.2)
PROT SERPL-MCNC: 7.3 G/DL (ref 6–8.5)
SODIUM SERPL-SCNC: 139 MMOL/L (ref 136–145)
TRIGL SERPL-MCNC: 105 MG/DL (ref 0–150)
VIT B12 BLD-MCNC: 672 PG/ML (ref 211–946)
VLDLC SERPL-MCNC: 19 MG/DL (ref 5–40)

## 2024-10-29 PROCEDURE — G0283 ELEC STIM OTHER THAN WOUND: HCPCS | Performed by: PHYSICAL THERAPIST

## 2024-10-29 PROCEDURE — 97140 MANUAL THERAPY 1/> REGIONS: CPT | Performed by: PHYSICAL THERAPIST

## 2024-10-29 PROCEDURE — 97110 THERAPEUTIC EXERCISES: CPT | Performed by: PHYSICAL THERAPIST

## 2024-10-29 PROCEDURE — 82570 ASSAY OF URINE CREATININE: CPT

## 2024-10-29 PROCEDURE — 80061 LIPID PANEL: CPT

## 2024-10-29 PROCEDURE — 83036 HEMOGLOBIN GLYCOSYLATED A1C: CPT

## 2024-10-29 PROCEDURE — 82746 ASSAY OF FOLIC ACID SERUM: CPT

## 2024-10-29 PROCEDURE — 82043 UR ALBUMIN QUANTITATIVE: CPT

## 2024-10-29 PROCEDURE — 82607 VITAMIN B-12: CPT

## 2024-10-29 PROCEDURE — 36415 COLL VENOUS BLD VENIPUNCTURE: CPT

## 2024-10-29 PROCEDURE — 97112 NEUROMUSCULAR REEDUCATION: CPT | Performed by: PHYSICAL THERAPIST

## 2024-10-29 PROCEDURE — 82306 VITAMIN D 25 HYDROXY: CPT

## 2024-10-29 PROCEDURE — 80053 COMPREHEN METABOLIC PANEL: CPT

## 2024-10-29 NOTE — PROGRESS NOTES
Re-Assessment / Re-Certification        Patient: Merlin Trujillo   : 1950  Diagnosis/ICD-10 Code:  Low back pain, unspecified back pain laterality, unspecified chronicity, unspecified whether sciatica present [M54.50]  Referring practitioner: Merlin William MD  Date of Initial Visit: Type: THERAPY  Noted: 2024  Today's Date: 10/29/2024  Patient seen for 18 sessions      Subjective:     Subjective Questionnaire: Oswestry: 34%  Clinical Progress: improved  Home Program Compliance: Yes  Treatment has included: therapeutic exercise, neuromuscular re-education, manual therapy, therapeutic activity, and gait training    Subjective Pt still rates worst pain at 8/10 and feels activity tolerance is slowly improving. Can now walk for ~10 minutes before needing to stop. HEP is going well. Continues to be limited by his LBP.  Objective   Active Range of Motion      Lumbar   Normal active range of motion  Extension: with pain  Right lateral flexion: with pain     Strength/Myotome Testing      Left Hip   Planes of Motion   Flexion: 4+     Right Hip   Planes of Motion   Flexion: 4+     Additional Strength Details  LE strength 5/5 otherwise      Additional Tests Details  Further provocative testing is negative    Tinetti   TUG 11.2 seconds  Five times sit to stand 16.1 seconds  Four square step test 13 seconds  Assessment/Plan  Pt has continued to make good progress to this point re: activity tolerance, LE strength, function, and exercise tolerance. Recommend continuing with PT evaluation and treatment to address his remaining impairments.    Short term goals, 1 week: Tolerate HEP progression. met  Voice compliance with activity modification. met  Report improvement in symptoms. met     Long term goals, 5 weeks: Improve YAMINI score to 38%. met  AROM to be wfl and non provocative with testing. Not met  4+/5 hip flexion strength to facilitate swing phase of gait. met  Improve TUG time to 14 seconds.  met  Improve five times sit to stand time to 13 seconds.not met  Improve four square step test to 14 seconds. met  Independent with HEP. progressing    Continue BIW for 5 weeks    Anthony Rooney, PT, DPT, OCS  [place] license: [id number]  Physical Therapist      Timed:         Manual Therapy:  15      mins  22882;     Therapeutic Exercise:    8    mins  42991;     Neuromuscular Isaac:    8    mins  95130;    Therapeutic Activity:         mins  82042;     Gait Training:           mins  08993;     Ultrasound:          mins  79457;    Ionto                                   mins   42541  Self Care                            mins   53234    Un-Timed:  Electrical Stimulation:       15  mins  14033 ( );  Traction          mins 16582  Low Eval          Mins  10734  Mod Eval          Mins  66438  High Eval                            Mins  59973  Re-Eval                               mins  70671      Timed Treatment:   31 mins   Total Treatment:     46 mins

## 2024-10-29 NOTE — LETTER
Re-Assessment / Re-Certification        Patient: Merlin Trujillo   : 1950  Diagnosis/ICD-10 Code:  Low back pain, unspecified back pain laterality, unspecified chronicity, unspecified whether sciatica present [M54.50]  Referring practitioner: Merlin William MD  Date of Initial Visit: Type: THERAPY  Noted: 2024  Today's Date: 10/29/2024  Patient seen for 18 sessions      Subjective:     Subjective Questionnaire: Oswestry: 34%  Clinical Progress: improved  Home Program Compliance: Yes  Treatment has included: therapeutic exercise, neuromuscular re-education, manual therapy, therapeutic activity, and gait training    Subjective Pt still rates worst pain at 8/10 and feels activity tolerance is slowly improving. Can now walk for ~10 minutes before needing to stop. HEP is going well. Continues to be limited by his LBP.  Objective   Active Range of Motion      Lumbar   Normal active range of motion  Extension: with pain  Right lateral flexion: with pain     Strength/Myotome Testing      Left Hip   Planes of Motion   Flexion: 4+     Right Hip   Planes of Motion   Flexion: 4+     Additional Strength Details  LE strength 5/5 otherwise      Additional Tests Details  Further provocative testing is negative    Tinetti   TUG 11.2 seconds  Five times sit to stand 16.1 seconds  Four square step test 13 seconds  Assessment/Plan  Pt has continued to make good progress to this point re: activity tolerance, LE strength, function, and exercise tolerance. Recommend continuing with PT evaluation and treatment to address his remaining impairments.    Short term goals, 1 week: Tolerate HEP progression. met  Voice compliance with activity modification. met  Report improvement in symptoms. met     Long term goals, 5 weeks: Improve YAMINI score to 38%. met  AROM to be wfl and non provocative with testing. Not met  4+/5 hip flexion strength to facilitate swing phase of gait. met  Improve TUG time to 14 seconds.  met  Improve five times sit to stand time to 13 seconds.not met  Improve four square step test to 14 seconds. met  Independent with HEP. progressing    Continue BIW for 5 weeks    Anthony Rooney, PT, DPT, OCS  [place] license: [id number]  Physical Therapist      Timed:         Manual Therapy:  15      mins  76158;     Therapeutic Exercise:    8    mins  11529;     Neuromuscular Isaac:    8    mins  73122;    Therapeutic Activity:         mins  39595;     Gait Training:           mins  20063;     Ultrasound:          mins  69342;    Ionto                                   mins   07499  Self Care                            mins   14700    Un-Timed:  Electrical Stimulation:       15  mins  47933 ( );  Traction          mins 96115  Low Eval          Mins  00508  Mod Eval          Mins  65103  High Eval                            Mins  94908  Re-Eval                               mins  11085      Timed Treatment:   31 mins   Total Treatment:     46 mins

## 2024-11-13 ENCOUNTER — TREATMENT (OUTPATIENT)
Dept: PHYSICAL THERAPY | Facility: CLINIC | Age: 74
End: 2024-11-13
Payer: MEDICARE

## 2024-11-13 DIAGNOSIS — M54.50 LOW BACK PAIN, UNSPECIFIED BACK PAIN LATERALITY, UNSPECIFIED CHRONICITY, UNSPECIFIED WHETHER SCIATICA PRESENT: Primary | ICD-10-CM

## 2024-11-13 PROCEDURE — 97112 NEUROMUSCULAR REEDUCATION: CPT | Performed by: PHYSICAL THERAPIST

## 2024-11-13 PROCEDURE — 97110 THERAPEUTIC EXERCISES: CPT | Performed by: PHYSICAL THERAPIST

## 2024-11-13 RX ORDER — CYANOCOBALAMIN 1000 UG/ML
INJECTION, SOLUTION INTRAMUSCULAR; SUBCUTANEOUS
Qty: 1 ML | Refills: 0 | Status: SHIPPED | OUTPATIENT
Start: 2024-11-13

## 2024-11-13 RX ORDER — LUBIPROSTONE 24 UG/1
CAPSULE ORAL
Qty: 180 CAPSULE | Refills: 2 | Status: SHIPPED | OUTPATIENT
Start: 2024-11-13

## 2024-11-13 RX ORDER — SYRINGE WITH NEEDLE, 1 ML 25GX5/8"
SYRINGE, EMPTY DISPOSABLE MISCELLANEOUS
Qty: 12 EACH | Refills: 1 | Status: SHIPPED | OUTPATIENT
Start: 2024-11-13

## 2024-11-13 RX ORDER — BACLOFEN 10 MG/1
10 TABLET ORAL 3 TIMES DAILY
Qty: 270 TABLET | Refills: 0 | Status: SHIPPED | OUTPATIENT
Start: 2024-11-13

## 2024-11-13 NOTE — PROGRESS NOTES
Physical Therapy Daily Progress Note      Patient: Merlin Trujillo   : 1950  Diagnosis/ICD-10 Code:  Low back pain, unspecified back pain laterality, unspecified chronicity, unspecified whether sciatica present [M54.50]  Referring practitioner: Merlin William MD  Date of Initial Visit: Type: THERAPY  Noted: 2024  Today's Date: 2024  Patient seen for 19 sessions             Subjective Most of his back pain occurs when he walks any significant distance.      Objective   See Exercise, Manual, and Modality Logs for complete treatment.       Assessment/Plan  Pt tolerates well and has good effort with strengthening and balance exercises.     Progress per Plan of Care           Timed:           Therapeutic Exercise:    30     mins  52277;     Neuromuscular Isaac:    15    mins  66547;        Timed Treatment:   45   mins   Total Treatment:     45   mins        Merlin Rueda PTA  Physical Therapist Assistant

## 2024-11-19 ENCOUNTER — TREATMENT (OUTPATIENT)
Dept: PHYSICAL THERAPY | Facility: CLINIC | Age: 74
End: 2024-11-19
Payer: MEDICARE

## 2024-11-19 DIAGNOSIS — M54.50 LOW BACK PAIN, UNSPECIFIED BACK PAIN LATERALITY, UNSPECIFIED CHRONICITY, UNSPECIFIED WHETHER SCIATICA PRESENT: Primary | ICD-10-CM

## 2024-11-19 PROCEDURE — 97110 THERAPEUTIC EXERCISES: CPT | Performed by: PHYSICAL THERAPIST

## 2024-11-19 PROCEDURE — 97140 MANUAL THERAPY 1/> REGIONS: CPT | Performed by: PHYSICAL THERAPIST

## 2024-11-19 PROCEDURE — 97112 NEUROMUSCULAR REEDUCATION: CPT | Performed by: PHYSICAL THERAPIST

## 2024-11-19 NOTE — PROGRESS NOTES
Physical Therapy Daily Treatment Note  [facility] Physical Therapy  [address] [place] [address]     Patient: Merlin Trujillo   : 1950   Referring practitioner: Merlin William MD  Date of initial visit: Type: THERAPY  Noted: 2024   Today's date: 2024   Patient seen for 20 visits    Visit Diagnoses:    ICD-10-CM ICD-9-CM   1. Low back pain, unspecified back pain laterality, unspecified chronicity, unspecified whether sciatica present  M54.50 724.2       Subjective   Pt reports: Continues to be limited at the end of the day. HEP going well.      Objective     See Exercise, Manual, and Modality Logs for complete treatment.     Patient Education: HEP    Assessment/Plan Fatigued post visit, tolerating treatment well.      Progress per Plan of Care            Timed:         Manual Therapy:    15     mins  30801;     Therapeutic Exercise:    10     mins  78258;     Neuromuscular Isaac:    10    mins  84570;    Therapeutic Activity:          mins  20458;     Gait Training:           mins  62079;     Ultrasound:          mins  27787;    Ionto                                   mins   07145  Self Care                            mins   36241    Un-Timed:  Electrical Stimulation:         mins  55348 ( );  Traction          mins 75310  Low Eval          Mins  41672  Mod Eval          Mins  93300  High Eval                            Mins  65120  Re-Eval                               mins  68118    Timed Treatment:   35   mins   Total Treatment:     35   mins      Anthony Rooney PT, DPT, OCS  [place] license: [id number]  Physical Therapist

## 2024-11-20 ENCOUNTER — OFFICE VISIT (OUTPATIENT)
Dept: CARDIOLOGY | Facility: CLINIC | Age: 74
End: 2024-11-20
Payer: MEDICARE

## 2024-11-20 VITALS
BODY MASS INDEX: 28.34 KG/M2 | HEART RATE: 56 BPM | HEIGHT: 68 IN | WEIGHT: 187 LBS | SYSTOLIC BLOOD PRESSURE: 135 MMHG | OXYGEN SATURATION: 98 % | DIASTOLIC BLOOD PRESSURE: 75 MMHG

## 2024-11-20 DIAGNOSIS — I10 PRIMARY HYPERTENSION: Primary | ICD-10-CM

## 2024-11-20 DIAGNOSIS — I25.10 CHRONIC CORONARY ARTERY DISEASE: ICD-10-CM

## 2024-11-20 DIAGNOSIS — I65.23 BILATERAL CAROTID ARTERY STENOSIS: ICD-10-CM

## 2024-11-20 DIAGNOSIS — E78.2 MIXED HYPERLIPIDEMIA: ICD-10-CM

## 2024-11-20 PROCEDURE — 1159F MED LIST DOCD IN RCRD: CPT | Performed by: INTERNAL MEDICINE

## 2024-11-20 PROCEDURE — 3075F SYST BP GE 130 - 139MM HG: CPT | Performed by: INTERNAL MEDICINE

## 2024-11-20 PROCEDURE — 3078F DIAST BP <80 MM HG: CPT | Performed by: INTERNAL MEDICINE

## 2024-11-20 PROCEDURE — 1160F RVW MEDS BY RX/DR IN RCRD: CPT | Performed by: INTERNAL MEDICINE

## 2024-11-20 PROCEDURE — 99214 OFFICE O/P EST MOD 30 MIN: CPT | Performed by: INTERNAL MEDICINE

## 2024-11-20 NOTE — PROGRESS NOTES
Cardiology Office Visit      Encounter Date:  11/20/2024    Patient ID:   Merlin Trujillo is a 74 y.o. male.    Reason For Followup:  Peripheral artery disease  Carotid stenosis  Hypertension  Hyperlipidemia    Brief Clinical History:  Dear Dr. Peng, Reggie Duane, MD    I had the pleasure of seeing Merlin Trujillo today. As you are well aware, this is a 74 y.o. male with past medical history that is significant for history of hypertension hyperlipidemia  and peripheral arterial disease here for follow-up  Patient had symptoms of TIA with amaurosis fugax in the right eye.  Noted to have significant carotid stenosis in the right carotid artery  Status post a successful right carotid endarterectomy  Patient had recent hospitalization with urosepsis  Echocardiogram showed normal LV systolic function        Interval History:  Denies any chest pain  New cardiac symptoms  Denies any dizziness or syncope    Interpretation Summary    Myocardial perfusion imaging indicates a normal myocardial perfusion study with no evidence of ischemia.  Left ventricular ejection fraction is hyperdynamic (Calculated EF > 70%). .  Findings consistent with a normal ECG stress test.  Impressions are consistent with a low risk study.  There is no prior study available for comparison.    1. The patient has a history of prior right carotid surgery a few years ago. The degree of narrowing is less than 50% by consensus panel criteria in the right common carotid artery carotid bifurcation.  2. Mild to moderate plaque deposition left carotid bifurcation. The degree of narrowing is less than 50% by consensus panel criteria.  3. Patent vertebral arteries bilaterally with antegrade flow.       Assessment & Plan    Impressions:     Hypertension   hyperlipidemia   peripheral arterial disease   presumed coronary artery disease but no evidence of any inducible ischemia on the stress test    Normal postoperative appearance of the right internal carotid  "artery post carotid endarterectomy, with no recurrent plaque or stenosis.    Normal left carotid duplex scan.   Stress test in May 2021 with normal LV systolic function normal wall motion estimated LV ejection fraction of 70% with no inducible ischemia  Chronic renal insufficiency with improvement in the creatinine numbers compared to baseline  [place] hypertension    Recommendations:  Recent myocardial perfusion study that was normal   continued aggressive risk factor modification   regular exercise   labs with primary care physician office  Labs discussed with the patient  Recent labs reviewed and discussed with the patient  Medications reviewed and discussed with patient  Labs and medications reviewed and discussed the patient  Lipids from March reviewed and discussed with patient lipids are optimal including LDL cholesterol less than 70  Continue current medical therapy with aspirin 81 mg p.o. once a day atenolol 25 mg p.o. twice daily Lipitor 40 mg p.o. once a day  Close monitoring of blood pressure at home  Recent carotid ultrasound study from December 2023 with normal postoperative findings involving the right side and no significant stenosis involving the left internal carotid artery  Consider repeat carotid ultrasound study next year  Home blood pressure monitoring  Recent labs reviewed and discussed with patient  Close monitoring of renal function with primary care physician office  Follow up in office in 6 months        Vitals:  Vitals:    11/20/24 1335   BP: 135/75   Pulse: 56   SpO2: 98%   Weight: 84.8 kg (187 lb)   Height: 172.7 cm (68\")       Physical Exam:    General: Alert, cooperative, no distress, appears stated age  Head:  Normocephalic, atraumatic, mucous membranes moist  Eyes:  Conjunctiva/corneas clear, EOM's intact     Neck:  Supple,  no adenopathy;      Lungs: Clear to auscultation bilaterally, no wheezes rhonchi rales are noted  Chest wall: No tenderness  Heart::  Regular rate and rhythm, " S1 and S2 normal, no murmur, rub or gallop  Abdomen: Soft, non-tender, nondistended bowel sounds active  Extremities: No cyanosis, clubbing, or edema  Pulses: 2+ and symmetric all extremities  Skin:  No rashes or lesions  Neuro/psych: A&O x3. CN II through XII are grossly intact with appropriate affect              Lab Results   Component Value Date    GLUCOSE 107 (H) 10/29/2024    BUN 20 10/29/2024    CREATININE 1.08 10/29/2024    EGFRRESULT 58 (L) 03/03/2022    EGFR 72.0 10/29/2024    BCR 18.5 10/29/2024    K 4.2 10/29/2024    CO2 27.0 10/29/2024    CALCIUM 9.0 10/29/2024    PROTENTOTREF 7.8 03/03/2022    ALBUMIN 4.4 10/29/2024    BILITOT 0.6 10/29/2024    AST 17 10/29/2024    ALT 19 10/29/2024        No results found for this or any previous visit.     Lab Results   Component Value Date    CHOL 125 10/29/2024    CHLPL 147 03/03/2022    TRIG 105 10/29/2024    HDL 52 10/29/2024    LDL 54 10/29/2024      Results for orders placed during the hospital encounter of 05/19/21    Stress Test With Myocardial Perfusion One Day    Interpretation Summary  · Myocardial perfusion imaging indicates a normal myocardial perfusion study with no evidence of ischemia.  · Left ventricular ejection fraction is hyperdynamic (Calculated EF > 70%). .  · Findings consistent with a normal ECG stress test.  · Impressions are consistent with a low risk study.  · There is no prior study available for comparison.   Results for orders placed in visit on 04/10/17    CARDIOVASCULAR STUDIES - CONVERTED    Narrative  Nuclear Stress Test Preliminary      Imported By: Daphne Bansal MA 4/10/2017 4:47:10 PM    _____________________________________________________________________    External Attachment:    Type: Image  Comment:  External Document      Electronically signed by Yury Iraheta MD on 04/11/2017 at 8:58 AM  ________________________________________________________________________      Disclaimer: Converted Note message may not contain  "all data elements that existed in the legacy source system. Please see [facility] for the original note details.           Objective:          Allergies:  No Known Allergies    Medication Review:     Current Outpatient Medications:     Accu-Chek FastClix Lancets misc, Test blood sugars once day, Disp: 102 each, Rfl: 0    amLODIPine (NORVASC) 5 MG tablet, TAKE ONE TABLET BY MOUTH TWICE DAILY, Disp: 180 tablet, Rfl: 0    aspirin 81 MG EC tablet, Take 1 tablet by mouth Daily., Disp: , Rfl:     atenolol (TENORMIN) 50 MG tablet, TAKE 1/2 TABLET BY MOUTH TWICE DAILY, Disp: 90 tablet, Rfl: 0    atorvastatin (LIPITOR) 40 MG tablet, TAKE ONE TABLET BY MOUTH EVERY NIGHT AT BEDTIME, Disp: 90 tablet, Rfl: 0    B-D 3CC LUER-RONY SYR 25GX1\" 25G X 1\" 3 ML misc, USE AS DIRECTED WITH B-12 INJECTION, Disp: 12 each, Rfl: 1    baclofen (LIORESAL) 10 MG tablet, TAKE ONE TABLET BY MOUTH THREE TIMES DAILY, Disp: 270 tablet, Rfl: 0    cholecalciferol (VITAMIN D3) 10 MCG (400 UNIT) tablet, Take 1 tablet by mouth Daily. 1000 units, Disp: , Rfl:     cyanocobalamin 1000 MCG/ML injection, INJECT 1ML INTO THE APPROPRIATE MUSCLE AS DIRECTED BY PRESCRIBER ONE TIME FOR ONE DOSE, Disp: 1 mL, Rfl: 0    gabapentin (NEURONTIN) 600 MG tablet, Take 1 tablet by mouth 4 (Four) Times a Day As Needed (pain)., Disp: 120 tablet, Rfl: 5    glipizide (GLUCOTROL) 10 MG tablet, TAKE ONE TABLET BY MOUTH TWICE DAILY BEFORE MEALS, Disp: 180 tablet, Rfl: 0    glucose blood (Accu-Chek Guide) test strip, Test blood sugars once a day., Disp: 100 each, Rfl: 3    lubiprostone (AMITIZA) 24 MCG capsule, TAKE 1 CAPSULE BY MOUTH TWICE DAILY, Disp: 180 capsule, Rfl: 2    metFORMIN (GLUCOPHAGE) 500 MG tablet, TAKE 1 TABLET BY MOUTH 2 TIMES A DAY WITH MEALS, Disp: 180 tablet, Rfl: 0    Morphine (MS CONTIN) 15 MG 12 hr tablet, Take 1 tablet by mouth 3 (Three) Times a Day As Needed for Severe Pain., Disp: 90 tablet, Rfl: 0    multivitamin (THERAGRAN) tablet " "tablet, Take  by mouth Daily. Calcium+magnesium+zinc \"Sometimes\", Disp: , Rfl:     pentoxifylline (TRENtal) 400 MG CR tablet, TAKE ONE TABLET BY MOUTH THREE TIMES DAILY, Disp: 270 tablet, Rfl: 0    Family History:  Family History   Problem Relation Age of Onset    Dementia Mother     Heart disease Mother     COPD Father     Stroke Father     Heart disease Father     Hypertension Sister     Diabetes Sister     Hypertension Brother        Past Medical History:  Past Medical History:   Diagnosis Date    Adenomatous polyps     Amaurosis fugax 3/27/2017    Arm pain     left into hand    Arm pain     Arm pain     left--- wk comp --injury 6/15/2015    Blood in urine     3/2020    BPH (benign prostatic hyperplasia)     CAD (coronary artery disease)     CKD (chronic kidney disease) stage 3, GFR 30-59 ml/min     CVA, old, speech/language deficit     Diabetes     Diabetic acetonemia     Hearing loss     Hematuria     Hernia, inguinal     Hyperlipidemia     Hypertension     Kidney stones     Low back pain     Neuropathic pain     Obstructive uropathy     Shoulder pain     Shoulder pain     Sleep disorder     Spondylosis, cervical     Urgency of urination        Past surgical History:  Past Surgical History:   Procedure Laterality Date    ANKLE SURGERY      replacement  rt    CAROTID ARTERY ANGIOPLASTY      CARPAL TUNNEL RELEASE      COLONOSCOPY      CYSTOSCOPY URETEROSCOPY LASER LITHOTRIPSY      kidney stones    EYE SURGERY      mesh  and plate under rt eye due to orbital fx    NOSE SURGERY      x2    OTHER SURGICAL HISTORY      uro  lift  1 /2021    TEETH EXTRACTION      dentures       Social History:  Social History     Socioeconomic History    Marital status: Single   Tobacco Use    Smoking status: Never     Passive exposure: Past    Smokeless tobacco: Never   Vaping Use    Vaping status: Never Used   Substance and Sexual Activity    Alcohol use: No    Drug use: Never    Sexual activity: Defer       Review of Systems:  The " following systems were reviewed as they relate to the cardiovascular system: Constitutional, Eyes, ENT, Cardiovascular, Respiratory, Gastrointestinal, Integumentary, Neurological, Psychiatric, Hematologic, Endocrine, Musculoskeletal, and Genitourinary. The pertinent cardiovascular findings are reported above with all other cardiovascular points within those systems being negative.    Diagnostic Study Review:     Current Electrocardiogram:  Procedures          NOTE: The following portions of the patient's history were reviewed and updated this visit as appropriate: allergies, current medications, past family history, past medical history, past social history, past surgical history and problem list.

## 2024-11-21 ENCOUNTER — TREATMENT (OUTPATIENT)
Dept: PHYSICAL THERAPY | Facility: CLINIC | Age: 74
End: 2024-11-21
Payer: MEDICARE

## 2024-11-21 DIAGNOSIS — M54.50 LOW BACK PAIN, UNSPECIFIED BACK PAIN LATERALITY, UNSPECIFIED CHRONICITY, UNSPECIFIED WHETHER SCIATICA PRESENT: Primary | ICD-10-CM

## 2024-11-21 NOTE — PROGRESS NOTES
Physical Therapy Daily Progress Note      Patient: Merlin Trujillo   : 1950  Diagnosis/ICD-10 Code:  Low back pain, unspecified back pain laterality, unspecified chronicity, unspecified whether sciatica present [M54.50]  Referring practitioner: Merlin William MD  Date of Initial Visit: Type: THERAPY  Noted: 2024  Today's Date: 2024  Patient seen for 21 sessions             Subjective Pt has been out of his pain med and just got it refilled this morning.  As a result, he did not sleep well last night.    Objective   See Exercise, Manual, and Modality Logs for complete treatment.       Assessment/Plan  Pt's tolerance to exercises today was not as good due to not having his pain meds.  He also had increased c/o soreness with right hip flexor stretch.  Responded well to stim and MH.    Progress per Plan of Care           Timed:         Manual Therapy:    15     mins  59177;     Therapeutic Exercise:    15     mins  66576;     Neuromuscular Isaac:    10    mins  24925;          Un-Timed:  Electrical Stimulation:    15     mins  45401 ( );      Timed Treatment:   40   mins   Total Treatment:     55   mins        Merlin Rueda PTA  Physical Therapist Assistant

## 2024-11-22 DIAGNOSIS — M79.2 NEUROPATHIC PAIN: ICD-10-CM

## 2024-11-22 DIAGNOSIS — G89.4 CHRONIC PAIN SYNDROME: ICD-10-CM

## 2024-11-22 RX ORDER — GABAPENTIN 600 MG/1
TABLET ORAL
Qty: 120 TABLET | Refills: 4 | OUTPATIENT
Start: 2024-11-22

## 2024-11-23 DIAGNOSIS — G89.4 CHRONIC PAIN SYNDROME: ICD-10-CM

## 2024-11-23 DIAGNOSIS — M79.2 NEUROPATHIC PAIN: ICD-10-CM

## 2024-11-25 ENCOUNTER — TELEPHONE (OUTPATIENT)
Dept: PAIN MEDICINE | Facility: CLINIC | Age: 74
End: 2024-11-25
Payer: MEDICARE

## 2024-11-25 DIAGNOSIS — G89.4 CHRONIC PAIN SYNDROME: ICD-10-CM

## 2024-11-25 DIAGNOSIS — M79.2 NEUROPATHIC PAIN: ICD-10-CM

## 2024-11-25 RX ORDER — GABAPENTIN 600 MG/1
TABLET ORAL
Qty: 120 TABLET | Refills: 0 | Status: SHIPPED | OUTPATIENT
Start: 2024-11-25

## 2024-11-25 RX ORDER — GABAPENTIN 600 MG/1
TABLET ORAL
Qty: 120 TABLET | Refills: 0 | Status: CANCELLED | OUTPATIENT
Start: 2024-11-25

## 2024-11-25 NOTE — TELEPHONE ENCOUNTER
Caller: Merlin rTujillo    Relationship: Self    Best call back number: [phone]      Requested Prescriptions:   Requested Prescriptions     Pending Prescriptions Disp Refills    gabapentin (NEURONTIN) 600 MG tablet 120 tablet 0        Pharmacy where request should be sent: [facility] - [place], [place] - [address]  [phone]  - [phone]     Last office visit with prescribing clinician: 10/14/2024   Last telemedicine visit with prescribing clinician: Visit date not found   Next office visit with prescribing clinician: 12/9/2024         Does the patient have less than a 3 day supply:  [x] Yes  [] No    Would you like a call back once the refill request has been completed: [x] Yes [] No    If the office needs to give you a call back, can they leave a voicemail: [x] Yes [] No    Niya Wild Rep   11/25/24 12:15 EST

## 2024-11-26 ENCOUNTER — TREATMENT (OUTPATIENT)
Dept: PHYSICAL THERAPY | Facility: CLINIC | Age: 74
End: 2024-11-26
Payer: MEDICARE

## 2024-11-26 DIAGNOSIS — M54.50 LOW BACK PAIN, UNSPECIFIED BACK PAIN LATERALITY, UNSPECIFIED CHRONICITY, UNSPECIFIED WHETHER SCIATICA PRESENT: Primary | ICD-10-CM

## 2024-11-26 PROCEDURE — 97112 NEUROMUSCULAR REEDUCATION: CPT | Performed by: PHYSICAL THERAPIST

## 2024-11-26 PROCEDURE — 97140 MANUAL THERAPY 1/> REGIONS: CPT | Performed by: PHYSICAL THERAPIST

## 2024-11-26 PROCEDURE — 97110 THERAPEUTIC EXERCISES: CPT | Performed by: PHYSICAL THERAPIST

## 2024-11-26 NOTE — PROGRESS NOTES
Re-Assessment / Re-Certification        Patient: Merlin Trujillo   : 1950  Diagnosis/ICD-10 Code:  Low back pain, unspecified back pain laterality, unspecified chronicity, unspecified whether sciatica present [M54.50]  Referring practitioner: Merlin William MD  Date of Initial Visit: Type: THERAPY  Noted: 2024  Today's Date: 2024  Patient seen for 22 sessions      Subjective:     Subjective Questionnaire: Oswestry: 34%  Clinical Progress: improved  Home Program Compliance: Yes  Treatment has included: therapeutic exercise, neuromuscular re-education, manual therapy, therapeutic activity, and gait training    Subjective Pt rates his worst pain at 7/10 and feels activity tolerance is slowly improving. Having difficulty stooping down to feed his chickens. Pt feels his HEP is going well.   Objective   Active Range of Motion      Lumbar   Normal active range of motion  Extension: with pain  Right lateral flexion: without pain     Strength/Myotome Testing      Left Hip   Planes of Motion   Flexion: 4+     Right Hip   Planes of Motion   Flexion: 4+     Additional Strength Details  LE strength 5/5 otherwise      Additional Tests Details  Further provocative testing is negative    Tinetti /28  TUG 11.2 seconds  Five times sit to stand 15.8 seconds  Four square step test 13 seconds  Assessment/Plan  Pt continues to make slow progress. He is still having difficulty with stooping and is still limited with his five times sit to stand test. Recommend continuing with PT evaluation and treatment until pt fails to improve or he meets his therapy goals.    Short term goals, 1 week: Tolerate HEP progression. met  Voice compliance with activity modification. met  Report improvement in symptoms. met     Long term goals, 5 weeks: Improve YAMINI score to 38%. met  AROM to be wfl and non provocative with testing. met  4+/5 hip flexion strength to facilitate swing phase of gait. met  Improve TUG time to 14 seconds.  met  Improve five times sit to stand time to 13 seconds.progressing  Improve four square step test to 14 seconds. met  Independent with HEP. progressing    Continue BIW for 5 weeks    Anthony Rooney, PT, DPT, OCS  [place] license: [id number]  Physical Therapist      Timed:         Manual Therapy:  25      mins  26253;     Therapeutic Exercise:    8    mins  37994;     Neuromuscular Isaac:    8    mins  90993;    Therapeutic Activity:         mins  89950;     Gait Training:           mins  99757;     Ultrasound:          mins  56491;    Ionto                                   mins   51348  Self Care                            mins   87138    Un-Timed:  Electrical Stimulation:         mins  00842 ( );  Traction          mins 03829  Low Eval          Mins  05058  Mod Eval          Mins  94104  High Eval                            Mins  85567  Re-Eval                               mins  32342      Timed Treatment:   41mins   Total Treatment:     41 mins

## 2024-11-26 NOTE — LETTER
Re-Assessment / Re-Certification        Patient: Merlin Trujillo   : 1950  Diagnosis/ICD-10 Code:  Low back pain, unspecified back pain laterality, unspecified chronicity, unspecified whether sciatica present [M54.50]  Referring practitioner: Merlin William MD  Date of Initial Visit: Type: THERAPY  Noted: 2024  Today's Date: 2024  Patient seen for 22 sessions      Subjective:     Subjective Questionnaire: Oswestry: 34%  Clinical Progress: improved  Home Program Compliance: Yes  Treatment has included: therapeutic exercise, neuromuscular re-education, manual therapy, therapeutic activity, and gait training    Subjective Pt rates his worst pain at 7/10 and feels activity tolerance is slowly improving. Having difficulty stooping down to feed his chickens. Pt feels his HEP is going well.   Objective   Active Range of Motion      Lumbar   Normal active range of motion  Extension: with pain  Right lateral flexion: without pain     Strength/Myotome Testing      Left Hip   Planes of Motion   Flexion: 4+     Right Hip   Planes of Motion   Flexion: 4+     Additional Strength Details  LE strength 5/5 otherwise      Additional Tests Details  Further provocative testing is negative    Tinetti /28  TUG 11.2 seconds  Five times sit to stand 15.8 seconds  Four square step test 13 seconds  Assessment/Plan  Pt continues to make slow progress. He is still having difficulty with stooping and is still limited with his five times sit to stand test. Recommend continuing with PT evaluation and treatment until pt fails to improve or he meets his therapy goals.    Short term goals, 1 week: Tolerate HEP progression. met  Voice compliance with activity modification. met  Report improvement in symptoms. met     Long term goals, 5 weeks: Improve YAMINI score to 38%. met  AROM to be wfl and non provocative with testing. met  4+/5 hip flexion strength to facilitate swing phase of gait. met  Improve TUG time to 14  seconds. met  Improve five times sit to stand time to 13 seconds.progressing  Improve four square step test to 14 seconds. met  Independent with HEP. progressing    Continue BIW for 5 weeks    Anthony Rooney, PT, DPT, OCS  [place] license: [id number]  Physical Therapist      Timed:         Manual Therapy:  25      mins  25727;     Therapeutic Exercise:    8    mins  81564;     Neuromuscular Isaac:    8    mins  32254;    Therapeutic Activity:         mins  13924;     Gait Training:           mins  29504;     Ultrasound:          mins  02527;    Ionto                                   mins   02161  Self Care                            mins   40139    Un-Timed:  Electrical Stimulation:         mins  92746 ( );  Traction          mins 40034  Low Eval          Mins  95387  Mod Eval          Mins  30385  High Eval                            Mins  62826  Re-Eval                               mins  07849      Timed Treatment:   41mins   Total Treatment:     41 mins

## 2024-12-04 ENCOUNTER — TELEPHONE (OUTPATIENT)
Dept: PHYSICAL THERAPY | Facility: CLINIC | Age: 74
End: 2024-12-04
Payer: MEDICARE

## 2024-12-04 NOTE — TELEPHONE ENCOUNTER
Called for peer to peer discussion as per Paresh's fax message received 12/4/24. I was put on hold for an extended time without Paresh answering and I ended this due to time constraints. I was unable to leave a message as there is no option to do so.

## 2024-12-09 ENCOUNTER — OFFICE VISIT (OUTPATIENT)
Dept: PAIN MEDICINE | Facility: CLINIC | Age: 74
End: 2024-12-09
Payer: OTHER MISCELLANEOUS

## 2024-12-09 VITALS
RESPIRATION RATE: 16 BRPM | DIASTOLIC BLOOD PRESSURE: 81 MMHG | OXYGEN SATURATION: 96 % | WEIGHT: 186 LBS | SYSTOLIC BLOOD PRESSURE: 143 MMHG | BODY MASS INDEX: 28.28 KG/M2 | HEART RATE: 56 BPM

## 2024-12-09 DIAGNOSIS — M79.2 NEUROPATHIC PAIN: ICD-10-CM

## 2024-12-09 DIAGNOSIS — G89.29 CHRONIC LEFT SHOULDER PAIN: ICD-10-CM

## 2024-12-09 DIAGNOSIS — Z79.899 HIGH RISK MEDICATION USE: ICD-10-CM

## 2024-12-09 DIAGNOSIS — G89.4 CHRONIC PAIN SYNDROME: Primary | ICD-10-CM

## 2024-12-09 DIAGNOSIS — M47.817 LUMBOSACRAL SPONDYLOSIS WITHOUT MYELOPATHY: ICD-10-CM

## 2024-12-09 DIAGNOSIS — M25.512 CHRONIC LEFT SHOULDER PAIN: ICD-10-CM

## 2024-12-09 DIAGNOSIS — M47.812 CERVICAL SPONDYLOSIS WITHOUT MYELOPATHY: ICD-10-CM

## 2024-12-09 RX ORDER — MORPHINE SULFATE 15 MG/1
15 TABLET, FILM COATED, EXTENDED RELEASE ORAL 3 TIMES DAILY PRN
Qty: 90 TABLET | Refills: 0 | Status: SHIPPED | OUTPATIENT
Start: 2025-01-19

## 2024-12-09 RX ORDER — MORPHINE SULFATE 15 MG/1
15 TABLET, FILM COATED, EXTENDED RELEASE ORAL 3 TIMES DAILY PRN
Qty: 90 TABLET | Refills: 0 | Status: SHIPPED | OUTPATIENT
Start: 2024-12-20

## 2024-12-10 ENCOUNTER — TELEPHONE (OUTPATIENT)
Dept: FAMILY MEDICINE CLINIC | Facility: CLINIC | Age: 74
End: 2024-12-10
Payer: MEDICARE

## 2024-12-10 NOTE — TELEPHONE ENCOUNTER
Called patient to see if he authorized [facility] to refill his diabetic supplies.   He did not authorize this.   All prescriptions go to [facility] in [facility] [place].

## 2024-12-11 RX ORDER — ATORVASTATIN CALCIUM 40 MG/1
TABLET, FILM COATED ORAL
Qty: 90 TABLET | Refills: 0 | Status: SHIPPED | OUTPATIENT
Start: 2024-12-11

## 2024-12-11 RX ORDER — AMLODIPINE BESYLATE 5 MG/1
TABLET ORAL
Qty: 180 TABLET | Refills: 0 | Status: SHIPPED | OUTPATIENT
Start: 2024-12-11

## 2024-12-11 RX ORDER — CYANOCOBALAMIN 1000 UG/ML
INJECTION, SOLUTION INTRAMUSCULAR; SUBCUTANEOUS
Qty: 1 ML | Refills: 0 | Status: SHIPPED | OUTPATIENT
Start: 2024-12-11

## 2024-12-11 RX ORDER — ATENOLOL 50 MG/1
TABLET ORAL
Qty: 90 TABLET | Refills: 0 | Status: SHIPPED | OUTPATIENT
Start: 2024-12-11
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
## 2024-12-16 RX ORDER — LUBIPROSTONE 24 UG/1
CAPSULE ORAL
Qty: 180 CAPSULE | Refills: 2 | OUTPATIENT
Start: 2024-12-16

## 2025-01-06 RX ORDER — BACLOFEN 10 MG/1
10 TABLET ORAL 3 TIMES DAILY
Qty: 270 TABLET | Refills: 0 | Status: SHIPPED | OUTPATIENT
Start: 2025-01-06

## 2025-01-17 ENCOUNTER — TELEPHONE (OUTPATIENT)
Dept: PAIN MEDICINE | Facility: CLINIC | Age: 75
End: 2025-01-17
Payer: MEDICARE

## 2025-01-17 ENCOUNTER — TELEPHONE (OUTPATIENT)
Dept: FAMILY MEDICINE CLINIC | Facility: CLINIC | Age: 75
End: 2025-01-17
Payer: MEDICARE

## 2025-01-17 RX ORDER — ATENOLOL 50 MG/1
25 TABLET ORAL DAILY
Qty: 90 TABLET | Refills: 0 | Status: SHIPPED | OUTPATIENT
Start: 2025-01-17

## 2025-01-17 NOTE — TELEPHONE ENCOUNTER
Caller: Merlin Trujillo    Relationship to patient: Self    Best call back number: [phone]    Patient is needing: PATIENT WANTS TO KNOW WHERE TO GO ON 1/22/25 FOR PROCEDURE. PLEASE ADVISE ASAP

## 2025-01-17 NOTE — TELEPHONE ENCOUNTER
Caller: Merlin Trujillo    Relationship: Self    Best call back number: [phone]    Requested Prescriptions:   Requested Prescriptions     Pending Prescriptions Disp Refills    atenolol (TENORMIN) 50 MG tablet 90 tablet 0     Sig: Take 0.5 tablets by mouth Daily.        Pharmacy where request should be sent: [id number] - [place] - [address] [facility] [address] [facility] [address] - [phone]  - [phone] FX     Last office visit with prescribing clinician: 8/26/2024   Last telemedicine visit with prescribing clinician: Visit date not found   Next office visit with prescribing clinician: 2/24/2025     Additional details provided by patient:     ANY OTHER MEDICATIONS THE PATIENT IS DUE FOR TO HAVE REFILLED HE WOULD LIKE US TO SEND TO ANTONY Sahu, PCT   01/17/25 10:31 EST

## 2025-01-22 ENCOUNTER — HOSPITAL ENCOUNTER (OUTPATIENT)
Dept: PAIN MEDICINE | Facility: HOSPITAL | Age: 75
Discharge: HOME | End: 2025-01-22
Payer: MEDICARE

## 2025-01-22 VITALS
HEIGHT: 68 IN | HEART RATE: 56 BPM | TEMPERATURE: 96.9 F | SYSTOLIC BLOOD PRESSURE: 142 MMHG | DIASTOLIC BLOOD PRESSURE: 79 MMHG | WEIGHT: 186 LBS | RESPIRATION RATE: 16 BRPM | BODY MASS INDEX: 28.19 KG/M2 | OXYGEN SATURATION: 97 %

## 2025-01-22 DIAGNOSIS — R52 PAIN: ICD-10-CM

## 2025-01-22 DIAGNOSIS — M47.817 LUMBOSACRAL SPONDYLOSIS WITHOUT MYELOPATHY: Primary | ICD-10-CM

## 2025-01-22 PROCEDURE — 77003 FLUOROGUIDE FOR SPINE INJECT: CPT

## 2025-01-22 PROCEDURE — 25010000002 BUPIVACAINE (PF) 0.25 % SOLUTION: Performed by: ANESTHESIOLOGY

## 2025-01-22 PROCEDURE — 25010000002 LIDOCAINE PF 1% 1 % SOLUTION: Performed by: ANESTHESIOLOGY

## 2025-01-22 PROCEDURE — 25510000001 IOPAMIDOL 41 % SOLUTION: Performed by: ANESTHESIOLOGY

## 2025-01-22 RX ORDER — BUPIVACAINE HYDROCHLORIDE 2.5 MG/ML
10 INJECTION, SOLUTION EPIDURAL; INFILTRATION; INTRACAUDAL ONCE
Status: COMPLETED | OUTPATIENT
Start: 2025-01-22 | End: 2025-01-22

## 2025-01-22 RX ORDER — LIDOCAINE HYDROCHLORIDE 10 MG/ML
5 INJECTION, SOLUTION EPIDURAL; INFILTRATION; INTRACAUDAL; PERINEURAL ONCE
Status: COMPLETED | OUTPATIENT
Start: 2025-01-22 | End: 2025-01-22

## 2025-01-22 RX ORDER — IOPAMIDOL 408 MG/ML
3 INJECTION, SOLUTION INTRATHECAL
Status: COMPLETED | OUTPATIENT
Start: 2025-01-22 | End: 2025-01-22

## 2025-01-22 RX ADMIN — IOPAMIDOL 3 ML: 408 INJECTION, SOLUTION INTRATHECAL at 13:24

## 2025-01-22 RX ADMIN — BUPIVACAINE HYDROCHLORIDE 10 ML: 2.5 INJECTION, SOLUTION EPIDURAL; INFILTRATION; INTRACAUDAL; PERINEURAL at 13:25

## 2025-01-22 RX ADMIN — LIDOCAINE HYDROCHLORIDE 5 ML: 10 INJECTION, SOLUTION EPIDURAL; INFILTRATION; INTRACAUDAL; PERINEURAL at 13:21

## 2025-01-22 NOTE — TELEPHONE ENCOUNTER
Caller: Merlin Trujillo    Relationship: Self    Best call back number: Work phone: [phone] .      Requested Prescriptions:   Requested Prescriptions     Pending Prescriptions Disp Refills    glipizide (GLUCOTROL) 10 MG tablet 180 tablet 0     Sig: Take 1 tablet by mouth 2 (Two) Times a Day Before Meals.        Pharmacy where request should be sent: [id number] [place] [place] [facility] [address] [facility] [address] [facility] - [phone]  - [phone] FX     Last office visit with prescribing clinician: 8/26/2024   Last telemedicine visit with prescribing clinician: Visit date not found   Next office visit with prescribing clinician: 2/24/2025     Additional details provided by patient:     Does the patient have less than a 3 day supply:  [x] Yes  [] No    Would you like a call back once the refill request has been completed: [] Yes [] No    If the office needs to give you a call back, can they leave a voicemail: [] Yes [] No    Niya Nova Rep   01/22/25 12:54 EST

## 2025-01-23 ENCOUNTER — TELEPHONE (OUTPATIENT)
Dept: PAIN MEDICINE | Facility: HOSPITAL | Age: 75
End: 2025-01-23
Payer: MEDICARE

## 2025-01-23 RX ORDER — GLIPIZIDE 10 MG/1
10 TABLET ORAL
Qty: 180 TABLET | Refills: 0 | Status: SHIPPED | OUTPATIENT
Start: 2025-01-23

## 2025-01-23 NOTE — TELEPHONE ENCOUNTER
Post procedure phone call completed.  Pt states they are doing good and denies questions or concerns.  Patient reports pain level a #3 with 50% pain relief.  
independent

## 2025-01-23 NOTE — PROCEDURES
"Subjective    CC back  pain  Merlin Trujillo is a 74 y.o. male lumbar stenosis, lumbar spondylosis here for bilateral lumbar medial branch block at L4/5, L5/S1.  No anticoagulation    Pain Assessment   Location of Pain: Lower Back, R Hip, L Hip, right leg   description of Pain: Dull/Aching, Throbbing, Stabbing  Previous Pain Rating :4  Current Pain Ratin  Aggravating Factors: Activity  Alleviating Factors: Rest, Medication    The following portions of the patient's history were reviewed and updated as appropriate: allergies, current medications, past family history, past medical history, past social history, past surgical history and problem list.  Review of Systems  As in HPI  Objective   Physical Exam  Vitals reviewed.   Constitutional:       General: He is not in acute distress.  Pulmonary:      Effort: Pulmonary effort is normal.       /79 (BP Location: Left arm, Patient Position: Sitting)   Pulse 56   Temp 96.9 °F (36.1 °C) (Skin)   Resp 16   Ht 172.7 cm (68\")   Wt 84.4 kg (186 lb)   SpO2 97%   BMI 28.28 kg/m²     Assessment & Plan    underwent  bilateral lumbar medial branch block at L4/5, L5/S1.  Reported 90% relief 15 minutes after the procedure..    RTC 4-6 weeks or as needed for repeat    DATE OF PROCEDURE: 2025    PREOPERATIVE DIAGNOSIS: Lumbar spondylosis without myelopathy    POSTOPERATIVE DIAGNOSIS: same    PROCEDURE PERFORMED: Jomar Lumbar Medial Branch Block at L4/L5, L5/S1.     The patient presents with a history of lumbosacral spondylosis bilaterally at level  [ L4/L5 ] [ L5/S1].   The patient understands the risks and benefits of the procedure and wishes to proceed. The patient was seen in the preoperative area.  Patient's consent was obtained and updated.  Vitals were taken.  Patient was then brought to the procedure suite and placed in a prone position. The appropriate anatomic area was widely prepped with Chloroprep and draped in a sterile fashion.  Noninvasive monitoring " per routine anesthesia protocol was placed.  Under fluoroscopic guidance an AP view with caudad cephaled tilt was obtained. A 22 gauge curved tip spinal needle was passed through skin anesthetized with 1% Lidocaine without epinephrine. The needle tip was guided into the superior medial aspect of the transverse process at  [ L4 ] [ L5 ] [ sacral ala ].  Next 0.25 mL of  preservative free contrast were injected.   At this point 0.5 mL of 0.25% bupivacaine was injected. A sterile dressing was placed over the puncture site. The patient tolerated the procedure with  no complications. They were then brought to the post procedure area where they recovered nicely.    Reported 90% relief 15 to 20 minutes after the procedure.

## 2025-01-24 DIAGNOSIS — G89.4 CHRONIC PAIN SYNDROME: ICD-10-CM

## 2025-01-24 DIAGNOSIS — M79.2 NEUROPATHIC PAIN: ICD-10-CM

## 2025-01-24 RX ORDER — GABAPENTIN 600 MG/1
TABLET ORAL
Qty: 120 TABLET | Refills: 0 | Status: SHIPPED | OUTPATIENT
Start: 2025-01-24

## 2025-02-07 DIAGNOSIS — G89.4 CHRONIC PAIN SYNDROME: ICD-10-CM

## 2025-02-10 RX ORDER — MORPHINE SULFATE 15 MG/1
15 TABLET, FILM COATED, EXTENDED RELEASE ORAL 3 TIMES DAILY PRN
Qty: 90 TABLET | Refills: 0 | Status: SHIPPED | OUTPATIENT
Start: 2025-02-10

## 2025-02-12 ENCOUNTER — HOSPITAL ENCOUNTER (OUTPATIENT)
Dept: PAIN MEDICINE | Facility: HOSPITAL | Age: 75
Discharge: HOME | End: 2025-02-12
Payer: MEDICARE

## 2025-02-12 VITALS
BODY MASS INDEX: 28.19 KG/M2 | HEART RATE: 58 BPM | OXYGEN SATURATION: 97 % | WEIGHT: 186 LBS | SYSTOLIC BLOOD PRESSURE: 152 MMHG | RESPIRATION RATE: 16 BRPM | DIASTOLIC BLOOD PRESSURE: 76 MMHG | HEIGHT: 68 IN | TEMPERATURE: 97.3 F

## 2025-02-12 DIAGNOSIS — R52 PAIN: ICD-10-CM

## 2025-02-12 DIAGNOSIS — M47.817 LUMBOSACRAL SPONDYLOSIS WITHOUT MYELOPATHY: Primary | ICD-10-CM

## 2025-02-12 PROCEDURE — 25510000001 IOPAMIDOL 41 % SOLUTION: Performed by: ANESTHESIOLOGY

## 2025-02-12 PROCEDURE — 25010000002 BUPIVACAINE (PF) 0.25 % SOLUTION: Performed by: ANESTHESIOLOGY

## 2025-02-12 PROCEDURE — 25010000002 LIDOCAINE PF 1% 1 % SOLUTION: Performed by: ANESTHESIOLOGY

## 2025-02-12 PROCEDURE — 77003 FLUOROGUIDE FOR SPINE INJECT: CPT

## 2025-02-12 RX ORDER — LIDOCAINE HYDROCHLORIDE 10 MG/ML
5 INJECTION, SOLUTION EPIDURAL; INFILTRATION; INTRACAUDAL; PERINEURAL ONCE
Status: COMPLETED | OUTPATIENT
Start: 2025-02-12 | End: 2025-02-12

## 2025-02-12 RX ORDER — IOPAMIDOL 408 MG/ML
3 INJECTION, SOLUTION INTRATHECAL
Status: COMPLETED | OUTPATIENT
Start: 2025-02-12 | End: 2025-02-12

## 2025-02-12 RX ORDER — BUPIVACAINE HYDROCHLORIDE 2.5 MG/ML
10 INJECTION, SOLUTION EPIDURAL; INFILTRATION; INTRACAUDAL ONCE
Status: COMPLETED | OUTPATIENT
Start: 2025-02-12 | End: 2025-02-12

## 2025-02-12 RX ADMIN — IOPAMIDOL 3 ML: 408 INJECTION, SOLUTION INTRATHECAL at 13:42

## 2025-02-12 RX ADMIN — BUPIVACAINE HYDROCHLORIDE 10 ML: 2.5 INJECTION, SOLUTION EPIDURAL; INFILTRATION; INTRACAUDAL; PERINEURAL at 13:43

## 2025-02-12 RX ADMIN — LIDOCAINE HYDROCHLORIDE 5 ML: 10 INJECTION, SOLUTION EPIDURAL; INFILTRATION; INTRACAUDAL; PERINEURAL at 13:38

## 2025-02-12 NOTE — PROCEDURES
"Subjective    CC back  pain  Merlin Trujillo is a 75 y.o. male lumbar stenosis, repeat lumbar spondylosis here for bilateral lumbar medial branch block at L4/5, L5/S1.  No anticoagulation    Pain Assessment   Location of Pain: Lower Back, R Hip, L Hip, right leg   description of Pain: Dull/Aching, Throbbing, Stabbing  Previous Pain Rating :4  Current Pain Ratin  Aggravating Factors: Activity  Alleviating Factors: Rest, Medication    The following portions of the patient's history were reviewed and updated as appropriate: allergies, current medications, past family history, past medical history, past social history, past surgical history and problem list.  Review of Systems  As in HPI  Objective   Physical Exam  Vitals reviewed.   Constitutional:       General: He is not in acute distress.  Pulmonary:      Effort: Pulmonary effort is normal.       /77 (BP Location: Left arm, Patient Position: Sitting)   Pulse 72   Temp 97.3 °F (36.3 °C) (Skin)   Resp 16   Ht 172.7 cm (68\")   Wt 84.4 kg (186 lb)   SpO2 100%   BMI 28.28 kg/m²     Assessment & Plan    underwent  repeat bilateral lumbar medial branch block at L4/5, L5/S1.  Reported 80% relief 15 minutes after the procedure.  Also had 90% relief after first bilateral lumbar medial branch block.    Schedule for bilateral lumbar RFA at L4/5, 5/S1 without sedation.    RTC 4-6 weeks or as needed for repeat    DATE OF PROCEDURE: 2025    PREOPERATIVE DIAGNOSIS: Lumbar spondylosis without myelopathy    POSTOPERATIVE DIAGNOSIS: same    PROCEDURE PERFORMED: Jomar Lumbar Medial Branch Block at L4/L5, L5/S1.     The patient presents with a history of lumbosacral spondylosis bilaterally at level  [ L4/L5 ] [ L5/S1].   The patient understands the risks and benefits of the procedure and wishes to proceed. The patient was seen in the preoperative area.  Patient's consent was obtained and updated.  Vitals were taken.  Patient was then brought to the procedure suite " and placed in a prone position. The appropriate anatomic area was widely prepped with Chloroprep and draped in a sterile fashion.  Noninvasive monitoring per routine anesthesia protocol was placed.  Under fluoroscopic guidance an AP view with caudad cephaled tilt was obtained. A 22 gauge curved tip spinal needle was passed through skin anesthetized with 1% Lidocaine without epinephrine. The needle tip was guided into the superior medial aspect of the transverse process at  [ L4 ] [ L5 ] [ sacral ala ].  Next 0.25 mL of  preservative free contrast were injected.   At this point 0.5 mL of 0.25% bupivacaine was injected. A sterile dressing was placed over the puncture site. The patient tolerated the procedure with  no complications. They were then brought to the post procedure area where they recovered nicely.    Reported 90% relief 15 to 20 minutes after the procedure.

## 2025-02-13 ENCOUNTER — TELEPHONE (OUTPATIENT)
Dept: PAIN MEDICINE | Facility: HOSPITAL | Age: 75
End: 2025-02-13
Payer: MEDICARE

## 2025-02-13 NOTE — TELEPHONE ENCOUNTER
Post procedure phone call completed.  Pt states they are doing good and denies questions or concerns. Reported pain level of 2. Stated 60% relief from procedure.

## 2025-02-19 DIAGNOSIS — G89.4 CHRONIC PAIN SYNDROME: ICD-10-CM

## 2025-02-19 DIAGNOSIS — M79.2 NEUROPATHIC PAIN: ICD-10-CM

## 2025-02-19 RX ORDER — LUBIPROSTONE 24 UG/1
24 CAPSULE ORAL 2 TIMES DAILY
Qty: 180 CAPSULE | Refills: 2 | Status: SHIPPED | OUTPATIENT
Start: 2025-02-19

## 2025-02-19 RX ORDER — GABAPENTIN 600 MG/1
600 TABLET ORAL 3 TIMES DAILY
Qty: 120 TABLET | Refills: 5 | Status: SHIPPED | OUTPATIENT
Start: 2025-02-19

## 2025-02-24 ENCOUNTER — OFFICE VISIT (OUTPATIENT)
Dept: FAMILY MEDICINE CLINIC | Facility: CLINIC | Age: 75
End: 2025-02-24
Payer: MEDICARE

## 2025-02-24 ENCOUNTER — LAB (OUTPATIENT)
Dept: LAB | Facility: HOSPITAL | Age: 75
End: 2025-02-24
Payer: MEDICARE

## 2025-02-24 VITALS
DIASTOLIC BLOOD PRESSURE: 80 MMHG | TEMPERATURE: 98.8 F | OXYGEN SATURATION: 96 % | RESPIRATION RATE: 16 BRPM | HEART RATE: 61 BPM | WEIGHT: 184 LBS | HEIGHT: 68 IN | BODY MASS INDEX: 27.89 KG/M2 | SYSTOLIC BLOOD PRESSURE: 147 MMHG

## 2025-02-24 DIAGNOSIS — E55.9 VITAMIN D DEFICIENCY: Chronic | ICD-10-CM

## 2025-02-24 DIAGNOSIS — E78.2 MIXED HYPERLIPIDEMIA: Chronic | ICD-10-CM

## 2025-02-24 DIAGNOSIS — S40.811A ARM ABRASION, RIGHT, INITIAL ENCOUNTER: Chronic | ICD-10-CM

## 2025-02-24 DIAGNOSIS — N40.0 BENIGN PROSTATIC HYPERPLASIA WITHOUT LOWER URINARY TRACT SYMPTOMS: Chronic | ICD-10-CM

## 2025-02-24 DIAGNOSIS — E53.8 B12 DEFICIENCY: Chronic | ICD-10-CM

## 2025-02-24 DIAGNOSIS — N18.31 STAGE 3A CHRONIC KIDNEY DISEASE: Chronic | ICD-10-CM

## 2025-02-24 DIAGNOSIS — M79.2 NEUROPATHIC PAIN: Chronic | ICD-10-CM

## 2025-02-24 DIAGNOSIS — M47.817 LUMBOSACRAL SPONDYLOSIS WITHOUT MYELOPATHY: Chronic | ICD-10-CM

## 2025-02-24 DIAGNOSIS — I10 PRIMARY HYPERTENSION: Chronic | ICD-10-CM

## 2025-02-24 DIAGNOSIS — E11.65 TYPE 2 DIABETES MELLITUS WITH HYPERGLYCEMIA, WITHOUT LONG-TERM CURRENT USE OF INSULIN: Chronic | ICD-10-CM

## 2025-02-24 DIAGNOSIS — I65.23 BILATERAL CAROTID ARTERY STENOSIS: Chronic | ICD-10-CM

## 2025-02-24 LAB
25(OH)D3 SERPL-MCNC: 26.3 NG/ML (ref 30–100)
ALBUMIN SERPL-MCNC: 4 G/DL (ref 3.5–5.2)
ALBUMIN UR-MCNC: 6.7 MG/DL
ALBUMIN/GLOB SERPL: 1.4 G/DL
ALP SERPL-CCNC: 65 U/L (ref 39–117)
ALT SERPL W P-5'-P-CCNC: 18 U/L (ref 1–41)
ANION GAP SERPL CALCULATED.3IONS-SCNC: 8.1 MMOL/L (ref 5–15)
AST SERPL-CCNC: 20 U/L (ref 1–40)
BILIRUB SERPL-MCNC: 0.8 MG/DL (ref 0–1.2)
BUN SERPL-MCNC: 8 MG/DL (ref 8–23)
BUN/CREAT SERPL: 7.6 (ref 7–25)
CALCIUM SPEC-SCNC: 9.4 MG/DL (ref 8.6–10.5)
CHLORIDE SERPL-SCNC: 107 MMOL/L (ref 98–107)
CHOLEST SERPL-MCNC: 89 MG/DL (ref 0–200)
CO2 SERPL-SCNC: 25.9 MMOL/L (ref 22–29)
CREAT SERPL-MCNC: 1.05 MG/DL (ref 0.76–1.27)
CREAT UR-MCNC: 175.3 MG/DL
EGFRCR SERPLBLD CKD-EPI 2021: 74 ML/MIN/1.73
GLOBULIN UR ELPH-MCNC: 2.9 GM/DL
GLUCOSE SERPL-MCNC: 97 MG/DL (ref 65–99)
HBA1C MFR BLD: 4.4 % (ref 4.8–5.6)
HDLC SERPL-MCNC: 28 MG/DL (ref 40–60)
LDLC SERPL CALC-MCNC: 37 MG/DL (ref 0–100)
LDLC/HDLC SERPL: 1.22 {RATIO}
MICROALBUMIN/CREAT UR: 38.2 MG/G (ref 0–29)
POTASSIUM SERPL-SCNC: 4 MMOL/L (ref 3.5–5.2)
PROT SERPL-MCNC: 6.9 G/DL (ref 6–8.5)
SODIUM SERPL-SCNC: 141 MMOL/L (ref 136–145)
TRIGL SERPL-MCNC: 134 MG/DL (ref 0–150)
VIT B12 BLD-MCNC: 644 PG/ML (ref 211–946)
VLDLC SERPL-MCNC: 24 MG/DL (ref 5–40)

## 2025-02-24 PROCEDURE — 99214 OFFICE O/P EST MOD 30 MIN: CPT | Performed by: NURSE PRACTITIONER

## 2025-02-24 PROCEDURE — 80053 COMPREHEN METABOLIC PANEL: CPT

## 2025-02-24 PROCEDURE — 83036 HEMOGLOBIN GLYCOSYLATED A1C: CPT

## 2025-02-24 PROCEDURE — 36415 COLL VENOUS BLD VENIPUNCTURE: CPT | Performed by: NURSE PRACTITIONER

## 2025-02-24 PROCEDURE — 82570 ASSAY OF URINE CREATININE: CPT

## 2025-02-24 PROCEDURE — 1160F RVW MEDS BY RX/DR IN RCRD: CPT | Performed by: NURSE PRACTITIONER

## 2025-02-24 PROCEDURE — 3079F DIAST BP 80-89 MM HG: CPT | Performed by: NURSE PRACTITIONER

## 2025-02-24 PROCEDURE — 3077F SYST BP >= 140 MM HG: CPT | Performed by: NURSE PRACTITIONER

## 2025-02-24 PROCEDURE — 90471 IMMUNIZATION ADMIN: CPT | Performed by: NURSE PRACTITIONER

## 2025-02-24 PROCEDURE — 1159F MED LIST DOCD IN RCRD: CPT | Performed by: NURSE PRACTITIONER

## 2025-02-24 PROCEDURE — 82043 UR ALBUMIN QUANTITATIVE: CPT

## 2025-02-24 PROCEDURE — 82607 VITAMIN B-12: CPT | Performed by: NURSE PRACTITIONER

## 2025-02-24 PROCEDURE — 80061 LIPID PANEL: CPT

## 2025-02-24 PROCEDURE — 82306 VITAMIN D 25 HYDROXY: CPT

## 2025-02-24 PROCEDURE — 1125F AMNT PAIN NOTED PAIN PRSNT: CPT | Performed by: NURSE PRACTITIONER

## 2025-02-24 PROCEDURE — 90715 TDAP VACCINE 7 YRS/> IM: CPT | Performed by: NURSE PRACTITIONER

## 2025-02-24 RX ORDER — LANCETS
EACH MISCELLANEOUS
Qty: 102 EACH | Refills: 0 | Status: SHIPPED | OUTPATIENT
Start: 2025-02-24 | End: 2025-03-02
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
## 2025-02-24 NOTE — PATIENT INSTRUCTIONS
TDAP today  Monitor blood pressure and blood sugars  Schedule with urology  Follow up on lab results.   Clean abrasion with soap and water.

## 2025-02-26 ENCOUNTER — HOSPITAL ENCOUNTER (OUTPATIENT)
Dept: PAIN MEDICINE | Facility: HOSPITAL | Age: 75
Discharge: HOME | End: 2025-02-26
Payer: MEDICARE

## 2025-02-26 ENCOUNTER — TELEPHONE (OUTPATIENT)
Dept: FAMILY MEDICINE CLINIC | Facility: CLINIC | Age: 75
End: 2025-02-26
Payer: MEDICARE

## 2025-02-26 VITALS
SYSTOLIC BLOOD PRESSURE: 136 MMHG | TEMPERATURE: 97.7 F | HEIGHT: 68 IN | DIASTOLIC BLOOD PRESSURE: 81 MMHG | HEART RATE: 62 BPM | OXYGEN SATURATION: 95 % | RESPIRATION RATE: 16 BRPM | BODY MASS INDEX: 26.52 KG/M2 | WEIGHT: 175 LBS

## 2025-02-26 DIAGNOSIS — R52 PAIN: ICD-10-CM

## 2025-02-26 DIAGNOSIS — M47.817 LUMBOSACRAL SPONDYLOSIS WITHOUT MYELOPATHY: Primary | ICD-10-CM

## 2025-02-26 PROCEDURE — 25010000002 METHYLPREDNISOLONE PER 40 MG: Performed by: ANESTHESIOLOGY

## 2025-02-26 PROCEDURE — 77003 FLUOROGUIDE FOR SPINE INJECT: CPT

## 2025-02-26 PROCEDURE — 64636 DESTROY L/S FACET JNT ADDL: CPT | Performed by: ANESTHESIOLOGY

## 2025-02-26 PROCEDURE — 25010000002 LIDOCAINE PF 1% 1 % SOLUTION: Performed by: ANESTHESIOLOGY

## 2025-02-26 PROCEDURE — 64635 DESTROY LUMB/SAC FACET JNT: CPT | Performed by: ANESTHESIOLOGY

## 2025-02-26 PROCEDURE — 25010000002 BUPIVACAINE (PF) 0.25 % SOLUTION: Performed by: ANESTHESIOLOGY

## 2025-02-26 RX ORDER — METHYLPREDNISOLONE ACETATE 40 MG/ML
40 INJECTION, SUSPENSION INTRA-ARTICULAR; INTRALESIONAL; INTRAMUSCULAR; SOFT TISSUE ONCE
Status: COMPLETED | OUTPATIENT
Start: 2025-02-26 | End: 2025-02-26

## 2025-02-26 RX ORDER — BUPIVACAINE HYDROCHLORIDE 2.5 MG/ML
10 INJECTION, SOLUTION EPIDURAL; INFILTRATION; INTRACAUDAL ONCE
Status: COMPLETED | OUTPATIENT
Start: 2025-02-26 | End: 2025-02-26

## 2025-02-26 RX ORDER — LIDOCAINE HYDROCHLORIDE 10 MG/ML
5 INJECTION, SOLUTION EPIDURAL; INFILTRATION; INTRACAUDAL; PERINEURAL ONCE
Status: COMPLETED | OUTPATIENT
Start: 2025-02-26 | End: 2025-02-26

## 2025-02-26 RX ORDER — AMLODIPINE BESYLATE 5 MG/1
5 TABLET ORAL 2 TIMES DAILY
Qty: 180 TABLET | Refills: 1 | Status: SHIPPED | OUTPATIENT
Start: 2025-02-26

## 2025-02-26 RX ADMIN — LIDOCAINE HYDROCHLORIDE 5 ML: 10 INJECTION, SOLUTION EPIDURAL; INFILTRATION; INTRACAUDAL; PERINEURAL at 14:06

## 2025-02-26 RX ADMIN — LIDOCAINE HYDROCHLORIDE 5 ML: 10 INJECTION, SOLUTION EPIDURAL; INFILTRATION; INTRACAUDAL; PERINEURAL at 13:59

## 2025-02-26 RX ADMIN — BUPIVACAINE HYDROCHLORIDE 10 ML: 2.5 INJECTION, SOLUTION EPIDURAL; INFILTRATION; INTRACAUDAL; PERINEURAL at 14:11

## 2025-02-26 RX ADMIN — METHYLPREDNISOLONE ACETATE 40 MG: 40 INJECTION, SUSPENSION INTRA-ARTICULAR; INTRALESIONAL; INTRAMUSCULAR; INTRASYNOVIAL; SOFT TISSUE at 14:11

## 2025-02-26 NOTE — DISCHARGE INSTRUCTIONS
Radiofrequency Lesioning, Care After    Refer to this sheet in the next few weeks. These instructions provide you with information about caring for yourself after your procedure. Your health care provider may also give you more specific instructions. Your treatment has been planned according to current medical practices, but problems sometimes occur. Call your health care provider if you have any problems or questions after your procedure.  What can I expect after the procedure?  After the procedure, it is common to have:  Pain from the burned nerve.  You may feel a burning sensation for up to 1-2 weeks after the procedure  Temporary numbness at the site  Your leg/legs may be weak or feel numb after the procedure until the numbing medication wears off.  When you are up and walking, have assistance to prevent falling.     Follow these instructions at home:  Take over-the-counter and prescription medicines only as told by your health care provider.  Return to your normal activities as told by your health care provider. Ask your health care provider what activities are safe for you.  Pay close attention to how you feel after the procedure. If you start to have pain, write down when it hurts and how it feels. This will help you and your health care provider to know if you need an additional treatment.  Check your needle insertion site every day for signs of infection. Watch for:  Redness, swelling, or pain.  Fluid, blood, or pus.  Keep all follow-up visits as told by your health care provider. This is important.  No driving for 24 hrs-make sure you have full sensation in your legs prior to driving.  Avoid using heat on the injection site for 24 hours. You may use ice intermittently if needed by placing a               towel between your skin and the ice bag and using the ice for 20 minutes 2-3 times a day.  Do not take baths, swim or use a hot tub for 24 hours.  Leave your band-aids on for 24 hours and keep them  dry.    Contact a health care provider if:  Your pain does not get better.  You have redness, swelling, or pain at the needle insertion site.  You have fluid, blood, or pus coming from the needle insertion site.  You have a fever over 101 degrees.  You have new numb.ness in your arm or leg after the procedure    Get help right away if:  You develop sudden, severe pain.  You develop numbness or tingling near the procedure site that does not go away.  This information is not intended to replace advice given to you by your health care provider. Make sure you discuss any questions you have with your health care provider.  Document Released: 08/16/2012 Document Revised: 05/25/2017 Document Reviewed: 01/25/2016  [employer] Interactive Patient Education © 2019 [employer]

## 2025-02-26 NOTE — TELEPHONE ENCOUNTER
Caller: Merlin Trujillo    Relationship: Self    Best call back number: [phone]    Requested Prescriptions:   Requested Prescriptions     Pending Prescriptions Disp Refills    amLODIPine (NORVASC) 5 MG tablet 180 tablet 0     Sig: Take 1 tablet by mouth 2 (Two) Times a Day.        Pharmacy where request should be sent: [id number] - [place], [place] - [address] [facility] - [phone] [facility] [phone] FX     Last office visit with prescribing clinician: 2/24/2025   Last telemedicine visit with prescribing clinician: Visit date not found   Next office visit with prescribing clinician: 8/29/2025     Additional details provided by patient: HAS ONE PILL LEFT     Does the patient have less than a 3 day supply:  [x] Yes  [] No    Would you like a call back once the refill request has been completed: [] Yes [x] No    If the office needs to give you a call back, can they leave a voicemail: [] Yes [x] No    Elinor Otto   02/26/25 10:36 EST

## 2025-02-27 ENCOUNTER — TELEPHONE (OUTPATIENT)
Dept: PAIN MEDICINE | Facility: HOSPITAL | Age: 75
End: 2025-02-27
Payer: MEDICARE

## 2025-02-27 NOTE — TELEPHONE ENCOUNTER
Post procedure phone call completed.  Pt states they are doing good and denies questions or concerns. Pain #1

## 2025-02-28 RX ORDER — ATORVASTATIN CALCIUM 40 MG/1
20 TABLET, FILM COATED ORAL
Start: 2025-02-28

## 2025-02-28 NOTE — TELEPHONE ENCOUNTER
Returned the patient's phone call for now because of an A1c of being 4.4 I want to stop his glipizide that he was taking 10 mg twice a day he is going to monitor his blood sugars he has to take them every day monitor for lows nothing higher if if they continue to be over 150 he is to contact the office.  Keep taking his metformin his LDLs were very low also I am going to cut his metformin down also he is actively exercising eating healthy and working on weight loss.  Will recheck these labs in 3 to 6 months next visit if he starts having high blood sugars we may add the medication back he understands this

## 2025-03-02 RX ORDER — LANCETS
EACH MISCELLANEOUS
Qty: 102 EACH | Refills: 0 | Status: SHIPPED | OUTPATIENT
Start: 2025-03-02

## 2025-03-03 NOTE — PROCEDURES
"Subjective    CC back  pain  Merlin Trujillo is a 75 y.o. male lumbar spondylosis here for bilateral lumbar RFA at L4/5, L5/S1.  No anticoagulation    Pain Assessment   Location of Pain: Lower Back, R Hip, L Hip, right leg   description of Pain: Dull/Aching, Throbbing, Stabbing  Previous Pain Rating :4  Current Pain Ratin  Aggravating Factors: Activity  Alleviating Factors: Rest, Medication    The following portions of the patient's history were reviewed and updated as appropriate: allergies, current medications, past family history, past medical history, past social history, past surgical history and problem list.  Review of Systems  As in HPI  Objective   Physical Exam  Vitals reviewed.   Constitutional:       General: He is not in acute distress.  Pulmonary:      Effort: Pulmonary effort is normal.       /81 (BP Location: Left arm, Patient Position: Sitting)   Pulse 62   Temp 97.7 °F (36.5 °C) (Skin)   Resp 16   Ht 172.7 cm (68\")   Wt 79.4 kg (175 lb)   SpO2 95%   BMI 26.61 kg/m²     Assessment & Plan    underwent  bilateral lumbar RFA at L4/5, L5/S1 .    RTC 4-6 weeks or as needed for repeat    DATE OF PROCEDURE:   2025     PREOPERATIVE DIAGNOSIS:   Lumbar spondylosis without myelopathy     POSTOPERATIVE DIAGNOSIS: same     PROCEDURE PERFORMED:  Bilateral lumbar Sacral RFTC at  L4/L5, L5/S1.     The patient presents with a history of lumbosacral spondylosis  at level [  L4/L5 ] [ L5/ S1 ].  The patient presents today for lumbosacral RFTC.  The patient understands the risks and benefits of the procedure and wishes to proceed.  The patient was seen in the preoperative area.  Patient's consent was obtained and updated.  Vitals were taken.  Patient was then brought to the procedure suite and placed in a prone position.  The appropriate anatomic area was widely prepped with Chloraprep and draped in a sterile fashion.  Noninvasive monitoring per routine anesthesia protocol was placed.  Under " fluoroscopic guidance an AP view with caudad cephaled tilt was obtained.  A 20 guage RFTC cannula was passed through skin anesthetized with 1% Lidocaine without epinephrine.  The needle tip was guided to the superior medial aspect of the transverse process at [  L4 ][  L5 and  sacral ala ] bilaterally.   Motor stimulation was undertaken at 2.0 mAmps at 2 Hertz. At no point was motor stimulation or sensory stimulation noted in a radicular fashion.  Impedence range 200's. Prior to ablation, each level was anesthetized with 2mL of 1% Lidocaine without epinephrine. The medial branch nerves were then ablated at 80* C for 90 seconds each .  Following ablation, each level was injected with 1 mL of  expiration containing 1 mL of 40 mg Depo-Medrol and 4 mL of 0.25% bupivacaine and the RFTC cannula removed.  A sterile dressing was placed over the puncture site.     The patient tolerated the procedure with no complications. They were then brought to the post procedure area where they recovered nicely.      Discharge  The patient will be discharged home in stable condition.  Patient understands to contact the Center with any post procedure questions or concerns.  Discharge instructions given by nursing staff.

## 2025-03-10 DIAGNOSIS — G89.4 CHRONIC PAIN SYNDROME: ICD-10-CM

## 2025-03-10 RX ORDER — MORPHINE SULFATE 15 MG/1
15 TABLET, FILM COATED, EXTENDED RELEASE ORAL 3 TIMES DAILY PRN
Qty: 90 TABLET | Refills: 0 | Status: SHIPPED | OUTPATIENT
Start: 2025-03-12

## 2025-03-18 ENCOUNTER — OFFICE VISIT (OUTPATIENT)
Dept: PAIN MEDICINE | Facility: CLINIC | Age: 75
End: 2025-03-18
Payer: MEDICARE

## 2025-03-18 VITALS
WEIGHT: 181 LBS | DIASTOLIC BLOOD PRESSURE: 89 MMHG | RESPIRATION RATE: 16 BRPM | OXYGEN SATURATION: 95 % | SYSTOLIC BLOOD PRESSURE: 150 MMHG | HEART RATE: 60 BPM | BODY MASS INDEX: 27.52 KG/M2

## 2025-03-18 DIAGNOSIS — Z79.899 HIGH RISK MEDICATION USE: ICD-10-CM

## 2025-03-18 DIAGNOSIS — M79.2 NEUROPATHIC PAIN: ICD-10-CM

## 2025-03-18 DIAGNOSIS — G89.4 CHRONIC PAIN SYNDROME: ICD-10-CM

## 2025-03-18 DIAGNOSIS — M47.817 LUMBOSACRAL SPONDYLOSIS WITHOUT MYELOPATHY: Primary | ICD-10-CM

## 2025-03-19 RX ORDER — MORPHINE SULFATE 15 MG/1
15 TABLET, FILM COATED, EXTENDED RELEASE ORAL 3 TIMES DAILY PRN
Qty: 90 TABLET | Refills: 0 | Status: SHIPPED | OUTPATIENT
Start: 2025-05-10

## 2025-03-19 RX ORDER — MORPHINE SULFATE 15 MG/1
15 TABLET, FILM COATED, EXTENDED RELEASE ORAL 3 TIMES DAILY PRN
Qty: 90 TABLET | Refills: 0 | Status: SHIPPED | OUTPATIENT
Start: 2025-04-11
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
## 2025-04-02 NOTE — TELEPHONE ENCOUNTER
"    Caller: Merlin Trujillo    Relationship: Self    Best call back number:   Telephone Information:   Mobile [phone]         Requested Prescriptions:   Requested Prescriptions     Pending Prescriptions Disp Refills    atenolol (TENORMIN) 50 MG tablet 90 tablet 0     Sig: Take 0.5 tablets by mouth Daily.    Syringe/Needle, Disp, (B-D 3CC LUER-RONY SYR 25GX1\") 25G X 1\" 3 ML misc 12 each 1     Sig: USE AS DIRECTED WITH 12 INJECTION        Pharmacy where request should be sent: [facility] #[id number] - [place], [place] - [address] [facility] [address] - [phone]  - [phone] FX     Last office visit with prescribing clinician: 2/24/2025   Last telemedicine visit with prescribing clinician: Visit date not found   Next office visit with prescribing clinician: 8/29/2025     Additional details provided by patient:     Does the patient have less than a 3 day supply:  [x] Yes  [] No    Would you like a call back once the refill request has been completed: [] Yes [] No    If the office needs to give you a call back, can they leave a voicemail: [] Yes [] No    Niya Nova   04/02/25 14:23 EDT           "

## 2025-04-03 RX ORDER — ATENOLOL 50 MG/1
25 TABLET ORAL DAILY
Qty: 90 TABLET | Refills: 0 | Status: SHIPPED | OUTPATIENT
Start: 2025-04-03

## 2025-04-03 RX ORDER — SYRINGE WITH NEEDLE, 1 ML 25GX5/8"
SYRINGE, EMPTY DISPOSABLE MISCELLANEOUS
Qty: 12 EACH | Refills: 1 | Status: SHIPPED | OUTPATIENT
Start: 2025-04-03

## 2025-04-24 DIAGNOSIS — M79.2 NEUROPATHIC PAIN: ICD-10-CM

## 2025-04-24 DIAGNOSIS — G89.4 CHRONIC PAIN SYNDROME: ICD-10-CM

## 2025-04-24 RX ORDER — BACLOFEN 10 MG/1
10 TABLET ORAL 3 TIMES DAILY
Qty: 270 TABLET | Refills: 0 | OUTPATIENT
Start: 2025-04-24

## 2025-04-24 RX ORDER — GABAPENTIN 600 MG/1
600 TABLET ORAL 4 TIMES DAILY
Qty: 120 TABLET | Refills: 5 | Status: SHIPPED | OUTPATIENT
Start: 2025-04-24

## 2025-04-24 NOTE — TELEPHONE ENCOUNTER
Caller: Merlin Trujillo    Relationship: Self    Best call back number:     Requested Prescriptions:   Requested Prescriptions     Pending Prescriptions Disp Refills    gabapentin (NEURONTIN) 600 MG tablet 120 tablet 5     Sig: Take 1 tablet by mouth 4 (Four) Times a Day.        Pharmacy where request should be sent: [facility] #[id number] - [place], [place] - [address] [facility] [facility] [address] [facility] [address] [facility] [address] - [phone] [facility] [phone] FX     Last office visit with prescribing clinician: 3/18/2025   Last telemedicine visit with prescribing clinician: Visit date not found   Next office visit with prescribing clinician: 6/10/2025     Additional details provided by patient: PATIENT STATED THE GABAPENTIN WAS SUPPOSED TO BE FILLED FOR 4 TIMES A DAY RATHER THAN 3 TIMES A DAY LIKE THE PREVIOUS PRESCRIPTION PATIENT STATES HE CURRENTLY HAS LESS THAN A THREE DAY SUPPLY.    Does the patient have less than a 3 day supply:  [x] Yes  [] No    Would you like a call back once the refill request has been completed: [x] Yes [] No    If the office needs to give you a call back, can they leave a voicemail: [x] Yes [] No    Niya Mujica Rep   04/24/25 14:14 EDT

## 2025-05-21 ENCOUNTER — OFFICE VISIT (OUTPATIENT)
Dept: CARDIOLOGY | Facility: CLINIC | Age: 75
End: 2025-05-21
Payer: MEDICARE

## 2025-05-21 VITALS
SYSTOLIC BLOOD PRESSURE: 132 MMHG | HEIGHT: 68 IN | OXYGEN SATURATION: 96 % | WEIGHT: 182.2 LBS | HEART RATE: 65 BPM | DIASTOLIC BLOOD PRESSURE: 76 MMHG | BODY MASS INDEX: 27.61 KG/M2

## 2025-05-21 DIAGNOSIS — I10 PRIMARY HYPERTENSION: Primary | ICD-10-CM

## 2025-05-21 DIAGNOSIS — I25.10 CHRONIC CORONARY ARTERY DISEASE: ICD-10-CM

## 2025-05-21 DIAGNOSIS — I65.23 BILATERAL CAROTID ARTERY STENOSIS: ICD-10-CM

## 2025-05-21 DIAGNOSIS — E78.2 MIXED HYPERLIPIDEMIA: ICD-10-CM

## 2025-05-21 PROCEDURE — 99214 OFFICE O/P EST MOD 30 MIN: CPT | Performed by: INTERNAL MEDICINE

## 2025-05-21 PROCEDURE — 1160F RVW MEDS BY RX/DR IN RCRD: CPT | Performed by: INTERNAL MEDICINE

## 2025-05-21 PROCEDURE — 3078F DIAST BP <80 MM HG: CPT | Performed by: INTERNAL MEDICINE

## 2025-05-21 PROCEDURE — 93000 ELECTROCARDIOGRAM COMPLETE: CPT | Performed by: INTERNAL MEDICINE

## 2025-05-21 PROCEDURE — 3075F SYST BP GE 130 - 139MM HG: CPT | Performed by: INTERNAL MEDICINE

## 2025-05-21 PROCEDURE — 1159F MED LIST DOCD IN RCRD: CPT | Performed by: INTERNAL MEDICINE

## 2025-05-21 NOTE — PROGRESS NOTES
Cardiology Office Visit      Encounter Date:  05/21/2025    Patient ID:   Merlin Trujillo is a 75 y.o. male.    Reason For Followup:  Peripheral artery disease  Carotid stenosis  Hypertension  Hyperlipidemia    Brief Clinical History:  Dear Dr. Peng, Reggie Duane, MD    I had the pleasure of seeing Merlin Trujillo today. As you are well aware, this is a 75 y.o. male with past medical history that is significant for history of hypertension hyperlipidemia  and peripheral arterial disease here for follow-up  Patient had symptoms of TIA with amaurosis fugax in the right eye.  Noted to have significant carotid stenosis in the right carotid artery  Status post a successful right carotid endarterectomy  Patient had recent hospitalization with urosepsis  Echocardiogram showed normal LV systolic function        Interval History:  Denies any chest pain  New cardiac symptoms  Denies any dizziness or syncope  Good functional status  Interpretation Summary    Myocardial perfusion imaging indicates a normal myocardial perfusion study with no evidence of ischemia.  Left ventricular ejection fraction is hyperdynamic (Calculated EF > 70%). .  Findings consistent with a normal ECG stress test.  Impressions are consistent with a low risk study.  There is no prior study available for comparison.    1. The patient has a history of prior right carotid surgery a few years ago. The degree of narrowing is less than 50% by consensus panel criteria in the right common carotid artery carotid bifurcation.  2. Mild to moderate plaque deposition left carotid bifurcation. The degree of narrowing is less than 50% by consensus panel criteria.  3. Patent vertebral arteries bilaterally with antegrade flow.       Assessment & Plan    Impressions:     Hypertension   hyperlipidemia   peripheral arterial disease   presumed coronary artery disease but no evidence of any inducible ischemia on the stress test    Normal postoperative appearance of the  "right internal carotid artery post carotid endarterectomy, with no recurrent plaque or stenosis.    Normal left carotid duplex scan.   Stress test in May 2021 with normal LV systolic function normal wall motion estimated LV ejection fraction of 70% with no inducible ischemia  Chronic renal insufficiency with improvement in the creatinine numbers compared to baseline  [place] hypertension    Recommendations:  Recent myocardial perfusion study that was normal   continued aggressive risk factor modification   regular exercise   labs with primary care physician office  Labs discussed with the patient  Recent labs reviewed and discussed with the patient  Medications reviewed and discussed with patient  Labs and medications reviewed and discussed the patient  Current medications include aspirin 81 mg p.o. once a day patient is on metformin 5 mg p.o. [place] patient is on amlodipine 5 mg p.o. twice daily patient is carvedilol 25 mg p.o. once a day Lipitor 20 mg p.o. at nighttime  Close monitoring of blood pressure at home  Recent carotid ultrasound study from December 2023 with normal postoperative findings involving the right side and no significant stenosis involving the left internal carotid artery  Consider repeat carotid ultrasound study next year  Home blood pressure monitoring  Recent labs reviewed and discussed with patient  Close monitoring of renal function with primary care physician office  Follow up in office in 6 months        Vitals:  Vitals:    05/21/25 0848   BP: 132/76   BP Location: Left arm   Patient Position: Sitting   Cuff Size: Adult   Pulse: 65   SpO2: 96%   Weight: 82.6 kg (182 lb 3.2 oz)   Height: 172.7 cm (68\")       Physical Exam:    General: Alert, cooperative, no distress, appears stated age  Head:  Normocephalic, atraumatic, mucous membranes moist  Eyes:  Conjunctiva/corneas clear, EOM's intact     Neck:  Supple,  no adenopathy;      Lungs: Clear to auscultation bilaterally, no wheezes rhonchi " rales are noted  Chest wall: No tenderness  Heart::  Regular rate and rhythm, S1 and S2 normal, no murmur, rub or gallop  Abdomen: Soft, non-tender, nondistended bowel sounds active  Extremities: No cyanosis, clubbing, or edema  Pulses: 2+ and symmetric all extremities  Skin:  No rashes or lesions  Neuro/psych: A&O x3. CN II through XII are grossly intact with appropriate affect              Lab Results   Component Value Date    GLUCOSE 97 02/24/2025    BUN 8 02/24/2025    CREATININE 1.05 02/24/2025    EGFR 74.0 02/24/2025    BCR 7.6 02/24/2025    K 4.0 02/24/2025    CO2 25.9 02/24/2025    CALCIUM 9.4 02/24/2025    ALBUMIN 4.0 02/24/2025    BILITOT 0.8 02/24/2025    AST 20 02/24/2025    ALT 18 02/24/2025        No results found for this or any previous visit.     Lab Results   Component Value Date    CHOL 89 02/24/2025    CHLPL 147 03/03/2022    TRIG 134 02/24/2025    HDL 28 (L) 02/24/2025    LDL 37 02/24/2025      Results for orders placed during the hospital encounter of 05/19/21    Stress Test With Myocardial Perfusion One Day    Interpretation Summary  · Myocardial perfusion imaging indicates a normal myocardial perfusion study with no evidence of ischemia.  · Left ventricular ejection fraction is hyperdynamic (Calculated EF > 70%). .  · Findings consistent with a normal ECG stress test.  · Impressions are consistent with a low risk study.  · There is no prior study available for comparison.   Results for orders placed in visit on 04/10/17    CARDIOVASCULAR STUDIES - CONVERTED    Narrative  Nuclear Stress Test Preliminary      Imported By: Daphne Bansal MA 4/10/2017 4:47:10 PM    _____________________________________________________________________    External Attachment:    Type: Image  Comment:  External Document      Electronically signed by Yury Iraheta MD on 04/11/2017 at 8:58 AM  ________________________________________________________________________      Disclaimer: Converted Note message may not  "contain all data elements that existed in the legacy source system. Please see [facility] for the original note details.           Objective:          Allergies:  No Known Allergies    Medication Review:     Current Outpatient Medications:     Accu-Chek FastClix Lancets misc, TEST BLOOD SUGARS ONCE A DAY, Disp: 102 each, Rfl: 0    amLODIPine (NORVASC) 5 MG tablet, Take 1 tablet by mouth 2 (Two) Times a Day., Disp: 180 tablet, Rfl: 1    aspirin 81 MG EC tablet, Take 1 tablet by mouth Daily., Disp: , Rfl:     atenolol (TENORMIN) 50 MG tablet, Take 0.5 tablets by mouth Daily., Disp: 90 tablet, Rfl: 0    atorvastatin (LIPITOR) 40 MG tablet, Take 0.5 tablets by mouth every night at bedtime., Disp: , Rfl:     cholecalciferol (VITAMIN D3) 10 MCG (400 UNIT) tablet, Take 1 tablet by mouth Daily. 1000 units, Disp: , Rfl:     cyanocobalamin 1000 MCG/ML injection, INJECT 1ML INTO THE APPROPRIATE MUSCLE AS DIRECTED BY PRESCRIBER ONE TIME FOR ONE DOSE, Disp: 1 mL, Rfl: 0    gabapentin (NEURONTIN) 600 MG tablet, Take 1 tablet by mouth 4 (Four) Times a Day., Disp: 120 tablet, Rfl: 5    glucose blood (Accu-Chek Guide) test strip, Test blood sugars once a day., Disp: 100 each, Rfl: 3    lubiprostone (AMITIZA) 24 MCG capsule, Take 1 capsule by mouth 2 (Two) Times a Day., Disp: 180 capsule, Rfl: 2    metFORMIN (GLUCOPHAGE) 500 MG tablet, TAKE 1 TABLET BY MOUTH TWICE DAILY WITH MEALS, Disp: 180 tablet, Rfl: 0    Morphine (MS CONTIN) 15 MG 12 hr tablet, Take 1 tablet by mouth 3 (Three) Times a Day As Needed for Severe Pain., Disp: 90 tablet, Rfl: 0    multivitamin (THERAGRAN) tablet tablet, Take  by mouth Daily. Calcium+magnesium+zinc \"Sometimes\", Disp: , Rfl:     pentoxifylline (TRENtal) 400 MG CR tablet, TAKE ONE TABLET BY MOUTH THREE TIMES DAILY, Disp: 270 tablet, Rfl: 0    Syringe/Needle, Disp, (B-D 3CC LUER-RONY SYR 25GX1\") 25G X 1\" 3 ML misc, USE AS DIRECTED WITH B-12 INJECTION, Disp: 12 each, Rfl: 1    Morphine " (MS CONTIN) 15 MG 12 hr tablet, Take 1 tablet by mouth 3 (Three) Times a Day As Needed for Severe Pain. DNF until 5/10/25, Disp: 90 tablet, Rfl: 0    Family History:  Family History   Problem Relation Age of Onset    Dementia Mother     Heart disease Mother     COPD Father     Stroke Father     Heart disease Father     Hypertension Sister     Diabetes Sister     Hypertension Brother        Past Medical History:  Past Medical History:   Diagnosis Date    Adenomatous polyps     Amaurosis fugax 3/27/2017    Arm pain     left into hand    Arm pain     Arm pain     left--- wk comp --injury 6/15/2015    Blood in urine     3/2020    BPH (benign prostatic hyperplasia)     CAD (coronary artery disease)     CKD (chronic kidney disease) stage 3, GFR 30-59 ml/min     CVA, old, speech/language deficit     Diabetes     Diabetic acetonemia     Hearing loss     Hematuria     Hernia, inguinal     Hyperlipidemia     Hypertension     Kidney stones     Low back pain     Neuropathic pain     Obstructive uropathy     Shoulder pain     Shoulder pain     Sleep disorder     Spondylosis, cervical     Urgency of urination        Past surgical History:  Past Surgical History:   Procedure Laterality Date    ANKLE SURGERY      replacement  rt    CAROTID ARTERY ANGIOPLASTY      CARPAL TUNNEL RELEASE      COLONOSCOPY      CYSTOSCOPY URETEROSCOPY LASER LITHOTRIPSY      kidney stones    EYE SURGERY      mesh  and plate under rt eye due to orbital fx    NOSE SURGERY      x2    OTHER SURGICAL HISTORY      uro  lift  1 /2021    TEETH EXTRACTION      dentures       Social History:  Social History     Socioeconomic History    Marital status: Single   Tobacco Use    Smoking status: Never     Passive exposure: Past    Smokeless tobacco: Never   Vaping Use    Vaping status: Never Used   Substance and Sexual Activity    Alcohol use: No    Drug use: Never    Sexual activity: Defer       Review of Systems:  The following systems were reviewed as they relate to  the cardiovascular system: Constitutional, Eyes, ENT, Cardiovascular, Respiratory, Gastrointestinal, Integumentary, Neurological, Psychiatric, Hematologic, Endocrine, Musculoskeletal, and Genitourinary. The pertinent cardiovascular findings are reported above with all other cardiovascular points within those systems being negative.    Diagnostic Study Review:     Current Electrocardiogram:    ECG 12 Lead    Date/Time: 5/21/2025 9:20 AM  Performed by: Yury Iraheta MD    Authorized by: Yury Iraheta MD  Comparison: compared with previous ECG   Similar to previous ECG  Rhythm: sinus rhythm  Rate: normal  BPM: 65  Conduction: conduction normal  ST Segments: ST segments normal  T Waves: T waves normal  QRS axis: normal    Clinical impression: normal ECG                NOTE: The following portions of the patient's history were reviewed and updated this visit as appropriate: allergies, current medications, past family history, past medical history, past social history, past surgical history and problem list.

## 2025-06-05 ENCOUNTER — OFFICE VISIT (OUTPATIENT)
Dept: FAMILY MEDICINE CLINIC | Facility: CLINIC | Age: 75
End: 2025-06-05
Payer: MEDICARE

## 2025-06-05 VITALS
TEMPERATURE: 98.6 F | WEIGHT: 180 LBS | BODY MASS INDEX: 27.28 KG/M2 | RESPIRATION RATE: 16 BRPM | SYSTOLIC BLOOD PRESSURE: 121 MMHG | DIASTOLIC BLOOD PRESSURE: 77 MMHG | OXYGEN SATURATION: 98 % | HEIGHT: 68 IN | HEART RATE: 62 BPM

## 2025-06-05 DIAGNOSIS — E11.65 TYPE 2 DIABETES MELLITUS WITH HYPERGLYCEMIA, WITHOUT LONG-TERM CURRENT USE OF INSULIN: Primary | ICD-10-CM

## 2025-06-05 NOTE — PROGRESS NOTES
Subjective        Merlin Trujillo is a 75 y.o. male.     Chief Complaint   Patient presents with    Hyperglycemia     Per pt   today 230   started when stopped taking Glipizide       Hyperglycemia    Patient is here for management of his diabetes is having changes since he stopped glipizide. His A1C was 4.4 low in 2/24/2205  We stopped his glipizide.  He was having symptoms of weakness and fatigue.  That is the reason the glipizide was stopped.  Now blood sugars running 170-180 now up to 230.   He does not eat strict diabetic diet. He is eating some cookies , some candy not often.     Diabetes : taking metformin 500 mg bid     CKD stage 3 he was seen today said they are running labs not sure what.               The following portions of the patient's history were reviewed and updated as appropriate: allergies, current medications, past family history, past medical history, past social history, past surgical history and problem list.      Current Outpatient Medications:     Accu-Chek FastClix Lancets misc, TEST BLOOD SUGARS ONCE A DAY, Disp: 102 each, Rfl: 0    amLODIPine (NORVASC) 5 MG tablet, Take 1 tablet by mouth 2 (Two) Times a Day., Disp: 180 tablet, Rfl: 1    aspirin 81 MG EC tablet, Take 1 tablet by mouth Daily., Disp: , Rfl:     atenolol (TENORMIN) 50 MG tablet, Take 0.5 tablets by mouth Daily., Disp: 90 tablet, Rfl: 0    atorvastatin (LIPITOR) 40 MG tablet, Take 0.5 tablets by mouth every night at bedtime., Disp: , Rfl:     cholecalciferol (VITAMIN D3) 10 MCG (400 UNIT) tablet, Take 1 tablet by mouth Daily. 1000 units, Disp: , Rfl:     cyanocobalamin 1000 MCG/ML injection, INJECT 1ML INTO THE APPROPRIATE MUSCLE AS DIRECTED BY PRESCRIBER ONE TIME FOR ONE DOSE, Disp: 1 mL, Rfl: 0    gabapentin (NEURONTIN) 600 MG tablet, Take 1 tablet by mouth 4 (Four) Times a Day., Disp: 120 tablet, Rfl: 5    glucose blood (Accu-Chek Guide) test strip, Test blood sugars once a day., Disp: 100 each, Rfl: 3    lubiprostone  "(AMITIZA) 24 MCG capsule, Take 1 capsule by mouth 2 (Two) Times a Day., Disp: 180 capsule, Rfl: 2    metFORMIN (GLUCOPHAGE) 500 MG tablet, Take 2 in am and one at supper, Disp: 270 tablet, Rfl: 0    Morphine (MS CONTIN) 15 MG 12 hr tablet, Take 1 tablet by mouth 3 (Three) Times a Day As Needed for Severe Pain., Disp: 90 tablet, Rfl: 0    multivitamin (THERAGRAN) tablet tablet, Take  by mouth Daily. Calcium+magnesium+zinc \"Sometimes\", Disp: , Rfl:     Syringe/Needle, Disp, (B-D 3CC LUER-RONY SYR 25GX1\") 25G X 1\" 3 ML misc, USE AS DIRECTED WITH B-12 INJECTION, Disp: 12 each, Rfl: 1    Morphine (MS CONTIN) 15 MG 12 hr tablet, Take 1 tablet by mouth 3 (Three) Times a Day As Needed for Severe Pain. DNF until 5/10/25 (Patient not taking: Reported on 6/5/2025), Disp: 90 tablet, Rfl: 0    pentoxifylline (TRENtal) 400 MG CR tablet, TAKE ONE TABLET BY MOUTH THREE TIMES DAILY (Patient not taking: Reported on 6/5/2025), Disp: 270 tablet, Rfl: 0    Recent Results (from the past 24 weeks)   Vitamin B12    Collection Time: 02/24/25  1:00 PM    Specimen: Blood   Result Value Ref Range    Vitamin B-12 644 211 - 946 pg/mL   Lipid Panel    Collection Time: 02/24/25  1:00 PM    Specimen: Blood   Result Value Ref Range    Total Cholesterol 89 0 - 200 mg/dL    Triglycerides 134 0 - 150 mg/dL    HDL Cholesterol 28 (L) 40 - 60 mg/dL    LDL Cholesterol  37 0 - 100 mg/dL    VLDL Cholesterol 24 5 - 40 mg/dL    LDL/HDL Ratio 1.22    Comprehensive Metabolic Panel    Collection Time: 02/24/25  1:00 PM    Specimen: Blood   Result Value Ref Range    Glucose 97 65 - 99 mg/dL    BUN 8 8 - 23 mg/dL    Creatinine 1.05 0.76 - 1.27 mg/dL    Sodium 141 136 - 145 mmol/L    Potassium 4.0 3.5 - 5.2 mmol/L    Chloride 107 98 - 107 mmol/L    CO2 25.9 22.0 - 29.0 mmol/L    Calcium 9.4 8.6 - 10.5 mg/dL    Total Protein 6.9 6.0 - 8.5 g/dL    Albumin 4.0 3.5 - 5.2 g/dL    ALT (SGPT) 18 1 - 41 U/L    AST (SGOT) 20 1 - 40 U/L    Alkaline Phosphatase 65 39 - 117 U/L " "   Total Bilirubin 0.8 0.0 - 1.2 mg/dL    Globulin 2.9 gm/dL    A/G Ratio 1.4 g/dL    BUN/Creatinine Ratio 7.6 7.0 - 25.0    Anion Gap 8.1 5.0 - 15.0 mmol/L    eGFR 74.0 >60.0 mL/min/1.73   Microalbumin / Creatinine Urine Ratio - Urine, Clean Catch    Collection Time: 02/24/25  1:00 PM    Specimen: Urine, Clean Catch   Result Value Ref Range    Microalbumin/Creatinine Ratio 38.2 (H) 0.0 - 29.0 mg/g    Creatinine, Urine 175.3 mg/dL    Microalbumin, Urine 6.7 mg/dL   Hemoglobin A1c    Collection Time: 02/24/25  1:00 PM    Specimen: Blood   Result Value Ref Range    Hemoglobin A1C 4.40 (L) 4.80 - 5.60 %   Vitamin D,25-Hydroxy    Collection Time: 02/24/25  1:00 PM    Specimen: Blood   Result Value Ref Range    25 Hydroxy, Vitamin D 26.3 (L) 30.0 - 100.0 ng/ml         Review of Systems    Objective     /77   Pulse 62   Temp 98.6 °F (37 °C) (Oral)   Resp 16   Ht 172.7 cm (67.99\")   Wt 81.6 kg (180 lb)   SpO2 98%   BMI 27.38 kg/m²     Physical Exam  Vitals and nursing note reviewed.   Constitutional:       Appearance: Normal appearance.   HENT:      Head: Normocephalic.      Right Ear: Tympanic membrane normal.      Left Ear: Tympanic membrane and ear canal normal.      Nose: Nose normal.      Mouth/Throat:      Mouth: Mucous membranes are moist.   Eyes:      Pupils: Pupils are equal, round, and reactive to light.   Cardiovascular:      Rate and Rhythm: Normal rate and regular rhythm.      Pulses: Normal pulses.      Heart sounds: Normal heart sounds.   Pulmonary:      Effort: Pulmonary effort is normal.      Breath sounds: Normal breath sounds.   Abdominal:      General: Bowel sounds are normal.      Palpations: Abdomen is soft.   Musculoskeletal:      Cervical back: Neck supple.   Skin:     General: Skin is warm.   Neurological:      General: No focal deficit present.      Mental Status: He is alert and oriented to person, place, and time.   Psychiatric:         Mood and Affect: Mood normal.         " Behavior: Behavior normal.         Thought Content: Thought content normal.         Result Review :                Assessment & Plan    Diagnoses and all orders for this visit:    1. Type 2 diabetes mellitus with hyperglycemia, without long-term current use of insulin (Primary)  Comments:  change metformin to 500 mg 2 in am and one at supper.  Orders:  -     Hemoglobin A1c; Future    Other orders  -     metFORMIN (GLUCOPHAGE) 500 MG tablet; Take 2 in am and one at supper  Dispense: 270 tablet; Refill: 0      Patient Instructions   Start metformin 500 mg take 2 in am and one at supper. Blood sugar too low if 80 or under.   Follow up on lab results.     Follow Up   Return in about 2 weeks (around 6/19/2025).    Patient was given instructions and counseling regarding his condition or for health maintenance advice. Please see specific information pulled into the AVS if appropriate.     Court Anderson, APRN    06/05/25

## 2025-06-05 NOTE — PATIENT INSTRUCTIONS
Start metformin 500 mg take 2 in am and one at supper. Blood sugar too low if 80 or under.   Follow up on lab results.

## 2025-06-10 ENCOUNTER — OFFICE VISIT (OUTPATIENT)
Dept: PAIN MEDICINE | Facility: CLINIC | Age: 75
End: 2025-06-10
Payer: MEDICARE

## 2025-06-10 ENCOUNTER — LAB (OUTPATIENT)
Dept: LAB | Facility: HOSPITAL | Age: 75
End: 2025-06-10
Payer: MEDICARE

## 2025-06-10 VITALS
RESPIRATION RATE: 16 BRPM | SYSTOLIC BLOOD PRESSURE: 141 MMHG | DIASTOLIC BLOOD PRESSURE: 65 MMHG | HEART RATE: 58 BPM | WEIGHT: 180 LBS | OXYGEN SATURATION: 96 % | BODY MASS INDEX: 27.38 KG/M2

## 2025-06-10 DIAGNOSIS — M47.817 LUMBOSACRAL SPONDYLOSIS WITHOUT MYELOPATHY: Primary | ICD-10-CM

## 2025-06-10 DIAGNOSIS — M79.2 NEUROPATHIC PAIN: ICD-10-CM

## 2025-06-10 DIAGNOSIS — E11.65 TYPE 2 DIABETES MELLITUS WITH HYPERGLYCEMIA, WITHOUT LONG-TERM CURRENT USE OF INSULIN: ICD-10-CM

## 2025-06-10 DIAGNOSIS — M47.812 CERVICAL SPONDYLOSIS WITHOUT MYELOPATHY: ICD-10-CM

## 2025-06-10 DIAGNOSIS — M54.16 LUMBAR RADICULITIS: ICD-10-CM

## 2025-06-10 LAB — HBA1C MFR BLD: 7.87 % (ref 4.8–5.6)

## 2025-06-10 PROCEDURE — 36415 COLL VENOUS BLD VENIPUNCTURE: CPT

## 2025-06-10 PROCEDURE — 83036 HEMOGLOBIN GLYCOSYLATED A1C: CPT

## 2025-06-10 RX ORDER — MORPHINE SULFATE 15 MG/1
15 TABLET, FILM COATED, EXTENDED RELEASE ORAL 3 TIMES DAILY PRN
Qty: 90 TABLET | Refills: 0 | Status: SHIPPED | OUTPATIENT
Start: 2025-06-20

## 2025-06-10 RX ORDER — MORPHINE SULFATE 15 MG/1
15 TABLET, FILM COATED, EXTENDED RELEASE ORAL 3 TIMES DAILY PRN
Qty: 90 TABLET | Refills: 0 | Status: SHIPPED | OUTPATIENT
Start: 2025-07-19
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
## 2025-06-17 ENCOUNTER — OFFICE VISIT (OUTPATIENT)
Dept: FAMILY MEDICINE CLINIC | Facility: CLINIC | Age: 75
End: 2025-06-17
Payer: MEDICARE

## 2025-06-17 VITALS
WEIGHT: 178 LBS | SYSTOLIC BLOOD PRESSURE: 134 MMHG | RESPIRATION RATE: 17 BRPM | DIASTOLIC BLOOD PRESSURE: 84 MMHG | BODY MASS INDEX: 26.98 KG/M2 | HEIGHT: 68 IN | OXYGEN SATURATION: 95 % | TEMPERATURE: 98.7 F | HEART RATE: 59 BPM

## 2025-06-17 DIAGNOSIS — E11.65 TYPE 2 DIABETES MELLITUS WITH HYPERGLYCEMIA, WITHOUT LONG-TERM CURRENT USE OF INSULIN: Primary | ICD-10-CM

## 2025-06-17 RX ORDER — LANCETS
EACH MISCELLANEOUS
Qty: 102 EACH | Refills: 0 | Status: SHIPPED | OUTPATIENT
Start: 2025-06-17 | End: 2025-06-20

## 2025-06-17 NOTE — PATIENT INSTRUCTIONS
You should never eat sugar.   Drink water  Even fruits   Have monitor carbohydrate intake.   You will hear from endocrinology about appointment

## 2025-06-17 NOTE — PROGRESS NOTES
Subjective        Merlin Trujillo is a 75 y.o. male.     Chief Complaint   Patient presents with    Hyperglycemia     2 weeks fl/u       History of Present Illness  Patient is here for management of his DM.  He was seen in the office several weeks ago.  I adjusted his metformin dosage.  Blood sugar 275 this a.m. said hardly ate. Drank coffee with sugar.   Recent A1C 7.87.  Was up from previous he was 4.4.   taking metformin 2 in am and one pm.   We had stopped glimperide because he was doing well and having lows. He is on chronic steroid injections. For back pain.   Will start januvia 25 mg daily.   Eating a lot less cookies.   He is trying to eat more fruits.   He is drinking water more.   Continue to check blood sugar and if you want see what sugar does check one hr post eating it.           The following portions of the patient's history were reviewed and updated as appropriate: allergies, current medications, past family history, past medical history, past social history, past surgical history and problem list.      Current Outpatient Medications:     Accu-Chek FastClix Lancets misc, TEST BLOOD SUGARS ONCE A DAY, Disp: 102 each, Rfl: 0    amLODIPine (NORVASC) 5 MG tablet, Take 1 tablet by mouth 2 (Two) Times a Day., Disp: 180 tablet, Rfl: 1    aspirin 81 MG EC tablet, Take 1 tablet by mouth Daily., Disp: , Rfl:     atenolol (TENORMIN) 50 MG tablet, Take 0.5 tablets by mouth Daily., Disp: 90 tablet, Rfl: 0    atorvastatin (LIPITOR) 40 MG tablet, Take 0.5 tablets by mouth every night at bedtime., Disp: , Rfl:     cholecalciferol (VITAMIN D3) 10 MCG (400 UNIT) tablet, Take 1 tablet by mouth Daily. 1000 units, Disp: , Rfl:     cyanocobalamin 1000 MCG/ML injection, INJECT 1ML INTO THE APPROPRIATE MUSCLE AS DIRECTED BY PRESCRIBER ONE TIME FOR ONE DOSE, Disp: 1 mL, Rfl: 0    gabapentin (NEURONTIN) 600 MG tablet, Take 1 tablet by mouth 4 (Four) Times a Day., Disp: 120 tablet, Rfl: 5    glucose blood (Accu-Chek Guide)  "test strip, Test blood sugars once a day., Disp: 100 each, Rfl: 3    lubiprostone (AMITIZA) 24 MCG capsule, Take 1 capsule by mouth 2 (Two) Times a Day., Disp: 180 capsule, Rfl: 2    metFORMIN (GLUCOPHAGE) 500 MG tablet, Take 2 in am and one at supper, Disp: 270 tablet, Rfl: 0    [START ON 6/20/2025] Morphine (MS CONTIN) 15 MG 12 hr tablet, Take 1 tablet by mouth 3 (Three) Times a Day As Needed for Severe Pain., Disp: 90 tablet, Rfl: 0    [START ON 7/19/2025] Morphine (MS CONTIN) 15 MG 12 hr tablet, Take 1 tablet by mouth 3 (Three) Times a Day As Needed for Severe Pain. DNF until 7/19/25, Disp: 90 tablet, Rfl: 0    multivitamin (THERAGRAN) tablet tablet, Take  by mouth Daily. Calcium+magnesium+zinc \"Sometimes\", Disp: , Rfl:     pentoxifylline (TRENtal) 400 MG CR tablet, TAKE ONE TABLET BY MOUTH THREE TIMES DAILY, Disp: 270 tablet, Rfl: 0    Syringe/Needle, Disp, (B-D 3CC LUER-RONY SYR 25GX1\") 25G X 1\" 3 ML misc, USE AS DIRECTED WITH B-12 INJECTION, Disp: 12 each, Rfl: 1    SITagliptin (Januvia) 25 MG tablet, Take 1 tablet by mouth Daily., Disp: 30 tablet, Rfl: 0    Recent Results (from the past 24 weeks)   Vitamin B12    Collection Time: 02/24/25  1:00 PM    Specimen: Blood   Result Value Ref Range    Vitamin B-12 644 211 - 946 pg/mL   Lipid Panel    Collection Time: 02/24/25  1:00 PM    Specimen: Blood   Result Value Ref Range    Total Cholesterol 89 0 - 200 mg/dL    Triglycerides 134 0 - 150 mg/dL    HDL Cholesterol 28 (L) 40 - 60 mg/dL    LDL Cholesterol  37 0 - 100 mg/dL    VLDL Cholesterol 24 5 - 40 mg/dL    LDL/HDL Ratio 1.22    Comprehensive Metabolic Panel    Collection Time: 02/24/25  1:00 PM    Specimen: Blood   Result Value Ref Range    Glucose 97 65 - 99 mg/dL    BUN 8 8 - 23 mg/dL    Creatinine 1.05 0.76 - 1.27 mg/dL    Sodium 141 136 - 145 mmol/L    Potassium 4.0 3.5 - 5.2 mmol/L    Chloride 107 98 - 107 mmol/L    CO2 25.9 22.0 - 29.0 mmol/L    Calcium 9.4 8.6 - 10.5 mg/dL    Total Protein 6.9 6.0 - 8.5 " "g/dL    Albumin 4.0 3.5 - 5.2 g/dL    ALT (SGPT) 18 1 - 41 U/L    AST (SGOT) 20 1 - 40 U/L    Alkaline Phosphatase 65 39 - 117 U/L    Total Bilirubin 0.8 0.0 - 1.2 mg/dL    Globulin 2.9 gm/dL    A/G Ratio 1.4 g/dL    BUN/Creatinine Ratio 7.6 7.0 - 25.0    Anion Gap 8.1 5.0 - 15.0 mmol/L    eGFR 74.0 >60.0 mL/min/1.73   Microalbumin / Creatinine Urine Ratio - Urine, Clean Catch    Collection Time: 02/24/25  1:00 PM    Specimen: Urine, Clean Catch   Result Value Ref Range    Microalbumin/Creatinine Ratio 38.2 (H) 0.0 - 29.0 mg/g    Creatinine, Urine 175.3 mg/dL    Microalbumin, Urine 6.7 mg/dL   Hemoglobin A1c    Collection Time: 02/24/25  1:00 PM    Specimen: Blood   Result Value Ref Range    Hemoglobin A1C 4.40 (L) 4.80 - 5.60 %   Vitamin D,25-Hydroxy    Collection Time: 02/24/25  1:00 PM    Specimen: Blood   Result Value Ref Range    25 Hydroxy, Vitamin D 26.3 (L) 30.0 - 100.0 ng/ml   Hemoglobin A1c    Collection Time: 06/10/25  2:50 PM    Specimen: Blood   Result Value Ref Range    Hemoglobin A1C 7.87 (H) 4.80 - 5.60 %         Review of Systems    Objective     /84 (BP Location: Left arm, Patient Position: Sitting, Cuff Size: Adult)   Pulse 59   Temp 98.7 °F (37.1 °C) (Oral)   Resp 17   Ht 172.7 cm (67.99\")   Wt 80.7 kg (178 lb)   SpO2 95%   BMI 27.07 kg/m²     Physical Exam  Vitals and nursing note reviewed.   Constitutional:       General: He is not in acute distress.     Appearance: Normal appearance. He is not ill-appearing.   Cardiovascular:      Rate and Rhythm: Normal rate.      Heart sounds: Normal heart sounds.   Pulmonary:      Breath sounds: Normal breath sounds.   Neurological:      General: No focal deficit present.      Mental Status: He is alert.   Psychiatric:         Mood and Affect: Mood normal.         Thought Content: Thought content normal.         Result Review :                Assessment & Plan    Diagnoses and all orders for this visit:    1. Type 2 diabetes mellitus with " hyperglycemia, without long-term current use of insulin (Primary)  Comments:  continue metformin start januvia.  Orders:  -     Ambulatory Referral to Endocrinology    Other orders  -     SITagliptin (Januvia) 25 MG tablet; Take 1 tablet by mouth Daily.  Dispense: 30 tablet; Refill: 0  -     glucose blood (Accu-Chek Guide) test strip; Test blood sugars once a day.  Dispense: 100 each; Refill: 3  -     Accu-Chek FastClix Lancets misc; TEST BLOOD SUGARS ONCE A DAY  Dispense: 102 each; Refill: 0      Patient Instructions   You should never eat sugar.   Drink water  Even fruits   Have monitor carbohydrate intake.   You will hear from endocrinology about appointment      Follow Up   Return in about 3 months (around 9/17/2025).    Patient was given instructions and counseling regarding his condition or for health maintenance advice. Please see specific information pulled into the AVS if appropriate.     Court Anderson, APRN    06/17/25

## 2025-06-18 NOTE — TELEPHONE ENCOUNTER
Caller: Merlin Trujillo    Relationship: Self    Best call back number: [phone]    Requested Prescriptions:   Requested Prescriptions     Pending Prescriptions Disp Refills    atorvastatin (LIPITOR) 40 MG tablet       Sig: Take 0.5 tablets by mouth every night at bedtime.    pentoxifylline (TRENtal) 400 MG CR tablet 270 tablet 0     Sig: Take 1 tablet by mouth 3 (Three) Times a Day.      Pharmacy where request should be sent: [id number] [place] [place] [facility] [address] AT [facility] [address] [facility] [address] - [phone]  - [phone] FX     Last office visit with prescribing clinician: 6/17/2025   Last telemedicine visit with prescribing clinician: Visit date not found   Next office visit with prescribing clinician: 8/29/2025     Does the patient have less than a 3 day supply:  [x] Yes  [] No    Niya Aguayo   06/18/25 14:04 EDT

## 2025-06-19 ENCOUNTER — HOSPITAL ENCOUNTER (OUTPATIENT)
Dept: PAIN MEDICINE | Facility: HOSPITAL | Age: 75
Discharge: HOME | End: 2025-06-19
Payer: MEDICARE

## 2025-06-19 VITALS
DIASTOLIC BLOOD PRESSURE: 70 MMHG | SYSTOLIC BLOOD PRESSURE: 110 MMHG | HEART RATE: 60 BPM | OXYGEN SATURATION: 96 % | TEMPERATURE: 96.9 F | RESPIRATION RATE: 16 BRPM

## 2025-06-19 DIAGNOSIS — R52 PAIN: ICD-10-CM

## 2025-06-19 DIAGNOSIS — M54.16 LUMBAR RADICULITIS: Primary | ICD-10-CM

## 2025-06-19 PROCEDURE — 25510000001 IOPAMIDOL 41 % SOLUTION: Performed by: ANESTHESIOLOGY

## 2025-06-19 PROCEDURE — 77003 FLUOROGUIDE FOR SPINE INJECT: CPT

## 2025-06-19 PROCEDURE — 25010000002 METHYLPREDNISOLONE PER 40 MG: Performed by: ANESTHESIOLOGY

## 2025-06-19 RX ORDER — IOPAMIDOL 408 MG/ML
3 INJECTION, SOLUTION INTRATHECAL
Status: COMPLETED | OUTPATIENT
Start: 2025-06-19 | End: 2025-06-19

## 2025-06-19 RX ORDER — PENTOXIFYLLINE 400 MG/1
400 TABLET, EXTENDED RELEASE ORAL 3 TIMES DAILY
Qty: 270 TABLET | Refills: 0 | Status: SHIPPED | OUTPATIENT
Start: 2025-06-19

## 2025-06-19 RX ORDER — ATORVASTATIN CALCIUM 40 MG/1
20 TABLET, FILM COATED ORAL
Start: 2025-06-19

## 2025-06-19 RX ORDER — METHYLPREDNISOLONE ACETATE 40 MG/ML
40 INJECTION, SUSPENSION INTRA-ARTICULAR; INTRALESIONAL; INTRAMUSCULAR; SOFT TISSUE ONCE
Status: COMPLETED | OUTPATIENT
Start: 2025-06-19 | End: 2025-06-19

## 2025-06-19 RX ADMIN — IOPAMIDOL 3 ML: 408 INJECTION, SOLUTION INTRATHECAL at 13:22

## 2025-06-19 RX ADMIN — METHYLPREDNISOLONE ACETATE 40 MG: 40 INJECTION, SUSPENSION INTRA-ARTICULAR; INTRALESIONAL; INTRAMUSCULAR; INTRASYNOVIAL; SOFT TISSUE at 13:22

## 2025-06-19 NOTE — DISCHARGE INSTRUCTIONS

## 2025-06-19 NOTE — PROCEDURES
Subjective    CC back  pain  Merlin Trujillo is a 75 y.o. male lumbar stenosis, lumbar radiculitis here for repeat LESI.  No anticoagulation    Pain Assessment   Location of Pain: Lower Back, R Hip, L Hip, right leg   description of Pain: Dull/Aching, Throbbing, Stabbing  Previous Pain Rating :4  Current Pain Ratin  Aggravating Factors: Activity  Alleviating Factors: Rest, Medication    The following portions of the patient's history were reviewed and updated as appropriate: allergies, current medications, past family history, past medical history, past social history, past surgical history and problem list.  Review of Systems  As in HPI  Objective   Physical Exam  Vitals reviewed.   Constitutional:       General: He is not in acute distress.  Pulmonary:      Effort: Pulmonary effort is normal.       /70 (BP Location: Left arm, Patient Position: Sitting)   Pulse 60   Temp 96.9 °F (36.1 °C)   Resp 16   SpO2 96%     Assessment & Plan    underwent repeat LESI.    RTC 4-6 weeks or as needed for repeat    DATE OF PROCEDURE: 2025    PREOPERATIVE DIAGNOSIS: lumbar DDD/radiculitis    POSTOPERATIVE DIAGNOSIS: same    PROCEDURE PERFORMED:  lumbar Epidural Steroid Injection    The patient presents with a history of   lumbar degenerative disc disease with  radiculitis. The patient presents today for a  lumbar epidural steroid injection at level  L5/S1.   The patient was seen in the preoperative area.  Patient's consent was obtained and updated.  Vitals were taken.  Patient was then brought to the procedure suite and placed in a prone position. The appropriate anatomic area was widely prepped with Chloraprep and draped in a sterile fashion. Noninvasive monitoring per routine anesthesia protocol was placed.  Under fluoroscopic guidance using  AP view a 20 gauge styleted tuohy needle was passed through skin anesthetized with 1% Lidocaine without epinephrine.  The needle was advanced using the continuous loss of  resistance to saline technique into the L5 epidural space. Needle tip placement in the epidural space was confirmed by loss of resistance and injection of 1 mL of  preservative free contrast.  Following this 8 mL of a solution containing  1 mL of 40 mg Depo-Medrol and 7 mL of preservative-free saline was carefully administered in the epidural space.  A sterile dressing was placed over the puncture site.    The patient tolerated the procedure with no complications . They were then brought to the post procedure area where they recovered nicely.

## 2025-06-20 ENCOUNTER — TELEPHONE (OUTPATIENT)
Dept: PAIN MEDICINE | Facility: HOSPITAL | Age: 75
End: 2025-06-20
Payer: MEDICARE

## 2025-06-20 RX ORDER — LANCETS
EACH MISCELLANEOUS
Qty: 100 EACH | Refills: 0 | Status: SHIPPED | OUTPATIENT
Start: 2025-06-20

## 2025-06-20 NOTE — TELEPHONE ENCOUNTER
Post procedure phone call completed.  Pt states they are doing good and denies questions or concerns. Pt reports pain level #3 today.

## 2025-07-15 NOTE — TELEPHONE ENCOUNTER
Caller: Merlin Trujillo    Relationship: Self    Best call back number: [phone]    Requested Prescriptions:   Requested Prescriptions     Pending Prescriptions Disp Refills    SITagliptin (Januvia) 25 MG tablet 30 tablet 0     Sig: Take 1 tablet by mouth Daily.        Pharmacy where request should be sent: [id number] - [place], [place] - [address] [facility] [place] [address] [facility] [address] - [phone]  - [phone] FX     Last office visit with prescribing clinician: 6/17/2025   Last telemedicine visit with prescribing clinician: Visit date not found   Next office visit with prescribing clinician: 8/29/2025     Does the patient have less than a 3 day supply:  [x] Yes  [] No    Niya Aguayo Rep   07/15/25 10:40 EDT

## 2025-07-17 RX ORDER — SITAGLIPTIN 25 MG/1
25 TABLET, FILM COATED ORAL DAILY
Qty: 30 TABLET | OUTPATIENT
Start: 2025-07-17

## 2025-08-14 RX ORDER — SYRINGE WITH NEEDLE, 1 ML 25GX5/8"
SYRINGE, EMPTY DISPOSABLE MISCELLANEOUS
Qty: 12 EACH | Refills: 1 | Status: SHIPPED | OUTPATIENT
Start: 2025-08-14

## 2025-08-14 RX ORDER — CYANOCOBALAMIN 1000 UG/ML
1000 INJECTION, SOLUTION INTRAMUSCULAR; SUBCUTANEOUS
Qty: 6 ML | Refills: 1 | Status: SHIPPED | OUTPATIENT
Start: 2025-08-14

## 2025-08-14 RX ORDER — ATORVASTATIN CALCIUM 40 MG/1
20 TABLET, FILM COATED ORAL
Start: 2025-08-14

## 2025-08-18 ENCOUNTER — OFFICE VISIT (OUTPATIENT)
Dept: PAIN MEDICINE | Facility: CLINIC | Age: 75
End: 2025-08-18
Payer: MEDICARE

## 2025-08-18 VITALS
RESPIRATION RATE: 16 BRPM | SYSTOLIC BLOOD PRESSURE: 129 MMHG | OXYGEN SATURATION: 98 % | DIASTOLIC BLOOD PRESSURE: 70 MMHG | HEART RATE: 60 BPM | BODY MASS INDEX: 26.46 KG/M2 | WEIGHT: 174 LBS

## 2025-08-18 DIAGNOSIS — M79.2 NEUROPATHIC PAIN: ICD-10-CM

## 2025-08-18 DIAGNOSIS — M47.812 CERVICAL SPONDYLOSIS WITHOUT MYELOPATHY: ICD-10-CM

## 2025-08-18 DIAGNOSIS — M25.512 CHRONIC LEFT SHOULDER PAIN: ICD-10-CM

## 2025-08-18 DIAGNOSIS — G89.29 CHRONIC LEFT SHOULDER PAIN: ICD-10-CM

## 2025-08-18 DIAGNOSIS — M47.817 LUMBOSACRAL SPONDYLOSIS WITHOUT MYELOPATHY: Primary | ICD-10-CM

## 2025-08-18 DIAGNOSIS — Z79.899 HIGH RISK MEDICATION USE: ICD-10-CM

## 2025-08-18 RX ORDER — MORPHINE SULFATE 15 MG/1
15 TABLET, FILM COATED, EXTENDED RELEASE ORAL 3 TIMES DAILY PRN
Qty: 90 TABLET | Refills: 0 | Status: SHIPPED | OUTPATIENT
Start: 2025-08-18

## 2025-08-18 RX ORDER — MORPHINE SULFATE 15 MG/1
15 TABLET, FILM COATED, EXTENDED RELEASE ORAL 3 TIMES DAILY PRN
Qty: 90 TABLET | Refills: 0 | Status: SHIPPED | OUTPATIENT
Start: 2025-09-17

## 2025-08-22 RX ORDER — AMLODIPINE BESYLATE 5 MG/1
5 TABLET ORAL 2 TIMES DAILY
Qty: 180 TABLET | Refills: 0 | Status: SHIPPED | OUTPATIENT
Start: 2025-08-22

## 2025-08-29 ENCOUNTER — OFFICE VISIT (OUTPATIENT)
Dept: FAMILY MEDICINE CLINIC | Facility: CLINIC | Age: 75
End: 2025-08-29
Payer: MEDICARE

## 2025-08-29 VITALS
BODY MASS INDEX: 26.61 KG/M2 | HEIGHT: 68 IN | WEIGHT: 175.6 LBS | OXYGEN SATURATION: 96 % | RESPIRATION RATE: 17 BRPM | HEART RATE: 62 BPM | TEMPERATURE: 97.6 F | DIASTOLIC BLOOD PRESSURE: 72 MMHG | SYSTOLIC BLOOD PRESSURE: 115 MMHG

## 2025-08-29 DIAGNOSIS — G89.4 CHRONIC PAIN SYNDROME: Chronic | ICD-10-CM

## 2025-08-29 DIAGNOSIS — Z00.00 MEDICARE ANNUAL WELLNESS VISIT, SUBSEQUENT: Primary | ICD-10-CM

## 2025-08-29 DIAGNOSIS — E55.9 VITAMIN D DEFICIENCY: Chronic | ICD-10-CM

## 2025-08-29 DIAGNOSIS — E78.2 MIXED HYPERLIPIDEMIA: Chronic | ICD-10-CM

## 2025-08-29 DIAGNOSIS — E53.8 B12 DEFICIENCY: Chronic | ICD-10-CM

## 2025-08-29 DIAGNOSIS — E11.65 TYPE 2 DIABETES MELLITUS WITH HYPERGLYCEMIA, WITHOUT LONG-TERM CURRENT USE OF INSULIN: Chronic | ICD-10-CM

## 2025-08-29 DIAGNOSIS — M79.2 NEUROPATHIC PAIN: Chronic | ICD-10-CM

## 2025-08-29 DIAGNOSIS — I25.10 CHRONIC CORONARY ARTERY DISEASE: Chronic | ICD-10-CM

## 2025-08-29 DIAGNOSIS — I10 PRIMARY HYPERTENSION: Chronic | ICD-10-CM

## 2025-08-29 RX ORDER — ATORVASTATIN CALCIUM 40 MG/1
20 TABLET, FILM COATED ORAL
Qty: 90 TABLET | Refills: 1 | Status: SHIPPED | OUTPATIENT
Start: 2025-08-29 | End: 2025-08-29 | Stop reason: ALTCHOICE

## 2025-08-29 RX ORDER — ATORVASTATIN CALCIUM 20 MG/1
20 TABLET, FILM COATED ORAL DAILY
Qty: 90 TABLET | Refills: 1 | Status: SHIPPED | OUTPATIENT
Start: 2025-08-29